# Patient Record
Sex: FEMALE | Race: WHITE | NOT HISPANIC OR LATINO | Employment: OTHER | URBAN - METROPOLITAN AREA
[De-identification: names, ages, dates, MRNs, and addresses within clinical notes are randomized per-mention and may not be internally consistent; named-entity substitution may affect disease eponyms.]

---

## 2017-01-03 ENCOUNTER — OFFICE VISIT (OUTPATIENT)
Dept: INTERNAL MEDICINE | Facility: CLINIC | Age: 72
End: 2017-01-03
Payer: MEDICARE

## 2017-01-03 ENCOUNTER — HOSPITAL ENCOUNTER (OUTPATIENT)
Dept: RADIOLOGY | Facility: HOSPITAL | Age: 72
Discharge: HOME | End: 2017-01-03
Attending: INTERNAL MEDICINE
Payer: MEDICARE

## 2017-01-03 ENCOUNTER — PATIENT MESSAGE (OUTPATIENT)
Dept: INTERNAL MEDICINE | Facility: CLINIC | Age: 72
End: 2017-01-03

## 2017-01-03 VITALS
TEMPERATURE: 99 F | HEIGHT: 64 IN | OXYGEN SATURATION: 94 % | HEART RATE: 77 BPM | WEIGHT: 173.94 LBS | DIASTOLIC BLOOD PRESSURE: 80 MMHG | SYSTOLIC BLOOD PRESSURE: 140 MMHG | BODY MASS INDEX: 29.7 KG/M2

## 2017-01-03 DIAGNOSIS — R05.9 COUGH: ICD-10-CM

## 2017-01-03 DIAGNOSIS — R05.9 COUGH: Primary | ICD-10-CM

## 2017-01-03 PROCEDURE — 99213 OFFICE O/P EST LOW 20 MIN: CPT | Mod: S$PBB,,, | Performed by: INTERNAL MEDICINE

## 2017-01-03 PROCEDURE — 71020 XR CHEST PA AND LATERAL: CPT | Mod: TC

## 2017-01-03 PROCEDURE — 99999 PR PBB SHADOW E&M-EST. PATIENT-LVL III: CPT | Mod: PBBFAC,,, | Performed by: INTERNAL MEDICINE

## 2017-01-03 PROCEDURE — 71020 XR CHEST PA AND LATERAL: CPT | Mod: 26,,, | Performed by: RADIOLOGY

## 2017-01-03 RX ORDER — AZITHROMYCIN 250 MG/1
TABLET, FILM COATED ORAL
Qty: 6 TABLET | Refills: 0 | Status: SHIPPED | OUTPATIENT
Start: 2017-01-03 | End: 2017-02-14 | Stop reason: ALTCHOICE

## 2017-01-03 NOTE — PROGRESS NOTES
Subjective:       Patient ID: Loni Heard is a 71 y.o. female.    Chief Complaint: Nasal Congestion and Cough    HPI the patient is a 71-year-old female is had a cough for 2 months.  She has periodically coughed up green sputum and yellowish sputum.  She has taken a course of doxycycline about 4-5 weeks ago.  This did little to help improve her symptoms long-term.  Her symptoms intensified about a week ago.  And she is now noticing a sore sore throat and low-grade fever.  She is not having any shortness of breath.  A steroid shot was given to her about a week ago which provided some temporary benefit.  Review of Systems   Constitutional: Negative.  Negative for activity change, appetite change and fatigue.   HENT: Positive for congestion, postnasal drip and sore throat.    Respiratory: Positive for cough. Negative for chest tightness and shortness of breath.    Cardiovascular: Negative for chest pain and palpitations.       Objective:      Physical Exam   Constitutional: She appears well-developed and well-nourished. No distress.   HENT:   Right Ear: External ear normal.   Left Ear: External ear normal.   Mouth/Throat: No oropharyngeal exudate.   Reddened posterior pharynx   Neck: No JVD present. No tracheal deviation present. No thyromegaly present.   Cardiovascular: Normal rate, regular rhythm and normal heart sounds.  Exam reveals no gallop and no friction rub.    No murmur heard.  Pulmonary/Chest: Effort normal and breath sounds normal. No respiratory distress. She has no wheezes. She has no rales.   Frequent cough throughout the exam   Lymphadenopathy:     She has no cervical adenopathy.   Skin: She is not diaphoretic.       Assessment:       1. Cough      her cough appears to be multifactorial related to an upper respiratory infection as well as possible ALLERGIES.  I'll get a chest x-ray to be sure no pulmonary cause is present.  Plan:       1.  Z-Yoandy  2.  Flonase 2 sprays each nostril daily given  her occasional exposure to secondhand smoke  3.  Chest x-ray  4.  The patient has appointment with her primary care physician tomorrow

## 2017-01-03 NOTE — MR AVS SNAPSHOT
[facility] - Internal Medicine  [address]  [place] [address]  Phone: [phone]  Fax: [phone]                  Loni Heard   1/3/2017 11:20 AM   Office Visit    Description:  Female : 1945   Provider:  Gavino Rosenberg Jr., MD   Department:  [facility] - Internal Medicine           Reason for Visit     Nasal Congestion     Cough           Diagnoses this Visit        Comments    Cough    -  Primary            To Do List           Future Appointments        Provider Department Dept Phone    1/3/2017 1:45 PM [facility] [id number] LIMIT [facility] [phone]    2017 8:30 AM LAB, APPOINTMENT [facility] IN[facility] [phone]    2017 9:30 AM Mariaelena Mallory MD [facility] Internal Medicine [phone]      Goals (5 Years of Data)     None       These Medications        Disp Refills Start End    azithromycin (ZITHROMAX Z-CARLINE) 250 MG tablet 6 tablet 0 1/3/2017     2 by mouth on day 1, one by mouth daily thereafter    Pharmacy: [facility] [address] [place] [address] Ph #: [phone]         [facility]     [facility] Nurse Care Line -  Assistance  Registered nurses in the [facility] Center provide clinical advisement, health education, appointment booking, and other advisory services.  Call for this free service at [phone].             Medications           Message regarding Medications     Verify the changes and/or additions to your medication regime listed below are the same as discussed with your clinician today.  If any of these changes or additions are incorrect, please notify your healthcare provider.        START taking these NEW medications        Refills    azithromycin (ZITHROMAX Z-CARLINE) 250 MG tablet 0    Si by mouth on day 1, one by mouth daily thereafter    Class: Normal      STOP taking these medications     azelastine-fluticasone  "(DYMISTA) 137-50 mcg/spray Spry 1 spray by Nasal route 2 (two) times daily as needed.    doxycycline (VIBRA-TABS) 100 MG tablet TK 1 T PO BID FOR 10 DAYS           Verify that the below list of medications is an accurate representation of the medications you are currently taking.  If none reported, the list may be blank. If incorrect, please contact your healthcare provider. Carry this list with you in case of emergency.           Current Medications     metformin (GLUCOPHAGE-XR) 500 MG 24 hr tablet Take 1 tablet (500 mg total) by mouth 2 (two) times daily with meals.    PROAIR HFA 90 mcg/actuation inhaler Inhale once every 4 hours prn for 30 days    azithromycin (ZITHROMAX Z-CARLINE) 250 MG tablet 2 by mouth on day 1, one by mouth daily thereafter    benzonatate (TESSALON) 100 MG capsule            Clinical Reference Information           Vital Signs - Last Recorded  Most recent update: 1/3/2017 11:28 AM by Zenaida Goldman MA    BP Pulse Temp Ht Wt SpO2    (!) 140/80 (BP Location: Left arm, Patient Position: Sitting) 77 98.7 °F (37.1 °C) 5' 4" (1.626 m) 78.9 kg (173 lb 15.1 oz) (!) 94%    BMI                29.86 kg/m2          Blood Pressure          Most Recent Value    BP  (!)  140/80      Allergies as of 1/3/2017     Codeine    Penicillins      Immunizations Administered on Date of Encounter - 1/3/2017     None      Orders Placed During Today's Visit     Future Labs/Procedures Expected by Expires    X-Ray Chest PA And Lateral  1/3/2017 4/3/2017      "

## 2017-01-04 ENCOUNTER — OFFICE VISIT (OUTPATIENT)
Dept: INTERNAL MEDICINE | Facility: CLINIC | Age: 72
End: 2017-01-04
Payer: MEDICARE

## 2017-01-04 VITALS
BODY MASS INDEX: 30.26 KG/M2 | DIASTOLIC BLOOD PRESSURE: 74 MMHG | HEIGHT: 64 IN | SYSTOLIC BLOOD PRESSURE: 134 MMHG | HEART RATE: 72 BPM | WEIGHT: 177.25 LBS

## 2017-01-04 DIAGNOSIS — R05.3 CHRONIC COUGH: ICD-10-CM

## 2017-01-04 DIAGNOSIS — E11.9 TYPE 2 DIABETES MELLITUS WITHOUT COMPLICATION, WITHOUT LONG-TERM CURRENT USE OF INSULIN: ICD-10-CM

## 2017-01-04 DIAGNOSIS — J30.2 SEASONAL ALLERGIC RHINITIS, UNSPECIFIED ALLERGIC RHINITIS TRIGGER: Primary | ICD-10-CM

## 2017-01-04 DIAGNOSIS — B99.9 RECURRENT INFECTIONS: ICD-10-CM

## 2017-01-04 PROCEDURE — 99213 OFFICE O/P EST LOW 20 MIN: CPT | Mod: PBBFAC | Performed by: INTERNAL MEDICINE

## 2017-01-04 PROCEDURE — 99214 OFFICE O/P EST MOD 30 MIN: CPT | Mod: S$PBB,,, | Performed by: INTERNAL MEDICINE

## 2017-01-04 PROCEDURE — 99999 PR PBB SHADOW E&M-EST. PATIENT-LVL III: CPT | Mod: PBBFAC,,, | Performed by: INTERNAL MEDICINE

## 2017-01-04 RX ORDER — METFORMIN HYDROCHLORIDE 500 MG/1
500 TABLET ORAL
COMMUNITY
End: 2017-01-04 | Stop reason: ALTCHOICE

## 2017-01-04 RX ORDER — KETOCONAZOLE 20 MG/ML
SHAMPOO, SUSPENSION TOPICAL
COMMUNITY
Start: 2015-05-20 | End: 2017-01-30

## 2017-01-04 NOTE — MR AVS SNAPSHOT
[facility] - Internal Medicine  [address]  [facility] [address]  Phone: [phone]  Fax: [phone]                  Loni Heard   2017 11:30 AM   Office Visit    Description:  Female : 1945   Provider:  Mariaelena Mallory MD   Department:  [facility] - Internal Medicine           Reason for Visit     Follow-up           Diagnoses this Visit        Comments    Seasonal allergic rhinitis, unspecified allergic rhinitis trigger    -  Primary     Recurrent infections         Type 2 diabetes mellitus without complication, without long-term current use of insulin         Chronic cough                To Do List           Future Appointments        Provider Department Dept Phone    2017 8:30 AM LAB, APPOINTMENT [facility] [phone]    2017 9:00 AM Mariaelena Mallory MD [facility] Internal Medicine [phone]      Goals (5 Years of Data)     None       These Medications        Disp Refills Start End    SITagliptan (JANUVIA) 50 MG Tab 30 tablet 4 2017     Take 1 tablet (50 mg total) by mouth once daily. - Oral    Pharmacy: [facility] [id number] [place] [address] Ph #: [phone]         [facility]     [facility] Nurse Care Line -  Assistance  Registered nurses in the [facility] provide clinical advisement, health education, appointment booking, and other advisory services.  Call for this free service at [phone].             Medications           Message regarding Medications     Verify the changes and/or additions to your medication regime listed below are the same as discussed with your clinician today.  If any of these changes or additions are incorrect, please notify your healthcare provider.        START taking these NEW medications        Refills    SITagliptan (JANUVIA) 50 MG Tab 4    Sig: Take 1 tablet (50 mg total) by mouth once daily.    Class:  "Normal    Route: Oral      STOP taking these medications     metformin (GLUCOPHAGE) 500 MG tablet Take 500 mg by mouth.    metformin (GLUCOPHAGE-XR) 500 MG 24 hr tablet Take 1 tablet (500 mg total) by mouth 2 (two) times daily with meals.           Verify that the below list of medications is an accurate representation of the medications you are currently taking.  If none reported, the list may be blank. If incorrect, please contact your healthcare provider. Carry this list with you in case of emergency.           Current Medications     azithromycin (ZITHROMAX Z-CARLINE) 250 MG tablet 2 by mouth on day 1, one by mouth daily thereafter    benzonatate (TESSALON) 100 MG capsule     ketoconazole (NIZORAL) 2 % shampoo Apply to scalp/hair, let sit 5-10 minutes, then rinse 3x/wk    PROAIR HFA 90 mcg/actuation inhaler Inhale once every 4 hours prn for 30 days    SITagliptan (JANUVIA) 50 MG Tab Take 1 tablet (50 mg total) by mouth once daily.           Clinical Reference Information           Vital Signs - Last Recorded  Most recent update: 1/4/2017 12:11 PM by Breana Pickens MA    BP Pulse Ht Wt BMI    134/74 72 5' 4" (1.626 m) 80.4 kg (177 lb 4 oz) 30.42 kg/m2      Blood Pressure          Most Recent Value    BP  134/74      Allergies as of 1/4/2017     Codeine    Penicillins      Immunizations Administered on Date of Encounter - 1/4/2017     None      Orders Placed During Today's Visit      Normal Orders This Visit    Ambulatory consult to Allergy       "

## 2017-01-05 ENCOUNTER — TELEPHONE (OUTPATIENT)
Dept: INTERNAL MEDICINE | Facility: CLINIC | Age: 72
End: 2017-01-05

## 2017-01-05 DIAGNOSIS — E11.9 TYPE 2 DIABETES MELLITUS WITHOUT COMPLICATION, WITHOUT LONG-TERM CURRENT USE OF INSULIN: Primary | ICD-10-CM

## 2017-01-05 RX ORDER — LANCETS
EACH MISCELLANEOUS
Qty: 100 EACH | Refills: 3 | Status: SHIPPED | OUTPATIENT
Start: 2017-01-05 | End: 2019-09-17 | Stop reason: SDUPTHER

## 2017-01-05 RX ORDER — DEXTROSE 4 G
TABLET,CHEWABLE ORAL
Qty: 1 EACH | Refills: 0 | Status: SHIPPED | OUTPATIENT
Start: 2017-01-05

## 2017-01-05 NOTE — TELEPHONE ENCOUNTER
----- Message from Julien Fine MA sent at 1/4/2017  4:35 PM CST -----  Contact: Nay jovan/Gimtmdgsp-[phone]  Type: Rx    Name of medication(s): Meter, Test strips and Lancets    Is this a refill? New rx? New    Who prescribed medication?    [facility], Phone, & [facility] [address] [place] [address]    Comments: Please advise. Thanks!

## 2017-01-05 NOTE — PROGRESS NOTES
"Subjective:       Patient ID: Loni Heard is a 71 y.o. female.    Chief Complaint: Follow-up  71 year old presnet with concerns. She has had problems with cough and recurrant upper respiratory infections. She had prior history of allergies and asthma as a child and not sure what is contributint to her symptoms. She started this fall and saw urgent care doctor was given antibiotic later a steroid shot. She was dr. jacobs yesterday and started z pack with some improvement  She denies current fever or chills. She has inhaler and gets wheezing on occasion. She is concerned about why she is getting recurrant uri and would like to have her allergies evaluated  She also reports no compliance with her metformin. She has diarrhea when she takes it she was taking one but now has not been taking at all and her bs have been up . She would now be aggreable to alternative agents. She has concerns about metals and chemicals she reports but no specific exposures. She has had some grief with the death of her mother  This year but denies significan depression . She has been worn down.    HPI  Review of Systems   Constitutional: Positive for fatigue.   HENT: Positive for sinus pressure.    Respiratory: Positive for cough.    Gastrointestinal: Negative for abdominal pain.   Psychiatric/Behavioral:        Grief from loss of mother        Objective:     Blood pressure 134/74, pulse 72, height 5' 4" (1.626 m), weight 80.4 kg (177 lb 4 oz).    Physical Exam   Constitutional: No distress.   Occasional cough on exam    HENT:   Head: Normocephalic.   Mouth/Throat: Oropharynx is clear and moist.   Nasal congestion    Eyes: No scleral icterus.   Neck: Neck supple.   Cardiovascular: Normal rate, regular rhythm and normal heart sounds.    Pulmonary/Chest: Effort normal and breath sounds normal.   Abdominal: Soft. Bowel sounds are normal.   Neurological: She is alert.   Vitals reviewed.      Assessment:       1. Seasonal allergic " rhinitis, unspecified allergic rhinitis trigger    2. Recurrent infections    3. Type 2 diabetes mellitus without complication, without long-term current use of insulin    4. Chronic cough        Plan:       Loni was seen today for follow-up.    Diagnoses and all orders for this visit:    Seasonal allergic rhinitis, unspecified allergic rhinitis trigger  With hx childhood asthma   Recurrent infections  -     Ambulatory consult to Allergy    Type 2 diabetes mellitus without complication, without long-term current use of insulin  Discussed with pt improved bs control and need for medicaton    Chronic cough  Bronchitis recurrant   She is currently on course of antiboitic  She had chest xray normal reviewed yesterday   -     Ambulatory consult to Allergy    She would like to stop her metformin due to intolrance and try another agent  Will start   -     SITagliptan (JANUVIA) 50 MG Tab; Take 1 tablet (50 mg total) by mouth once daily.    Call with concerns or bs elevations  Keep her current lab appt and review    Follow up 3 month sooner if concern

## 2017-01-09 ENCOUNTER — TELEPHONE (OUTPATIENT)
Dept: INTERNAL MEDICINE | Facility: CLINIC | Age: 72
End: 2017-01-09

## 2017-01-09 NOTE — TELEPHONE ENCOUNTER
Tried to call patient to inform patient that she already has fasting labs scheduled for tomorrow morning. Left message on machine

## 2017-01-09 NOTE — TELEPHONE ENCOUNTER
----- Message from Sakshi Noland sent at 1/9/2017 12:10 PM CST -----  Contact: Self/ [phone]   Type: Orders Request    What orders/ testing are being requested? Blood work     Is there a future appointment scheduled for the patient with PCP? Yes     When? 04/04/2017     Comments: pt is calling to have blood work order. Please call and advise     Thank you

## 2017-01-10 ENCOUNTER — LAB VISIT (OUTPATIENT)
Dept: LAB | Facility: HOSPITAL | Age: 72
End: 2017-01-10
Attending: INTERNAL MEDICINE
Payer: MEDICARE

## 2017-01-10 DIAGNOSIS — E11.9 TYPE 2 DIABETES MELLITUS WITHOUT COMPLICATION, WITHOUT LONG-TERM CURRENT USE OF INSULIN: ICD-10-CM

## 2017-01-10 LAB
ALBUMIN SERPL BCP-MCNC: 3.8 G/DL
ALP SERPL-CCNC: 73 U/L
ALT SERPL W/O P-5'-P-CCNC: 20 U/L
ANION GAP SERPL CALC-SCNC: 6 MMOL/L
AST SERPL-CCNC: 18 U/L
BASOPHILS # BLD AUTO: 0.02 K/UL
BASOPHILS NFR BLD: 0.3 %
BILIRUB DIRECT SERPL-MCNC: 0.1 MG/DL
BILIRUB SERPL-MCNC: 0.4 MG/DL
BUN SERPL-MCNC: 15 MG/DL
CALCIUM SERPL-MCNC: 9.6 MG/DL
CHLORIDE SERPL-SCNC: 104 MMOL/L
CHOLEST/HDLC SERPL: 5.4 {RATIO}
CO2 SERPL-SCNC: 29 MMOL/L
CREAT SERPL-MCNC: 0.9 MG/DL
DIFFERENTIAL METHOD: NORMAL
EOSINOPHIL # BLD AUTO: 0.1 K/UL
EOSINOPHIL NFR BLD: 1.2 %
ERYTHROCYTE [DISTWIDTH] IN BLOOD BY AUTOMATED COUNT: 12.6 %
EST. GFR  (AFRICAN AMERICAN): >60 ML/MIN/1.73 M^2
EST. GFR  (NON AFRICAN AMERICAN): >60 ML/MIN/1.73 M^2
GLUCOSE SERPL-MCNC: 159 MG/DL
HCT VFR BLD AUTO: 41.1 %
HDL/CHOLESTEROL RATIO: 18.5 %
HDLC SERPL-MCNC: 265 MG/DL
HDLC SERPL-MCNC: 49 MG/DL
HGB BLD-MCNC: 13.6 G/DL
LDLC SERPL CALC-MCNC: 181.6 MG/DL
LYMPHOCYTES # BLD AUTO: 3.2 K/UL
LYMPHOCYTES NFR BLD: 41.4 %
MCH RBC QN AUTO: 29.2 PG
MCHC RBC AUTO-ENTMCNC: 33.1 %
MCV RBC AUTO: 88 FL
MONOCYTES # BLD AUTO: 0.5 K/UL
MONOCYTES NFR BLD: 6.8 %
NEUTROPHILS # BLD AUTO: 3.9 K/UL
NEUTROPHILS NFR BLD: 49.9 %
NONHDLC SERPL-MCNC: 216 MG/DL
PLATELET # BLD AUTO: 264 K/UL
PMV BLD AUTO: 10.6 FL
POTASSIUM SERPL-SCNC: 4.7 MMOL/L
PROT SERPL-MCNC: 6.8 G/DL
RBC # BLD AUTO: 4.65 M/UL
SODIUM SERPL-SCNC: 139 MMOL/L
TRIGL SERPL-MCNC: 172 MG/DL
TSH SERPL DL<=0.005 MIU/L-ACNC: 2.29 UIU/ML
WBC # BLD AUTO: 7.8 K/UL

## 2017-01-10 PROCEDURE — 84443 ASSAY THYROID STIM HORMONE: CPT

## 2017-01-10 PROCEDURE — 80048 BASIC METABOLIC PNL TOTAL CA: CPT

## 2017-01-10 PROCEDURE — 36415 COLL VENOUS BLD VENIPUNCTURE: CPT

## 2017-01-10 PROCEDURE — 83036 HEMOGLOBIN GLYCOSYLATED A1C: CPT

## 2017-01-10 PROCEDURE — 80061 LIPID PANEL: CPT

## 2017-01-10 PROCEDURE — 85025 COMPLETE CBC W/AUTO DIFF WBC: CPT

## 2017-01-10 PROCEDURE — 80076 HEPATIC FUNCTION PANEL: CPT

## 2017-01-11 LAB
ESTIMATED AVG GLUCOSE: 169 MG/DL
HBA1C MFR BLD HPLC: 7.5 %

## 2017-01-20 ENCOUNTER — TELEPHONE (OUTPATIENT)
Dept: INTERNAL MEDICINE | Facility: CLINIC | Age: 72
End: 2017-01-20

## 2017-01-27 ENCOUNTER — TELEPHONE (OUTPATIENT)
Dept: INTERNAL MEDICINE | Facility: CLINIC | Age: 72
End: 2017-01-27

## 2017-01-27 DIAGNOSIS — E11.9 TYPE 2 DIABETES MELLITUS WITHOUT COMPLICATION, WITHOUT LONG-TERM CURRENT USE OF INSULIN: Primary | ICD-10-CM

## 2017-01-27 NOTE — TELEPHONE ENCOUNTER
----- Message from Allen Arrington MA sent at 1/25/2017 11:32 AM CST -----  Contact: self - [phone]  Type: Returning a call    Who left a message? Eder     When did the practice call? 1/20/17    Comments: Please call. Thanks!

## 2017-01-30 ENCOUNTER — OFFICE VISIT (OUTPATIENT)
Dept: ALLERGY | Facility: CLINIC | Age: 72
End: 2017-01-30
Payer: MEDICARE

## 2017-01-30 VITALS
HEIGHT: 63 IN | WEIGHT: 179.88 LBS | DIASTOLIC BLOOD PRESSURE: 70 MMHG | HEART RATE: 72 BPM | SYSTOLIC BLOOD PRESSURE: 144 MMHG | BODY MASS INDEX: 31.87 KG/M2

## 2017-01-30 DIAGNOSIS — R05.9 COUGH: Primary | ICD-10-CM

## 2017-01-30 PROCEDURE — 99999 PR PBB SHADOW E&M-EST. PATIENT-LVL III: CPT | Mod: PBBFAC,,, | Performed by: ALLERGY & IMMUNOLOGY

## 2017-01-30 PROCEDURE — 99213 OFFICE O/P EST LOW 20 MIN: CPT | Mod: PBBFAC | Performed by: ALLERGY & IMMUNOLOGY

## 2017-01-30 PROCEDURE — 99204 OFFICE O/P NEW MOD 45 MIN: CPT | Mod: S$PBB,,, | Performed by: ALLERGY & IMMUNOLOGY

## 2017-01-30 RX ORDER — CHOLECALCIFEROL (VITAMIN D3) 50 MCG
TABLET ORAL DAILY
COMMUNITY
End: 2018-12-10 | Stop reason: SDUPTHER

## 2017-01-30 RX ORDER — ASCORBIC ACID 250 MG
TABLET,CHEWABLE ORAL
COMMUNITY
End: 2021-01-19

## 2017-01-30 NOTE — LETTER
January 30, 2017      Mariaelena Mallory MD  [address]  [facility] [address]           [facility]  [address]  [facility] [address]  Phone: [phone]  Fax: [phone]          Patient: Loni Heard   MR Number: [id number]   YOB: 1945   Date of Visit: 1/30/2017       Dear Dr. Mariaelena Mallory:    Thank you for referring Loni Heard to me for evaluation. Attached you will find relevant portions of my assessment and plan of care.    If you have questions, please do not hesitate to call me. I look forward to following Loni Heard along with you.    Sincerely,    YNES Haji III, MD    Enclosure  CC:  No Recipients    If you would like to receive this communication electronically, please contact [email or web address] or [phone] to request more information on [facility] access.    For providers and/or their staff who would like to refer a patient to [facility], please contact us through our one-stop-shop provider referral line, [facility], at [phone].    If you feel you have received this communication in error or would no longer like to receive these types of communications, please e-mail [email or web address]

## 2017-01-30 NOTE — PROGRESS NOTES
"Loni "Mario" Félix is referred by Dr. Mariaelena Mallory for a consult regarding chronic rhinitis and a cough. She is here alone.    She has had recurrent rhinitis and a cough for over 10 years. She saw Dr. Hemphill and Dr. Hager on Haley 3, 2008 and skin testing was done. These were negative.    She also saw Dr. Carvalho for recurrent throat swelling. He did not see any evidence of LPR at that time.    She has continued to have symptoms with sneezing, clear rhinorrhea, postnasal drip, frequent throat clearing, hoarseness, a sensation that something is in the back of the throat, and a cough that may be productive.    She has been treated for recurrent bronchitis with antibiotics. She says that she had this for about 2 months beginning in November. She has had albuterol in the past but does not feel that this.    She does occasionally get acid in her mouth. She occasionally has heartburn.    For ROS, FH, SH please see Allergy and Asthma Questionnaire dated today.    Some relevant pertinent positives:    Review of Systems:  She has diabetes and takes Januvia. She has hyperlipidemia    Family History: Her mother had reflux. Her father had hayfever.    Home environment: She has lived in the same house for the past 16 years. There was no water damage. There is no evidence of mold. There is no carpeting in the bedroom. There is one cat that lives inside the house. She does not have any problems around the cat.    Social History: She is a retired . She used to work for the 's office. She is a nonsmoker.    Physical Examination:  General: Well-developed, well-nourished, no acute distress. Clearing throat throughout interview.  Head: No sinus tenderness.  Eyes: Conjunctivae:  No bulbar or palpebral conjunctival injection.  Ears: EAC's clear.  TM's clear.  No pre-auricular nodes.  Nose: Nasal Mucosa:  Pink.  Septum: No apparent deviation.  Turbinates:  No significant edema.  Polyps/Mass:  None " visible.  Teeth/Gums:  No bleeding noted.  Oropharynx: No exudates.  Neck: Supple without thyromegaly. No cervical lymphadenopathy.    Respiratory/Chest: Effort: Good.  Auscultation:  Clear bilaterally.  Cardiovascular:  No murmur, rubs, or gallop heard.   GI:  Non-tender.  No masses.  No organomegaly.  Extremities:  No swelling.  No cyanosis, clubbing, or edema.  Skin: Good turgor.  No urticaria or angioedema.  Neuro/Psych: Oriented x 3.    Assessment:  1. Chronic rhinitis, consider allergic.  2. Chronic cough, consider allergic.  3. Probable LPR.  4. Mild GERD.  5. Diabetes on Januvia.    Recommendations:  1. Laboratory as ordered.  2. Spirometry.  3. LPR reviewed.  4. ENT evaluation for LPR.  5. Return to clinic in 2 weeks.

## 2017-02-01 DIAGNOSIS — K21.9 LARYNGOPHARYNGEAL REFLUX: Primary | ICD-10-CM

## 2017-02-02 ENCOUNTER — TELEPHONE (OUTPATIENT)
Dept: INTERNAL MEDICINE | Facility: CLINIC | Age: 72
End: 2017-02-02

## 2017-02-02 NOTE — TELEPHONE ENCOUNTER
----- Message from Marie Castrejon sent at 1/31/2017  2:55 PM CST -----  Contact: Donna [facility] [employer] Donna needs to know if the patient takes insulin injections or is she just on oral medications for her diabetes.  Please call Jacque at [phone], can leave a voicemail.    Thanks!

## 2017-02-14 ENCOUNTER — OFFICE VISIT (OUTPATIENT)
Dept: OTOLARYNGOLOGY | Facility: CLINIC | Age: 72
End: 2017-02-14
Payer: MEDICARE

## 2017-02-14 VITALS
BODY MASS INDEX: 30.38 KG/M2 | WEIGHT: 177.94 LBS | DIASTOLIC BLOOD PRESSURE: 79 MMHG | HEIGHT: 64 IN | SYSTOLIC BLOOD PRESSURE: 148 MMHG | HEART RATE: 60 BPM

## 2017-02-14 DIAGNOSIS — R05.3 CHRONIC COUGH: ICD-10-CM

## 2017-02-14 DIAGNOSIS — K21.9 LPRD (LARYNGOPHARYNGEAL REFLUX DISEASE): Primary | ICD-10-CM

## 2017-02-14 DIAGNOSIS — R09.89 CHRONIC THROAT CLEARING: ICD-10-CM

## 2017-02-14 DIAGNOSIS — J38.5 SLEEP RELATED LARYNGOSPASM: ICD-10-CM

## 2017-02-14 PROCEDURE — 99214 OFFICE O/P EST MOD 30 MIN: CPT | Mod: PBBFAC,PO | Performed by: NURSE PRACTITIONER

## 2017-02-14 PROCEDURE — 31575 DIAGNOSTIC LARYNGOSCOPY: CPT | Mod: PBBFAC,PO | Performed by: NURSE PRACTITIONER

## 2017-02-14 PROCEDURE — 99999 PR PBB SHADOW E&M-EST. PATIENT-LVL IV: CPT | Mod: PBBFAC,,, | Performed by: NURSE PRACTITIONER

## 2017-02-14 PROCEDURE — 31575 DIAGNOSTIC LARYNGOSCOPY: CPT | Mod: S$PBB,,, | Performed by: NURSE PRACTITIONER

## 2017-02-14 PROCEDURE — 99203 OFFICE O/P NEW LOW 30 MIN: CPT | Mod: 25,S$PBB,, | Performed by: NURSE PRACTITIONER

## 2017-02-14 RX ORDER — OMEPRAZOLE 40 MG/1
40 CAPSULE, DELAYED RELEASE ORAL
Qty: 30 CAPSULE | Refills: 11 | Status: SHIPPED | OUTPATIENT
Start: 2017-02-14 | End: 2017-12-08

## 2017-02-14 NOTE — PATIENT INSTRUCTIONS
"[facility] l[facility]ologist is Dr. Rafa Cuello at [phone].        How Acid Reflux Affects Your Throat    Do you have to clear your throat or cough often? Are you hoarse? Do you have trouble swallowing? If you have these or other throat symptoms, you may have acid reflux. This occurs when stomach acid flows back up and irritates your throat.  Why you have throat symptoms  There are muscles (esophageal sphincters) at both ends of the tube that carries food to your stomach (the esophagus). These muscles relax to let food pass. Then they tighten to keep stomach acid down. When the lower esophageal sphincter (LES) doesnt tighten enough, acid can flow back (reflux) from your stomach into your esophagus. This may cause heartburn. In some cases the upper esophageal sphincter (UES) also doesnt work well. Then acid can travel higher and enter your throat (pharynx). In many cases, this causes throat symptoms.  Common throat symptoms  · Need to clear your throat often  · Feeling like youre choking  · Long-term (chronic) cough  · Hoarseness  · Trouble swallowing  · Feel like you have a lump in your throat  · Sour or acid taste  · Sore throat that keeps coming back     LARYNGOPHARYNGEAL REFLUX  (SILENT OR ATYPICAL REFLUX)    If you have any of the following symptoms you may have laryngopharyngeal reflux (LPR):  hoarseness, thick or too much mucus, chronic throat pain/irritation, chronic throat clearing, chronic cough, especially cough that wake you up from sleep, chronic "postnasal drip" without the need to blow your nose.     Many people with LPR do not have symptoms of heartburn. Compared to the esophagus, the voice box and the back of the throat are significantly more sensitive to the effects of acid on surrounding tissue. Acid passing quickly through the esophagus does not have a chance to irritate the area for too long.  However acid that pools in the throat or voice box can cause prolonged irritation resulting in the " "symptoms of LPR.    The symptoms of LPR can consist of a dry cough and the sensation of something being stuck in the throat.  Some people will complain of heartburn while others may have intermittent hoarseness or loss of voice.  Another major symptom of LPR is "postnasal drip."  Patients are often told symptoms are due to abnormal nasal drainage or sinus infection; however this is rarely the cause of chronic throat irritation. For post nasal drip to cause the complaints described, signs and symptoms of an active nasal infection should be present.     Treatments for LPR include:  postural changes, weight reduction, diet modification, medication to reduce stomach acid and promote normal motility, and surgery to prevent reflux. Most patients will begin to notice some relief in her symptoms about 2 weeks after starting the medication; however it is generally recommended the medication should be continued for 2 months. If the symptoms completely resolve, the medication can then be tapered.  Some people will remain symptom free while others may have relapses which required treatment again.    Things you can do to prevent reflux include:  Do not smoke.  Smoking will cause reflux.  Avoid tight fitting clothes or belts around the waist.  Avoid eating at least 2 hours prior to bedtime.  In fact avoid eating your largest meal at night.  Weight loss.  For patient's with recent weight gain, shedding a few pounds is all that is required to improve reflux.  Avoid caffeine, cola beverages, citrus beverages, mints, alcoholic beverages, particularly at night, cheese, fried foods, spicy foods, eggs, and chocolate.  Sleep with the head of bed elevated at least 6 inches.    Take Omeprazole / Prilosec 40 mg (PPI) every morning on an empty stomach (30-60 minutes before eating). Then at bedtime take Zantac (H2-blocker) 300 mg.  All are available over-the-counter without a prescription. And see a Gastro doctor (GI) for further evaluation " and continued management.

## 2017-02-14 NOTE — MR AVS SNAPSHOT
[facility]  [address] [facility] [address]  [facility] [address]  Phone: [phone]  Fax: [phone]                  Loni Heard   2017 2:00 PM   Office Visit    Description:  Female : 1945   Provider:  Daya Ordaz NP   Department:  [facility]           Reason for Visit     evaluate for LPRD           Diagnoses this Visit        Comments    LPRD (laryngopharyngeal reflux disease)    -  Primary     Sleep related laryngospasm                To Do List           Future Appointments        Provider Department Dept Phone    2/15/2017 9:00 AM PULMONARY FUNCTION [facility] - Pulmonary Lab [phone]    2/15/2017 10:00 AM MD Perry Viera III [facility] - Allergy/ Immunology [phone]    3/27/2017 8:20 AM LAB, APPOINTMENT [facility] [phone]    2017 9:00 AM Mariaelena Mallory MD [facility] - Internal Medicine [phone]      Goals (5 Years of Data)     None       These Medications        Disp Refills Start End    omeprazole (PRILOSEC) 40 MG capsule 30 capsule 11 2017    Take 1 capsule (40 mg total) by mouth before breakfast. Take on empty stomach 30-60 minutes before meal - Oral    [facility] [address] [place] [address] AT [facility] Ph #: [phone]       ranitidine (ZANTAC) 300 MG tablet 30 tablet 11 2017    Take 1 tablet (300 mg total) by mouth every evening. - Oral    Pharmacy: [facility] [address] [place] [address] [address]  AT [facility] Ph #: [phone]         [facility] On Call     [facility] On Call Nurse Care Line -  Assistance  Registered nurses in the [facility] On Call Center provide clinical advisement, health education, appointment booking, and other advisory services.  Call for this free service at [phone].             Medications           Message regarding Medications     Verify the changes and/or additions to your medication regime  listed below are the same as discussed with your clinician today.  If any of these changes or additions are incorrect, please notify your healthcare provider.        START taking these NEW medications        Refills    omeprazole (PRILOSEC) 40 MG capsule 11    Sig: Take 1 capsule (40 mg total) by mouth before breakfast. Take on empty stomach 30-60 minutes before meal    Class: Normal    Route: Oral    ranitidine (ZANTAC) 300 MG tablet 11    Sig: Take 1 tablet (300 mg total) by mouth every evening.    Class: Normal    Route: Oral      STOP taking these medications     azithromycin (ZITHROMAX Z-CARLINE) 250 MG tablet 2 by mouth on day 1, one by mouth daily thereafter           Verify that the below list of medications is an accurate representation of the medications you are currently taking.  If none reported, the list may be blank. If incorrect, please contact your healthcare provider. Carry this list with you in case of emergency.           Current Medications     ascorbic acid, vitamin C, (VITAMIN C) 250 mg Chew Take by mouth.    blood sugar diagnostic Strp Use for blood sugar testing once daily - product selection permitted    blood-glucose meter Misc Use for blood sugar testing as instructed once daily - product selection permitted    cholecalciferol, vitamin D3, (VITAMIN D3) 2,000 unit Tab Take by mouth once daily.    lancets Misc Use for blood sugar testing once daily - product selection permitted    SITagliptan (JANUVIA) 50 MG Tab Take 1 tablet (50 mg total) by mouth once daily.    VITAMIN A ORAL Take by mouth.    omeprazole (PRILOSEC) 40 MG capsule Take 1 capsule (40 mg total) by mouth before breakfast. Take on empty stomach 30-60 minutes before meal    PROAIR HFA 90 mcg/actuation inhaler Inhale once every 4 hours prn for 30 days    ranitidine (ZANTAC) 300 MG tablet Take 1 tablet (300 mg total) by mouth every evening.           Clinical Reference Information           Your Vitals Were     BP Pulse Height Weight  "BMI    148/79 60 5' 4" (1.626 m) 80.7 kg (177 lb 14.6 oz) 30.54 kg/m2      Blood Pressure          Most Recent Value    BP  (!)  148/79      Allergies as of 2/14/2017     Codeine    Penicillins      Immunizations Administered on Date of Encounter - 2/14/2017     None      Instructions    [facility] l[facility]ologist is Dr. Rafa Cuello at [phone].        How Acid Reflux Affects Your Throat    Do you have to clear your throat or cough often? Are you hoarse? Do you have trouble swallowing? If you have these or other throat symptoms, you may have acid reflux. This occurs when stomach acid flows back up and irritates your throat.  Why you have throat symptoms  There are muscles (esophageal sphincters) at both ends of the tube that carries food to your stomach (the esophagus). These muscles relax to let food pass. Then they tighten to keep stomach acid down. When the lower esophageal sphincter (LES) doesnt tighten enough, acid can flow back (reflux) from your stomach into your esophagus. This may cause heartburn. In some cases the upper esophageal sphincter (UES) also doesnt work well. Then acid can travel higher and enter your throat (pharynx). In many cases, this causes throat symptoms.  Common throat symptoms  · Need to clear your throat often  · Feeling like youre choking  · Long-term (chronic) cough  · Hoarseness  · Trouble swallowing  · Feel like you have a lump in your throat  · Sour or acid taste  · Sore throat that keeps coming back     LARYNGOPHARYNGEAL REFLUX  (SILENT OR ATYPICAL REFLUX)    If you have any of the following symptoms you may have laryngopharyngeal reflux (LPR):  hoarseness, thick or too much mucus, chronic throat pain/irritation, chronic throat clearing, chronic cough, especially cough that wake you up from sleep, chronic "postnasal drip" without the need to blow your nose.     Many people with LPR do not have symptoms of heartburn. Compared to the esophagus, the voice box and the back of " "the throat are significantly more sensitive to the effects of acid on surrounding tissue. Acid passing quickly through the esophagus does not have a chance to irritate the area for too long.  However acid that pools in the throat or voice box can cause prolonged irritation resulting in the symptoms of LPR.    The symptoms of LPR can consist of a dry cough and the sensation of something being stuck in the throat.  Some people will complain of heartburn while others may have intermittent hoarseness or loss of voice.  Another major symptom of LPR is "postnasal drip."  Patients are often told symptoms are due to abnormal nasal drainage or sinus infection; however this is rarely the cause of chronic throat irritation. For post nasal drip to cause the complaints described, signs and symptoms of an active nasal infection should be present.     Treatments for LPR include:  postural changes, weight reduction, diet modification, medication to reduce stomach acid and promote normal motility, and surgery to prevent reflux. Most patients will begin to notice some relief in her symptoms about 2 weeks after starting the medication; however it is generally recommended the medication should be continued for 2 months. If the symptoms completely resolve, the medication can then be tapered.  Some people will remain symptom free while others may have relapses which required treatment again.    Things you can do to prevent reflux include:  Do not smoke.  Smoking will cause reflux.  Avoid tight fitting clothes or belts around the waist.  Avoid eating at least 2 hours prior to bedtime.  In fact avoid eating your largest meal at night.  Weight loss.  For patient's with recent weight gain, shedding a few pounds is all that is required to improve reflux.  Avoid caffeine, cola beverages, citrus beverages, mints, alcoholic beverages, particularly at night, cheese, fried foods, spicy foods, eggs, and chocolate.  Sleep with the head of bed " elevated at least 6 inches.    Take Omeprazole / Prilosec 40 mg (PPI) every morning on an empty stomach (30-60 minutes before eating). Then at bedtime take Zantac (H2-blocker) 300 mg.  All are available over-the-counter without a prescription. And see a Gastro doctor (GI) for further evaluation and continued management.               Language Assistance Services     ATTENTION: Language assistance services are available, free of charge. Please call [phone].      ATENCIÓN: Si habla marie, tiene a govea disposición servicios gratuitos de asistencia lingüística. Llame al [phone].     MADDI Ý: N?u b?n nói Ti?ng Vi?t, có các d?ch v? h? tr? ngôn ng? mi?n phí dành cho b?n. G?i s? [phone].         [facility] complies with applicable Federal civil rights laws and does not discriminate on the basis of race, color, national origin, age, disability, or sex.

## 2017-02-14 NOTE — PROGRESS NOTES
"Subjective:       Patient ID: Loni Heard is a 71 y.o. female.    Chief Complaint: evaluate for LPRD    HPI   Patient was referred by Dr. Haji for evaluation of suspected LPRD. Her allergy testing was negative. She was on a cruise in 2003 or 2004 when she experienced an acute episode of "throat closing" during which she was unable to get air in and out. This was quite frightful to her, so upon her return she saw her PCP who referred her to an ENT who reportedly found nothing wrong. Since then, it has recurred hundreds of times, always at night, waking her up in the middle of the night unable to breathe. If she can get her hands on a glass of water soon enough and take a sip, she can stop the attack. She has had pneumonia a couple of times, and bronchitis several times with episodes lasting up to 2.5 months. Laryngospasm episodes usually occur during times of illness, like a "cold." She states she is at her wits end as no one has been able to remedy this for her.     Review of Systems   Constitutional: Negative.    HENT: Positive for rhinorrhea (sometimes).         Sensation of thick or too much mucus in the back of the throat  Chronic throat clearing  Not hoarse now but unable to sing like before   Eyes: Negative.    Respiratory: Positive for cough.    Cardiovascular: Negative.    Gastrointestinal: Negative.    Musculoskeletal: Negative.    Skin: Negative.    Neurological: Negative.    Hematological: Negative.    Psychiatric/Behavioral: Negative.        Objective:      Physical Exam   Constitutional: She is oriented to person, place, and time. Vital signs are normal. She appears well-developed and well-nourished. She is cooperative. She does not appear ill. No distress.   HENT:   Head: Normocephalic and atraumatic.   Right Ear: Hearing, tympanic membrane, external ear and ear canal normal. Tympanic membrane is not erythematous. No middle ear effusion.   Left Ear: Hearing, tympanic membrane, external ear " and ear canal normal. Tympanic membrane is not erythematous.  No middle ear effusion.   Nose: Nose normal. No mucosal edema or rhinorrhea. Right sinus exhibits no maxillary sinus tenderness and no frontal sinus tenderness. Left sinus exhibits no maxillary sinus tenderness and no frontal sinus tenderness.   Mouth/Throat: Uvula is midline, oropharynx is clear and moist and mucous membranes are normal. Mucous membranes are not pale, not dry and not cyanotic. No oral lesions. No oropharyngeal exudate, posterior oropharyngeal edema or posterior oropharyngeal erythema.   Eyes: Conjunctivae, EOM and lids are normal. Pupils are equal, round, and reactive to light. Right eye exhibits no discharge. Left eye exhibits no discharge. No scleral icterus.   Neck: Trachea normal and normal range of motion. Neck supple. No tracheal deviation present. No thyroid mass and no thyromegaly present.   Cardiovascular: Normal rate.    Pulmonary/Chest: Effort normal. No stridor. No respiratory distress. She has no wheezes.   Musculoskeletal: Normal range of motion.   Lymphadenopathy:        Head (right side): No submental, no submandibular, no tonsillar, no preauricular and no posterior auricular adenopathy present.        Head (left side): No submental, no submandibular, no tonsillar, no preauricular and no posterior auricular adenopathy present.     She has no cervical adenopathy.        Right cervical: No superficial cervical and no posterior cervical adenopathy present.       Left cervical: No superficial cervical and no posterior cervical adenopathy present.   Neurological: She is alert and oriented to person, place, and time. She has normal strength. Coordination and gait normal.   Skin: Skin is warm, dry and intact. No lesion and no rash noted. She is not diaphoretic. No cyanosis. No pallor.   Psychiatric: She has a normal mood and affect. Her speech is normal and behavior is normal. Judgment and thought content normal. Cognition and  memory are normal.   Nursing note and vitals reviewed.      Procedure: Flexible laryngoscopy    In order to fully examine the upper aerodigestive tract, including the larynx, in a patient with a hyperactive gag reflex, and suboptimal visualization with indirect mirror exam,  flexible endoscopy is required.   After explaining the procedure and obtaining verbal consent, a timeout was performed with the patient's participation according to the universal protocol. Both nasal cavities were anesthetized with 4% Xylocaine spray mixed with Geraldo-Synephrine. The flexible laryngoscope  was inserted into the nasal cavity and advanced to visualize the nasal cavity, nasopharynx, the posterior oropharynx, hypopharynx, and the endolarynx with the  findings noted. The scope was removed and the procedure terminated. The patient tolerated this procedure well without apparent complication.     OVERALL FINDINGS  Nasopharynx - the torus is clear. There are no lesions of the posterior wall.   Oropharynx - no lesions of the tongue base. There is no obvious fullness or asymmetry.  Hypopharynx - there are no lesions of the pyriform sinuses or postcricoid region   Larynx - there are no lesions of the supraglottic or glottic larynx.  Vocal fold mobility is normal.     SPECIFIC FINDINGS  Adenoid tissue - normal   Nasopharynx & eustachian tube orifices - normal   Posterior pharyngeal wall - normal   Base of tongue - normal   Epiglottis - normal   Valleculae - normal   Pyriform sinuses - normal   False vocal cords - normal   True vocal cords - normal  Arytenoids - normal   Interarytenoid space - thickened, erythematous   Right Base of Tongue    Left Base of Tongue    Larynx    Larynx (phonation) with globus formation    Assessment:     LPRD manifested as nocturnal laryngospasms, cough, globus sensation and throat clearing      Plan:     Laryngoscope photos given and reviewed in detail with patient. Recommend omeprazole QAM, ranitidine QHS, and  encouraged patient to establish care with GI for continued management. Handouts given on LPRD and GERD, and discussed at length with patient.   If symptoms continue to persist, advised to see Dr. JEANNETTE Cuello at [facility].

## 2017-02-14 NOTE — LETTER
February 14, 2017      YNSE Haji III, MD  [address]  [facility] [address]           [place] - [facility]  [address] [facility] [address] [place] [address]  Phone: [phone]  Fax: [phone]          Patient: Loni Heard   MR Number: [id number]   YOB: 1945   Date of Visit: 2/14/2017       Dear Dr. YNES Haji III:    Thank you for referring Loni Heard to me for evaluation. Attached you will find relevant portions of my assessment and plan of care.    If you have questions, please do not hesitate to call me. I look forward to following Loni Heard along with you.    Sincerely,    Daya Ordaz NP    Enclosure  CC:  No Recipients    If you would like to receive this communication electronically, please contact [email or web address] or [phone] to request more information on [employer] Link access.    For providers and/or their staff who would like to refer a patient to [facility], please contact us through our one-stop-shop provider referral line, [facility], at [phone].    If you feel you have received this communication in error or would no longer like to receive these types of communications, please e-mail [email or web address]

## 2017-02-15 ENCOUNTER — OFFICE VISIT (OUTPATIENT)
Dept: ALLERGY | Facility: CLINIC | Age: 72
End: 2017-02-15
Payer: MEDICARE

## 2017-02-15 ENCOUNTER — HOSPITAL ENCOUNTER (OUTPATIENT)
Dept: PULMONOLOGY | Facility: CLINIC | Age: 72
Discharge: HOME | End: 2017-02-15
Payer: MEDICARE

## 2017-02-15 VITALS
HEIGHT: 64 IN | HEART RATE: 68 BPM | OXYGEN SATURATION: 97 % | SYSTOLIC BLOOD PRESSURE: 130 MMHG | DIASTOLIC BLOOD PRESSURE: 68 MMHG | WEIGHT: 178.63 LBS | TEMPERATURE: 98 F | BODY MASS INDEX: 30.5 KG/M2

## 2017-02-15 DIAGNOSIS — K21.9 LARYNGOPHARYNGEAL REFLUX: Primary | ICD-10-CM

## 2017-02-15 DIAGNOSIS — R05.9 COUGH: ICD-10-CM

## 2017-02-15 DIAGNOSIS — J31.0 CHRONIC RHINITIS: ICD-10-CM

## 2017-02-15 LAB
PRE FEV1 FVC: 75
PRE FEV1: 2.1
PRE FVC: 2.81
PREDICTED FEV1 FVC: 78
PREDICTED FEV1: 2.18
PREDICTED FVC: 2.82

## 2017-02-15 PROCEDURE — 94010 BREATHING CAPACITY TEST: CPT | Mod: 26,S$PBB,, | Performed by: INTERNAL MEDICINE

## 2017-02-15 PROCEDURE — 99999 PR PBB SHADOW E&M-EST. PATIENT-LVL III: CPT | Mod: PBBFAC,,, | Performed by: ALLERGY & IMMUNOLOGY

## 2017-02-15 PROCEDURE — 99213 OFFICE O/P EST LOW 20 MIN: CPT | Mod: PBBFAC | Performed by: ALLERGY & IMMUNOLOGY

## 2017-02-15 PROCEDURE — 99214 OFFICE O/P EST MOD 30 MIN: CPT | Mod: S$PBB,,, | Performed by: ALLERGY & IMMUNOLOGY

## 2017-02-15 NOTE — PROGRESS NOTES
"Loni Lemajamee Heard returns to clinic today for continued evaluation of chronic rhinitis and a cough. She is here alone. She was last seen 2017.    After her last visit, she did see NP Daya Ordaz who saw evidence of LPR. She started her on omeprazole 40 mg in the morning and ranitidine 300 mg at night.    She has not yet seen any improvement. She continues to have mild rhinitis with postnasal drip, frequent throat clearing, hoarseness, a sensation that something is in the back throat, and a nonproductive cough.    She has had skin tests in the past with Dr. jordan that were negative.    She does occasionally get acid in her mouth.    [facility] PEQ ALLERGY QUESTIONNAIRE SHORT 2/15/2017   Are you taking any new medications since your last visit? Yes   Constitution: No symptoms   Head or facial pain: No symptoms   Eyes: No symptoms   Ears: No symptoms   Nose: Post nasal drip   Throat: Frequent clearing   Sinuses: No symptoms   Lungs: No symptoms   Skin: No symptoms   Cardiovascular: No symptoms   Gastrointestinal: No symptoms   Genital/ urinary No symptoms   Musculoskeletal: No symptoms   Neurologic: No symptoms   Endocrine: Diabetes   Hematologic: No symptoms     Physical Examination:  General: Well-developed, well-nourished, no acute distress. Clearing throat throughout interview.  Skin: Good turgor.  No urticaria or angioedema.  Neuro/Psych: Oriented x 3.    Laboratory 2017:  IgE level: 35.  ImmunoCAP: Negative.  IgA: 137.  Ig.  IgM: 105.  Pneumococcal titers: Protective.    Spirometry 2/15/2017: Normal.    Assessment:  1. Chronic rhinitis, doubt allergic.  2. Chronic cough, doubt allergic.  3. LPR.  4. Mild GERD.  5. Diabetes on Januvia.    Recommendations:  1. Start omeprazole.  2. Start ranitidine.  3. Observe symptoms on above.  4. Consider skin testing in the future off antihistamines if needed.  5. Return to clinic in 2-3 months.  "

## 2017-02-15 NOTE — MR AVS SNAPSHOT
[facility] - Allergy/ Immunology  [address]  [facility] [address]  Phone: [phone]  Fax: [phone]                  Loni Heard   2/15/2017 10:00 AM   Office Visit    Description:  Female : 1945   Provider:  YNES Haji III, MD   Department:  [facility] - Allergy/ Immunology           Reason for Visit     Follow-up                To Do List           Future Appointments        Provider Department Dept Phone    3/27/2017 8:20 AM LAB, APPOINTMENT [facility] [phone]    2017 8:20 AM MD Perry Viera III [facility] - Allergy/ Immunology [phone]    2017 9:00 AM Mariaelena Mallory MD [facility] - Internal Medicine [phone]      Goals (5 Years of Data)     None      [facility] On Call     [facility] On [facility] Nurse Care Line -  Assistance  Registered nurses in the [facility] On Call Center provide clinical advisement, health education, appointment booking, and other advisory services.  Call for this free service at [phone].             Medications           Message regarding Medications     Verify the changes and/or additions to your medication regime listed below are the same as discussed with your clinician today.  If any of these changes or additions are incorrect, please notify your healthcare provider.        STOP taking these medications     PROAIR HFA 90 mcg/actuation inhaler Inhale once every 4 hours prn for 30 days           Verify that the below list of medications is an accurate representation of the medications you are currently taking.  If none reported, the list may be blank. If incorrect, please contact your healthcare provider. Carry this list with you in case of emergency.           Current Medications     ascorbic acid, vitamin C, (VITAMIN C) 250 mg Chew Take by mouth.    blood sugar diagnostic Strp Use for blood sugar testing once daily - product selection permitted    blood-glucose meter Misc Use for blood  "sugar testing as instructed once daily - product selection permitted    cholecalciferol, vitamin D3, (VITAMIN D3) 2,000 unit Tab Take by mouth once daily.    lancets Misc Use for blood sugar testing once daily - product selection permitted    ranitidine (ZANTAC) 300 MG tablet Take 1 tablet (300 mg total) by mouth every evening.    SITagliptan (JANUVIA) 50 MG Tab Take 1 tablet (50 mg total) by mouth once daily.    VITAMIN A ORAL Take by mouth.    omeprazole (PRILOSEC) 40 MG capsule Take 1 capsule (40 mg total) by mouth before breakfast. Take on empty stomach 30-60 minutes before meal           Clinical Reference Information           Your Vitals Were     BP Pulse Temp Height Weight SpO2    130/68 (BP Location: Right arm, Patient Position: Sitting) 68 97.7 °F (36.5 °C) 5' 4" (1.626 m) 81 kg (178 lb 9.6 oz) 97%    BMI                30.66 kg/m2          Blood Pressure          Most Recent Value    BP  130/68      Allergies as of 2/15/2017     Codeine    Penicillins      Immunizations Administered on Date of Encounter - 2/15/2017     None      Language Assistance Services     ATTENTION: Language assistance services are available, free of charge. Please call [phone].      ATENCIÓN: Si marsha marie, tiene a govea disposición servicios gratuitos de asistencia lingüística. Llame al [phone].     MADDI Ý: N?u b?n nói Ti?ng Vi?t, có các d?ch v? h? tr? ngôn ng? mi?n phí dành cho b?n. G?i s? [phone].         [facility]/ [facility] complies with applicable Federal civil rights laws and does not discriminate on the basis of race, color, national origin, age, disability, or sex.        "

## 2017-03-21 ENCOUNTER — PATIENT OUTREACH (OUTPATIENT)
Dept: ADMINISTRATIVE | Facility: HOSPITAL | Age: 72
End: 2017-03-21

## 2017-03-23 ENCOUNTER — NURSE TRIAGE (OUTPATIENT)
Dept: ADMINISTRATIVE | Facility: CLINIC | Age: 72
End: 2017-03-23

## 2017-03-23 NOTE — TELEPHONE ENCOUNTER
"    Reason for Disposition   Swelling began after taking a drug    Protocols used: ST FACE SWELLING-A-OH    Pt reports facial swelling, hives, and itching throughout body after taking niacin this morning. Denies swelling of tongue ro throat. Slight swelling to lips but patient reports that  "they are getting better". Per protocol patient advised to go to PCP office now but nothing was available. Pt verbalized that she was going to go to urgent care.   "

## 2017-03-27 ENCOUNTER — TELEPHONE (OUTPATIENT)
Dept: INTERNAL MEDICINE | Facility: CLINIC | Age: 72
End: 2017-03-27

## 2017-03-27 ENCOUNTER — LAB VISIT (OUTPATIENT)
Dept: LAB | Facility: HOSPITAL | Age: 72
End: 2017-03-27
Attending: INTERNAL MEDICINE
Payer: MEDICARE

## 2017-03-27 DIAGNOSIS — E11.9 TYPE 2 DIABETES MELLITUS WITHOUT COMPLICATION, WITHOUT LONG-TERM CURRENT USE OF INSULIN: ICD-10-CM

## 2017-03-27 LAB
ALBUMIN SERPL BCP-MCNC: 3.8 G/DL
ALP SERPL-CCNC: 68 U/L
ALT SERPL W/O P-5'-P-CCNC: 21 U/L
ANION GAP SERPL CALC-SCNC: 6 MMOL/L
AST SERPL-CCNC: 20 U/L
BILIRUB DIRECT SERPL-MCNC: 0.1 MG/DL
BILIRUB SERPL-MCNC: 0.5 MG/DL
BUN SERPL-MCNC: 17 MG/DL
CALCIUM SERPL-MCNC: 9.7 MG/DL
CHLORIDE SERPL-SCNC: 106 MMOL/L
CHOLEST/HDLC SERPL: 5.6 {RATIO}
CO2 SERPL-SCNC: 28 MMOL/L
CREAT SERPL-MCNC: 0.9 MG/DL
EST. GFR  (AFRICAN AMERICAN): >60 ML/MIN/1.73 M^2
EST. GFR  (NON AFRICAN AMERICAN): >60 ML/MIN/1.73 M^2
ESTIMATED AVG GLUCOSE: 151 MG/DL
GLUCOSE SERPL-MCNC: 143 MG/DL
HBA1C MFR BLD HPLC: 6.9 %
HDL/CHOLESTEROL RATIO: 17.9 %
HDLC SERPL-MCNC: 285 MG/DL
HDLC SERPL-MCNC: 51 MG/DL
LDLC SERPL CALC-MCNC: 194.4 MG/DL
NONHDLC SERPL-MCNC: 234 MG/DL
POTASSIUM SERPL-SCNC: 4.4 MMOL/L
PROT SERPL-MCNC: 7 G/DL
SODIUM SERPL-SCNC: 140 MMOL/L
TRIGL SERPL-MCNC: 198 MG/DL

## 2017-03-27 PROCEDURE — 36415 COLL VENOUS BLD VENIPUNCTURE: CPT

## 2017-03-27 PROCEDURE — 80061 LIPID PANEL: CPT

## 2017-03-27 PROCEDURE — 80048 BASIC METABOLIC PNL TOTAL CA: CPT

## 2017-03-27 PROCEDURE — 83036 HEMOGLOBIN GLYCOSYLATED A1C: CPT

## 2017-03-27 PROCEDURE — 80076 HEPATIC FUNCTION PANEL: CPT

## 2017-03-27 RX ORDER — ERGOCALCIFEROL 1.25 MG/1
50000 CAPSULE ORAL
Qty: 12 CAPSULE | Refills: 4 | Status: SHIPPED | OUTPATIENT
Start: 2017-03-27 | End: 2017-08-17

## 2017-04-04 ENCOUNTER — OFFICE VISIT (OUTPATIENT)
Dept: ALLERGY | Facility: CLINIC | Age: 72
End: 2017-04-04
Payer: MEDICARE

## 2017-04-04 ENCOUNTER — OFFICE VISIT (OUTPATIENT)
Dept: INTERNAL MEDICINE | Facility: CLINIC | Age: 72
End: 2017-04-04
Payer: MEDICARE

## 2017-04-04 VITALS
SYSTOLIC BLOOD PRESSURE: 122 MMHG | WEIGHT: 180.13 LBS | HEIGHT: 64 IN | HEART RATE: 66 BPM | BODY MASS INDEX: 30.75 KG/M2 | DIASTOLIC BLOOD PRESSURE: 74 MMHG

## 2017-04-04 VITALS
HEART RATE: 72 BPM | SYSTOLIC BLOOD PRESSURE: 140 MMHG | WEIGHT: 181.44 LBS | BODY MASS INDEX: 30.98 KG/M2 | HEIGHT: 64 IN | DIASTOLIC BLOOD PRESSURE: 70 MMHG

## 2017-04-04 DIAGNOSIS — E11.9 TYPE 2 DIABETES MELLITUS WITHOUT COMPLICATION, WITHOUT LONG-TERM CURRENT USE OF INSULIN: Primary | ICD-10-CM

## 2017-04-04 DIAGNOSIS — J31.0 CHRONIC RHINITIS: ICD-10-CM

## 2017-04-04 DIAGNOSIS — K21.9 LPRD (LARYNGOPHARYNGEAL REFLUX DISEASE): ICD-10-CM

## 2017-04-04 DIAGNOSIS — E78.5 HYPERLIPIDEMIA, UNSPECIFIED HYPERLIPIDEMIA TYPE: ICD-10-CM

## 2017-04-04 DIAGNOSIS — E55.9 VITAMIN D DEFICIENCY: ICD-10-CM

## 2017-04-04 DIAGNOSIS — R05.9 COUGH: Primary | ICD-10-CM

## 2017-04-04 PROCEDURE — 99999 PR PBB SHADOW E&M-EST. PATIENT-LVL III: CPT | Mod: PBBFAC,,, | Performed by: INTERNAL MEDICINE

## 2017-04-04 PROCEDURE — 99213 OFFICE O/P EST LOW 20 MIN: CPT | Mod: PBBFAC | Performed by: ALLERGY & IMMUNOLOGY

## 2017-04-04 PROCEDURE — 99214 OFFICE O/P EST MOD 30 MIN: CPT | Mod: S$PBB,,, | Performed by: ALLERGY & IMMUNOLOGY

## 2017-04-04 PROCEDURE — 99214 OFFICE O/P EST MOD 30 MIN: CPT | Mod: S$PBB,,, | Performed by: INTERNAL MEDICINE

## 2017-04-04 PROCEDURE — 99999 PR PBB SHADOW E&M-EST. PATIENT-LVL III: CPT | Mod: PBBFAC,,, | Performed by: ALLERGY & IMMUNOLOGY

## 2017-04-04 RX ORDER — GLUCOSAM/CHON-MSM1/C/MANG/BOSW 500-416.6
TABLET ORAL
Refills: 3 | COMMUNITY
Start: 2017-01-05 | End: 2019-02-04

## 2017-04-04 RX ORDER — OMEGA-3-ACID ETHYL ESTERS 1 G/1
2 CAPSULE, LIQUID FILLED ORAL 2 TIMES DAILY
Qty: 360 CAPSULE | Refills: 3 | Status: SHIPPED | OUTPATIENT
Start: 2017-04-04 | End: 2017-08-29 | Stop reason: SDUPTHER

## 2017-04-04 NOTE — MR AVS SNAPSHOT
[facility]  [address] [facility] [address]  Phone: [phone]  Fax: [phone]                  Loni Heard   2017 9:00 AM   Office Visit    Description:  Female : 1945   Provider:  Mariaelena Mallory MD   Department:  [facility]           Reason for Visit     Follow-up           Diagnoses this Visit        Comments    Type 2 diabetes mellitus without complication, without long-term current use of insulin    -  Primary     Vitamin D deficiency         LPRD (laryngopharyngeal reflux disease)                To Do List           Future Appointments        Provider Department Dept Phone    2017 11:00 AM YNES Haji III, MD [facility] - Allergy/ Immunology [phone]      Goals (5 Years of Data)     None       These Medications        Disp Refills Start End    omega-3 acid ethyl esters (LOVAZA) 1 gram capsule 360 capsule 3 2017    Take 2 capsules (2 g total) by mouth 2 (two) times daily. - Oral    Pharmacy: [facility] [address] [place] [address] Ph #: [phone]         [facility]     [facility] Care [facility] -  Assistance  Unless otherwise directed by your provider, please contact [facility], our nurse care line that is available for  assistance.     Registered nurses in the [facility] provide: appointment scheduling, clinical advisement, health education, and other advisory services.  Call: [phone] (toll free)               Medications           Message regarding Medications     Verify the changes and/or additions to your medication regime listed below are the same as discussed with your clinician today.  If any of these changes or additions are incorrect, please notify your healthcare provider.        START taking these NEW medications        Refills    omega-3 acid ethyl esters (LOVAZA) 1 gram capsule 3    Sig: Take 2  "capsules (2 g total) by mouth 2 (two) times daily.    Class: Print    Route: Oral           Verify that the below list of medications is an accurate representation of the medications you are currently taking.  If none reported, the list may be blank. If incorrect, please contact your healthcare provider. Carry this list with you in case of emergency.           Current Medications     ascorbic acid, vitamin C, (VITAMIN C) 250 mg Chew Take by mouth.    blood sugar diagnostic Strp Use for blood sugar testing once daily - product selection permitted    blood-glucose meter Misc Use for blood sugar testing as instructed once daily - product selection permitted    cholecalciferol, vitamin D3, (VITAMIN D3) 2,000 unit Tab Take by mouth once daily.    ergocalciferol (ERGOCALCIFEROL) 50,000 unit Cap Take 1 capsule (50,000 Units total) by mouth every 7 days.    lancets Misc Use for blood sugar testing once daily - product selection permitted    omeprazole (PRILOSEC) 40 MG capsule Take 1 capsule (40 mg total) by mouth before breakfast. Take on empty stomach 30-60 minutes before meal    ranitidine (ZANTAC) 300 MG tablet Take 1 tablet (300 mg total) by mouth every evening.    SITagliptan (JANUVIA) 50 MG Tab Take 1 tablet (50 mg total) by mouth once daily.    TRUEPLUS LANCETS 28 gauge Misc USE TO TEST BLOOD SUGAR ONCE D    VITAMIN A ORAL Take by mouth.    omega-3 acid ethyl esters (LOVAZA) 1 gram capsule Take 2 capsules (2 g total) by mouth 2 (two) times daily.           Clinical Reference Information           Your Vitals Were     BP Pulse Height Weight BMI    122/74 66 5' 4" (1.626 m) 81.7 kg (180 lb 1.9 oz) 30.92 kg/m2      Blood Pressure          Most Recent Value    BP  122/74      Allergies as of 4/4/2017     Codeine    Penicillins    Niacin Preparations      Immunizations Administered on Date of Encounter - 4/4/2017     None      Orders Placed During Today's Visit     Future Labs/Procedures Expected by Expires    Basic " metabolic panel  8/2/2017 (Approximate) 10/1/2017    Hemoglobin A1c  8/2/2017 (Approximate) 10/1/2017    Hepatic function panel  8/2/2017 (Approximate) 10/1/2017    Vitamin D  8/2/2017 10/1/2017      Language Assistance Services     ATTENTION: Language assistance services are available, free of charge. Please call [phone].      ATENCIÓN: Si habla español, tiene a govea disposición servicios gratuitos de asistencia lingüística. Llame al [phone].     CHÚ Ý: N?u b?n nói Ti?ng Vi?t, có các d?ch v? h? tr? ngôn ng? mi?n phí dành cho b?n. G?i s? [phone].         [facility] complies with applicable Federal civil rights laws and does not discriminate on the basis of race, color, national origin, age, disability, or sex.

## 2017-04-04 NOTE — PROGRESS NOTES
"Loni Deluca"Félix returns to clinic today for continued evaluation of chronic rhinitis and a cough. She is here alone. She was last seen February 15, 2017.    Since her last visit, she has been taking omeprazole 40 mg in the morning and ranitidine 300 mg at night. She does feel that she is improving.    She is no longer clearing her throat. Her cough has improved but not completely resolved.    Her rhinitis with postnasal drip and hoarseness have improved as well.    She denies any conjunctivitis.    She denies any wheezing or shortness of breath.    She has not had any acid in her mouth.    [facility] ALLERGY QUESTIONNAIRE SHORT 4/4/2017   Are you taking any new medications since your last visit? Yes   Constitution: No symptoms   Head or facial pain: Headaches   Eyes: Eye pain   Ears: No symptoms   Nose: Post nasal drip   Throat: Frequent clearing, Reflux/ heartburn   Sinuses: No symptoms   Lungs: Cough   Skin: No symptoms   Cardiovascular: No symptoms   Gastrointestinal: No symptoms   Genital/ urinary No symptoms   Musculoskeletal: Muscle pain   Neurologic: Headaches   Endocrine: Diabetes   Hematologic: No symptoms     [facility] PE[facility] ALLERGY QUESTIONNAIRE SHORT 4/4/2017   Are you taking any new medications since your last visit? Yes   Constitution: No symptoms   Head or facial pain: Headaches   Eyes: Eye pain   Ears: No symptoms   Nose: Post nasal drip   Throat: Frequent clearing, Reflux/ heartburn   Sinuses: No symptoms   Lungs: Cough   Skin: No symptoms   Cardiovascular: No symptoms   Gastrointestinal: No symptoms   Genital/ urinary No symptoms   Musculoskeletal: Muscle pain   Neurologic: Headaches   Endocrine: Diabetes   Hematologic: No symptoms     Physical Examination:  General: Well-developed, well-nourished, no acute distress.  Head: No sinus tenderness.  Eyes: Conjunctivae:  No bulbar or palpebral conjunctival injection.  Ears: EAC's clear.  TM's clear.  No pre-auricular nodes.  Nose: Nasal Mucosa:  Pink.  " Septum: No apparent deviation.  Turbinates:  No significant edema.  Polyps/Mass:  None visible.  Teeth/Gums:  No bleeding noted.  Oropharynx: No exudates.  Neck: Supple without thyromegaly. No cervical lymphadenopathy.    Respiratory/Chest: Effort: Good.  Auscultation:  Clear bilaterally.  Skin: Good turgor.  No urticaria or angioedema.  Neuro/Psych: Oriented x 3.    Laboratory 2017:  IgE level: 35.  ImmunoCAP: Negative.  IgA: 137.  Ig.  IgM: 105.  Pneumococcal titers: Protective.    Spirometry 2/15/2017: Normal.    Assessment:  1. Chronic rhinitis, doubt allergic.  2. Chronic cough, doubt allergic.  3. LPR.  4. Mild GERD.  5. Diabetes on Januvia.    Recommendations:  1. Continue omeprazole.  2. Continue ranitidine.  3. Observe symptoms on above.  4. Consider skin testing in the future off antihistamines if needed.  5. Return to clinic in 2-3 months.

## 2017-04-04 NOTE — PROGRESS NOTES
"Subjective:       Patient ID: Loni Heard is a 71 y.o. female.    Chief Complaint: Follow-up   this is a 71-year-old who presents today for follow-up .  Patient reports that she has been doing well with the switch to Januvia.  She was not taking her metformin because she did not tolerate it or was having some diarrhea with it since starting the Januvia she is doing better her blood sugars have improved and she continues to watch her diet she would like to continue with the present dose for now.  She has been swimming and exercising.  She was dealing with a lot in the last year with the death of her mother but thinks she is improving .  She was frustrated with a bronchial infection which lasted for a while causing persistant cough. she was having trouble with reflux and hoarseness and went to see both ALLERGY and ENT for formal diagnosis of lpr.  She has been taking Prilosec in the morning and ranitidine at night for her symptoms.  She's had some improvement in her cough and throat clearing but sometimes at night feels she still has symptoms at times.  She is also concerned about her immunoglobulin level being on the low-normal side she has a follow-up with ALLERGY later today.  She has been getting occasional headache she denies constant headache but reports intermittent feels like her eyes bother her she plans to make an appointment with her ophthalmologist she denies any tearing or drainage.    HPI  Review of Systems   Constitutional:        No recent infection    HENT:        Eyes bother her at times  No vision change   Cough /hoarsness improved     Occasional neck pain gets massages    Musculoskeletal:        Neck bothers her at times  Some parasthesia hands   Foot area prior surgery    Neurological:        Occasional headaches    Psychiatric/Behavioral:        Grief from loss of mother getting better over time         Objective:     Blood pressure 122/74, pulse 66, height 5' 4" (1.626 m), weight 81.7 " kg (180 lb 1.9 oz).    Physical Exam   Constitutional: No distress.   HENT:   Head: Normocephalic.   Mouth/Throat: Oropharynx is clear and moist.   No tenderness or pain on exam     Slight conjunctival injection    Eyes: No scleral icterus.   Neck: Neck supple.   Cardiovascular: Normal rate, regular rhythm and normal heart sounds.    Pulmonary/Chest: Effort normal and breath sounds normal. No respiratory distress.   Abdominal: Soft. Bowel sounds are normal. She exhibits no mass. There is no tenderness.   Musculoskeletal: She exhibits no edema.        Right foot: There is no deformity.        Left foot: There is no deformity.   Feet:   Right Foot:   Protective Sensation: 6 sites tested. 6 sites sensed.   Left Foot:   Protective Sensation: 6 sites tested. 6 sites sensed.   Neurological: She is alert. No cranial nerve deficit.   Skin: No erythema.   Vitals reviewed.      Assessment:       1. Type 2 diabetes mellitus without complication, without long-term current use of insulin    2. Vitamin D deficiency    3. LPRD (laryngopharyngeal reflux disease)    4. Hyperlipidemia, unspecified hyperlipidemia type        Plan:       Loni was seen today for follow-up.    Diagnoses and all orders for this visit:    Type 2 diabetes mellitus without complication, without long-term current use of insulin  -     Basic metabolic panel; Future  -     Hemoglobin A1c; Future  -     Hepatic function panel; Future    Vitamin D deficiency  History of she had not been taking her vitamin D very consistently but started taking the weekly vitamin D again will recheck with her next blood draw  -     Vitamin D; Future    LPRD (laryngopharyngeal reflux disease)  She has seen ALLERGY and ENT reports underwent ALLERGY testing and no significant abnormalities were seen but she did see ENT and is being treated for reflux consider GI follow-up if symptoms persist she has tried work on dietary measures and feels she has had improvement with her  omeprazole to some degree less hoarseness and throat clearing     hyperlipidemia, unspecified hyperlipidemia type   she reports recent trial of niacin otc gave her ALLERGIC reaction   she has been reluctant to take statins she can consider   -     omega-3 acid ethyl esters (LOVAZA) 1 gram capsule; Take 2 capsules (2 g total) by mouth 2 (two) times daily.    Eye symptoms headaches at times she will start with opthomology follow up appt and consider further evalution if persists or concerns  Or new neurolgic symptoms occur     Follow-up 4 months

## 2017-05-29 DIAGNOSIS — E11.9 TYPE 2 DIABETES MELLITUS WITHOUT COMPLICATION: ICD-10-CM

## 2017-06-05 RX ORDER — SITAGLIPTIN 50 MG/1
TABLET, FILM COATED ORAL
Qty: 30 TABLET | Refills: 6 | Status: SHIPPED | OUTPATIENT
Start: 2017-06-05 | End: 2017-10-09 | Stop reason: SDUPTHER

## 2017-06-28 DIAGNOSIS — Z12.11 COLON CANCER SCREENING: ICD-10-CM

## 2017-07-31 ENCOUNTER — LAB VISIT (OUTPATIENT)
Dept: LAB | Facility: HOSPITAL | Age: 72
End: 2017-07-31
Attending: INTERNAL MEDICINE
Payer: MEDICARE

## 2017-07-31 DIAGNOSIS — E55.9 VITAMIN D DEFICIENCY: ICD-10-CM

## 2017-07-31 DIAGNOSIS — E11.9 TYPE 2 DIABETES MELLITUS WITHOUT COMPLICATION, WITHOUT LONG-TERM CURRENT USE OF INSULIN: ICD-10-CM

## 2017-07-31 LAB
25(OH)D3+25(OH)D2 SERPL-MCNC: 29 NG/ML
ALBUMIN SERPL BCP-MCNC: 3.7 G/DL
ALP SERPL-CCNC: 66 U/L
ALT SERPL W/O P-5'-P-CCNC: 23 U/L
ANION GAP SERPL CALC-SCNC: 11 MMOL/L
AST SERPL-CCNC: 20 U/L
BILIRUB DIRECT SERPL-MCNC: 0.2 MG/DL
BILIRUB SERPL-MCNC: 0.5 MG/DL
BUN SERPL-MCNC: 17 MG/DL
CALCIUM SERPL-MCNC: 9.4 MG/DL
CHLORIDE SERPL-SCNC: 107 MMOL/L
CO2 SERPL-SCNC: 23 MMOL/L
CREAT SERPL-MCNC: 0.9 MG/DL
EST. GFR  (AFRICAN AMERICAN): >60 ML/MIN/1.73 M^2
EST. GFR  (NON AFRICAN AMERICAN): >60 ML/MIN/1.73 M^2
ESTIMATED AVG GLUCOSE: 151 MG/DL
GLUCOSE SERPL-MCNC: 154 MG/DL
HBA1C MFR BLD HPLC: 6.9 %
POTASSIUM SERPL-SCNC: 4.1 MMOL/L
PROT SERPL-MCNC: 6.6 G/DL
SODIUM SERPL-SCNC: 141 MMOL/L

## 2017-07-31 PROCEDURE — 83036 HEMOGLOBIN GLYCOSYLATED A1C: CPT

## 2017-07-31 PROCEDURE — 80076 HEPATIC FUNCTION PANEL: CPT

## 2017-07-31 PROCEDURE — 36415 COLL VENOUS BLD VENIPUNCTURE: CPT

## 2017-07-31 PROCEDURE — 80048 BASIC METABOLIC PNL TOTAL CA: CPT

## 2017-07-31 PROCEDURE — 82306 VITAMIN D 25 HYDROXY: CPT

## 2017-08-07 ENCOUNTER — OFFICE VISIT (OUTPATIENT)
Dept: INTERNAL MEDICINE | Facility: CLINIC | Age: 72
End: 2017-08-07
Payer: MEDICARE

## 2017-08-07 ENCOUNTER — OFFICE VISIT (OUTPATIENT)
Dept: ALLERGY | Facility: CLINIC | Age: 72
End: 2017-08-07
Payer: MEDICARE

## 2017-08-07 ENCOUNTER — LAB VISIT (OUTPATIENT)
Dept: LAB | Facility: HOSPITAL | Age: 72
End: 2017-08-07
Attending: INTERNAL MEDICINE
Payer: MEDICARE

## 2017-08-07 VITALS
SYSTOLIC BLOOD PRESSURE: 130 MMHG | HEIGHT: 64 IN | HEART RATE: 60 BPM | DIASTOLIC BLOOD PRESSURE: 72 MMHG | WEIGHT: 181.69 LBS | BODY MASS INDEX: 31.02 KG/M2

## 2017-08-07 VITALS
HEART RATE: 70 BPM | SYSTOLIC BLOOD PRESSURE: 122 MMHG | HEIGHT: 63 IN | DIASTOLIC BLOOD PRESSURE: 74 MMHG | BODY MASS INDEX: 31.64 KG/M2 | WEIGHT: 178.56 LBS

## 2017-08-07 DIAGNOSIS — E11.9 TYPE 2 DIABETES MELLITUS WITHOUT COMPLICATION, WITHOUT LONG-TERM CURRENT USE OF INSULIN: ICD-10-CM

## 2017-08-07 DIAGNOSIS — R05.9 COUGH: Primary | ICD-10-CM

## 2017-08-07 DIAGNOSIS — E55.9 VITAMIN D DEFICIENCY: ICD-10-CM

## 2017-08-07 DIAGNOSIS — E11.9 TYPE 2 DIABETES MELLITUS WITHOUT COMPLICATION, WITHOUT LONG-TERM CURRENT USE OF INSULIN: Primary | ICD-10-CM

## 2017-08-07 DIAGNOSIS — Z23 NEED FOR 23-POLYVALENT PNEUMOCOCCAL POLYSACCHARIDE VACCINE: ICD-10-CM

## 2017-08-07 DIAGNOSIS — K21.9 LPRD (LARYNGOPHARYNGEAL REFLUX DISEASE): ICD-10-CM

## 2017-08-07 DIAGNOSIS — J31.0 CHRONIC RHINITIS, UNSPECIFIED TYPE: ICD-10-CM

## 2017-08-07 DIAGNOSIS — E78.5 HYPERLIPIDEMIA, UNSPECIFIED HYPERLIPIDEMIA TYPE: ICD-10-CM

## 2017-08-07 LAB
CREAT UR-MCNC: 117 MG/DL
MICROALBUMIN UR DL<=1MG/L-MCNC: 9 UG/ML
MICROALBUMIN/CREATININE RATIO: 7.7 UG/MG

## 2017-08-07 PROCEDURE — 1159F MED LIST DOCD IN RCRD: CPT | Mod: ,,, | Performed by: INTERNAL MEDICINE

## 2017-08-07 PROCEDURE — 1126F AMNT PAIN NOTED NONE PRSNT: CPT | Mod: ,,, | Performed by: INTERNAL MEDICINE

## 2017-08-07 PROCEDURE — 3008F BODY MASS INDEX DOCD: CPT | Mod: ,,, | Performed by: ALLERGY & IMMUNOLOGY

## 2017-08-07 PROCEDURE — 99999 PR PBB SHADOW E&M-EST. PATIENT-LVL III: CPT | Mod: PBBFAC,,, | Performed by: INTERNAL MEDICINE

## 2017-08-07 PROCEDURE — 99214 OFFICE O/P EST MOD 30 MIN: CPT | Mod: S$PBB,,, | Performed by: INTERNAL MEDICINE

## 2017-08-07 PROCEDURE — 1159F MED LIST DOCD IN RCRD: CPT | Mod: ,,, | Performed by: ALLERGY & IMMUNOLOGY

## 2017-08-07 PROCEDURE — 3044F HG A1C LEVEL LT 7.0%: CPT | Mod: ,,, | Performed by: INTERNAL MEDICINE

## 2017-08-07 PROCEDURE — 82570 ASSAY OF URINE CREATININE: CPT

## 2017-08-07 PROCEDURE — 1126F AMNT PAIN NOTED NONE PRSNT: CPT | Mod: ,,, | Performed by: ALLERGY & IMMUNOLOGY

## 2017-08-07 PROCEDURE — 99214 OFFICE O/P EST MOD 30 MIN: CPT | Mod: S$PBB,,, | Performed by: ALLERGY & IMMUNOLOGY

## 2017-08-07 PROCEDURE — 3044F HG A1C LEVEL LT 7.0%: CPT | Mod: ,,, | Performed by: ALLERGY & IMMUNOLOGY

## 2017-08-07 PROCEDURE — 99999 PR PBB SHADOW E&M-EST. PATIENT-LVL III: CPT | Mod: PBBFAC,,, | Performed by: ALLERGY & IMMUNOLOGY

## 2017-08-07 PROCEDURE — 3008F BODY MASS INDEX DOCD: CPT | Mod: ,,, | Performed by: INTERNAL MEDICINE

## 2017-08-07 RX ORDER — EZETIMIBE 10 MG/1
10 TABLET ORAL DAILY
Qty: 30 TABLET | Refills: 6 | Status: SHIPPED | OUTPATIENT
Start: 2017-08-07 | End: 2017-08-17 | Stop reason: SDUPTHER

## 2017-08-07 NOTE — PROGRESS NOTES
Answers for HPI/ROS submitted by the patient on 8/5/2017   activity change: No  unexpected weight change: No  neck pain: No  hearing loss: No  rhinorrhea: No  trouble swallowing: No  eye discharge: No  visual disturbance: No  chest tightness: No  wheezing: No  chest pain: No  palpitations: No  blood in stool: No  constipation: No  vomiting: No  diarrhea: No  polydipsia: No  polyuria: No  difficulty urinating: No  hematuria: No  menstrual problem: No  dysuria: No  joint swelling: No  arthralgias: No  headaches: No  weakness: No  confusion: No  dysphoric mood: No

## 2017-08-07 NOTE — PROGRESS NOTES
Subjective:       Patient ID: Loni Heard is a 71 y.o. female.    Chief Complaint: Follow-up   this is a 71-year-old who presents today for follow-up.  Patient reports that she has been doing well in general with her diabetes control she has been more consistently compliant with her medications she seems to tolerate Januvia without difficulty.  She had some recent outlying labs through her insurance and reports her cholesterol was high.  Patient has stopped statins due to intolerance in the past and her concerns about the medication.  She would like to try to get on a cholesterol-lowering would be agreeable to considering alternate agent other than stain at this time she had a recent cyst removed from her eyelids so hasn't been swimming just the last week or so but prior to that time she was swimming regulate for exercise which she enjoys.  She has been taking her vitamin D.  She has followed with ALLERGY for her postnasal drip throat clearing and was placed on medications for laryngeal pharyngeal reflux with some improvement over time. She continues to have some throat clearing.       HPI  Review of Systems   Constitutional: Negative for activity change and unexpected weight change.   HENT: Negative for hearing loss, rhinorrhea and trouble swallowing.    Eyes: Negative for discharge and visual disturbance.   Respiratory: Negative for chest tightness and wheezing.    Cardiovascular: Negative for chest pain and palpitations.   Gastrointestinal: Negative for blood in stool, constipation, diarrhea and vomiting.   Endocrine: Negative for polydipsia and polyuria.   Genitourinary: Negative for difficulty urinating, dysuria, hematuria and menstrual problem.   Musculoskeletal: Negative for arthralgias, joint swelling and neck pain.   Neurological: Negative for weakness and headaches.   Psychiatric/Behavioral: Negative for confusion and dysphoric mood.       Objective:     Blood pressure 122/74, pulse 70, height 5'  "3" (1.6 m), weight 81 kg (178 lb 9.2 oz).    Physical Exam   Constitutional: No distress.   HENT:   Head: Normocephalic.   Mouth/Throat: Oropharynx is clear and moist.   Eyes: No scleral icterus.   Neck: Neck supple.   Cardiovascular: Normal rate, regular rhythm and normal heart sounds.  Exam reveals no gallop and no friction rub.    No murmur heard.  Pulmonary/Chest: Effort normal and breath sounds normal. No respiratory distress.   Abdominal: Soft. Bowel sounds are normal. She exhibits no mass. There is no tenderness.   Musculoskeletal: She exhibits no edema.   Neurological: She is alert.   Skin: No erythema.   Psychiatric: She has a normal mood and affect.   Vitals reviewed.      Assessment:       1. Type 2 diabetes mellitus without complication, without long-term current use of insulin    2. Need for 23-polyvalent pneumococcal polysaccharide vaccine    3. Hyperlipidemia, unspecified hyperlipidemia type    4. Vitamin D deficiency        Plan:       Loni was seen today for follow-up.    Diagnoses and all orders for this visit:    Type 2 diabetes mellitus without complication, without long-term current use of insulin  Numbers improved with improved compliance with medication she is tolerating Januvia hemoglobin A1c improved maintain present regimen  -     Microalbumin/creatinine urine ratio; Future  -     Basic metabolic panel; Future  -     CBC auto differential; Future  -     Hemoglobin A1c; Future  -     Hepatic function panel; Future  -     Lipid panel; Future    Need for 23-polyvalent pneumococcal polysaccharide vaccine  -     Pneumococcal Polysaccharide Vaccine (23 Valent) (SQ/IM)  Today     Hyperlipidemia, unspecified hyperlipidemia type  History of she has been intolerant of statins   -  she takes fish oil and would be agreeable to trying Zetia instead prescription provided   Lipid panel; Future    Vitamin D deficiency  conitnue current medication   -     Vitamin D; Future    -     ezetimibe (ZETIA) " 10 mg tablet; Take 1 tablet (10 mg total) by mouth once daily.    She is up to date on her mammogram and eye exam  Discussed fit screening for colon cancer she is agreeable at this time        Follow up 4 months

## 2017-08-07 NOTE — PROGRESS NOTES
"Loni Deluca"Félix returns to clinic today for continued evaluation of chronic rhinitis and a cough. She is here alone. She was last seen 2017.    She has continued to take omeprazole 40 mg in the morning and ranitidine 300 mg at night. She thinks that she is "70%" better. She continues to have some postnasal drip, throat clearing, a sensation that something is in the back of the throat, and a nonproductive cough.    She is not having any indigestion or heartburn.    She denies any conjunctivitis.     She is swimming now. She does get water in her years.    She denies any wheezing or shortness of breath.    [facility] PE[facility] ALLERGY QUESTIONNAIRE SHORT 2017   Are you taking any new medications since your last visit? No   Constitution: No symptoms   Head or facial pain: No symptoms   Eyes: Eye surgery   Ears: No symptoms   Nose: Post nasal drip   Throat: Frequent clearing   Sinuses: No symptoms   Lungs: Cough   Skin: No symptoms   Cardiovascular: No symptoms   Gastrointestinal: Constipation   Genital/ urinary No symptoms   Musculoskeletal: Joint pain   Neurologic: No symptoms   Endocrine: Diabetes   Hematologic: No symptoms     Physical Examination:  General: Well-developed, well-nourished, no acute distress.  Head: No sinus tenderness.  Eyes: Conjunctivae:  No bulbar or palpebral conjunctival injection.  Ears: EAC's clear.  TM's clear.  No pre-auricular nodes.  Nose: Nasal Mucosa:  Pink.  Septum: No apparent deviation.  Turbinates:  No significant edema.  Polyps/Mass:  None visible.  Teeth/Gums:  No bleeding noted.  Oropharynx: No exudates.  Neck: Supple without thyromegaly. No cervical lymphadenopathy.    Respiratory/Chest: Effort: Good.  Auscultation:  Clear bilaterally.  Skin: Good turgor.  No urticaria or angioedema.  Neuro/Psych: Oriented x 3.    Laboratory 2017:  IgE level: 35.  ImmunoCAP: Negative.  IgA: 137.  Ig.  IgM: 105.  Pneumococcal titers: Protective.    Spirometry 2/15/2017: " Normal.    Assessment:  1. Chronic rhinitis, doubt allergic.  2. Chronic cough, doubt allergic.  3. LPR.  4. Mild GERD.  5. Diabetes on Januvia.  6. Hyperlipidemia on Zetia.  7. Vitamin D deficiency on replacement.    Recommendations:  1. Continue omeprazole.  2. Continue ranitidine.  3. Inhalant skin testing off ranitidine for 3 days.  4. Return to clinic in 2 weeks.  5. Consider GI evaluation.

## 2017-08-08 ENCOUNTER — PATIENT MESSAGE (OUTPATIENT)
Dept: INTERNAL MEDICINE | Facility: CLINIC | Age: 72
End: 2017-08-08

## 2017-08-16 ENCOUNTER — LAB VISIT (OUTPATIENT)
Dept: LAB | Facility: HOSPITAL | Age: 72
End: 2017-08-16
Attending: INTERNAL MEDICINE
Payer: MEDICARE

## 2017-08-16 DIAGNOSIS — Z12.11 COLON CANCER SCREENING: ICD-10-CM

## 2017-08-16 LAB — HEMOCCULT STL QL IA: NEGATIVE

## 2017-08-16 PROCEDURE — 82274 ASSAY TEST FOR BLOOD FECAL: CPT

## 2017-08-17 ENCOUNTER — OFFICE VISIT (OUTPATIENT)
Dept: ALLERGY | Facility: CLINIC | Age: 72
End: 2017-08-17
Payer: MEDICARE

## 2017-08-17 VITALS
WEIGHT: 183.44 LBS | SYSTOLIC BLOOD PRESSURE: 132 MMHG | HEART RATE: 79 BPM | OXYGEN SATURATION: 96 % | DIASTOLIC BLOOD PRESSURE: 82 MMHG | BODY MASS INDEX: 31.32 KG/M2 | HEIGHT: 64 IN

## 2017-08-17 DIAGNOSIS — R05.9 COUGH: Primary | ICD-10-CM

## 2017-08-17 DIAGNOSIS — K21.9 LPRD (LARYNGOPHARYNGEAL REFLUX DISEASE): ICD-10-CM

## 2017-08-17 DIAGNOSIS — J31.0 OTHER CHRONIC RHINITIS: ICD-10-CM

## 2017-08-17 PROCEDURE — 3079F DIAST BP 80-89 MM HG: CPT | Mod: ,,, | Performed by: ALLERGY & IMMUNOLOGY

## 2017-08-17 PROCEDURE — 1126F AMNT PAIN NOTED NONE PRSNT: CPT | Mod: ,,, | Performed by: ALLERGY & IMMUNOLOGY

## 2017-08-17 PROCEDURE — 1159F MED LIST DOCD IN RCRD: CPT | Mod: ,,, | Performed by: ALLERGY & IMMUNOLOGY

## 2017-08-17 PROCEDURE — 3075F SYST BP GE 130 - 139MM HG: CPT | Mod: ,,, | Performed by: ALLERGY & IMMUNOLOGY

## 2017-08-17 PROCEDURE — 99213 OFFICE O/P EST LOW 20 MIN: CPT | Mod: 25,S$PBB,, | Performed by: ALLERGY & IMMUNOLOGY

## 2017-08-17 PROCEDURE — 95004 PERQ TESTS W/ALRGNC XTRCS: CPT | Mod: S$PBB,,, | Performed by: ALLERGY & IMMUNOLOGY

## 2017-08-17 PROCEDURE — 3008F BODY MASS INDEX DOCD: CPT | Mod: ,,, | Performed by: ALLERGY & IMMUNOLOGY

## 2017-08-17 PROCEDURE — 99999 PR PBB SHADOW E&M-EST. PATIENT-LVL III: CPT | Mod: PBBFAC,,, | Performed by: ALLERGY & IMMUNOLOGY

## 2017-08-17 PROCEDURE — 95004 PERQ TESTS W/ALRGNC XTRCS: CPT | Mod: PBBFAC | Performed by: ALLERGY & IMMUNOLOGY

## 2017-08-17 PROCEDURE — 99213 OFFICE O/P EST LOW 20 MIN: CPT | Mod: PBBFAC,25 | Performed by: ALLERGY & IMMUNOLOGY

## 2017-08-17 RX ORDER — OMEPRAZOLE 40 MG/1
40 CAPSULE, DELAYED RELEASE ORAL EVERY MORNING
Qty: 60 CAPSULE | Refills: 3 | Status: SHIPPED | OUTPATIENT
Start: 2017-08-17 | End: 2017-10-16 | Stop reason: SDUPTHER

## 2017-08-17 RX ORDER — EZETIMIBE 10 MG/1
10 TABLET ORAL NIGHTLY
COMMUNITY
Start: 2017-08-09 | End: 2018-04-03 | Stop reason: SDUPTHER

## 2017-08-17 NOTE — PROGRESS NOTES
"Loni "Perla Heard returns to clinic today for continued evaluation of chronic rhinitis and a cough. She was  Last seen     Since her last visit, she has been taking omeprazole 1 mg in the morning and ranitidine 300 mg at night.    She is much improved since she began these medications. She does however continue to have postnasal drip, throat clearing, a sensation that something is in the back throat, and a nonproductive cough.    She denies any conjunctivitis.    She denies any wheezing or shortness of breath.    [facility] PE[facility] ALLERGY QUESTIONNAIRE SHORT 2017   Are you taking any new medications since your last visit? Yes   Constitution: No symptoms   Head or facial pain: No symptoms   Eyes: No symptoms   Ears: No symptoms   Nose: Post nasal drip   Throat: Frequent clearing   Sinuses: No symptoms   Lungs: Cough   Skin: No symptoms   Cardiovascular: No symptoms   Gastrointestinal: No symptoms   Genital/ urinary No symptoms   Musculoskeletal: No symptoms   Neurologic: No symptoms   Endocrine: Diabetes   Hematologic: No symptoms     Physical Examination:  General: Well-developed, well-nourished, no acute distress.  Head: No sinus tenderness.  Eyes: Conjunctivae:  No bulbar or palpebral conjunctival injection.  Ears: EAC's clear.  TM's clear.  No pre-auricular nodes.  Nose: Nasal Mucosa:  Pink.  Septum: No apparent deviation.  Turbinates:  No significant edema.  Polyps/Mass:  None visible.  Teeth/Gums:  No bleeding noted.  Oropharynx: No exudates.  Neck: Supple without thyromegaly. No cervical lymphadenopathy.    Respiratory/Chest: Effort: Good.  Auscultation:  Clear bilaterally.  Skin: Good turgor.  No urticaria or angioedema.  Neuro/Psych: Oriented x 3.    Laboratory 2017:  IgE level: 35.  ImmunoCAP: Negative.  IgA: 137.  Ig.  IgM: 105.  Pneumococcal titers: Protective.    Spirometry 2/15/2017: Normal.    Inhalant skin tests prick #60 2017:  3+ histamine control. All tests are " negative.    Assessment:  1. Chronic rhinitis, not allergic.  2. Chronic cough, not allergic.  3. LPR.  4. Mild GERD.  5. Diabetes on Januvia.  6. Hyperlipidemia on Zetia.  7. Vitamin D deficiency on replacement.    Recommendations:  1. Increase omeprazole to 40 mg twice a day.  2. Discontinue ranitidine for now.  3. Follow symptoms on above.  4. Return to clinic in 2-3 months or sooner if needed.  5. Consider GI evaluation.

## 2017-08-23 ENCOUNTER — PATIENT MESSAGE (OUTPATIENT)
Dept: INTERNAL MEDICINE | Facility: CLINIC | Age: 72
End: 2017-08-23

## 2017-08-27 ENCOUNTER — PATIENT MESSAGE (OUTPATIENT)
Dept: INTERNAL MEDICINE | Facility: CLINIC | Age: 72
End: 2017-08-27

## 2017-08-29 ENCOUNTER — OFFICE VISIT (OUTPATIENT)
Dept: INTERNAL MEDICINE | Facility: CLINIC | Age: 72
End: 2017-08-29
Payer: MEDICARE

## 2017-08-29 ENCOUNTER — LAB VISIT (OUTPATIENT)
Dept: LAB | Facility: HOSPITAL | Age: 72
End: 2017-08-29
Attending: INTERNAL MEDICINE
Payer: MEDICARE

## 2017-08-29 VITALS
OXYGEN SATURATION: 98 % | WEIGHT: 179.69 LBS | HEART RATE: 68 BPM | HEIGHT: 63 IN | DIASTOLIC BLOOD PRESSURE: 78 MMHG | SYSTOLIC BLOOD PRESSURE: 124 MMHG | BODY MASS INDEX: 31.84 KG/M2

## 2017-08-29 DIAGNOSIS — R10.9 ABDOMINAL PAIN, UNSPECIFIED LOCATION: ICD-10-CM

## 2017-08-29 DIAGNOSIS — R10.9 LEFT LATERAL ABDOMINAL PAIN: ICD-10-CM

## 2017-08-29 DIAGNOSIS — R10.9 ABDOMINAL PAIN, UNSPECIFIED LOCATION: Primary | ICD-10-CM

## 2017-08-29 LAB
ALBUMIN SERPL BCP-MCNC: 3.8 G/DL
ALP SERPL-CCNC: 70 U/L
ALT SERPL W/O P-5'-P-CCNC: 21 U/L
AMYLASE SERPL-CCNC: 51 U/L
ANION GAP SERPL CALC-SCNC: 8 MMOL/L
AST SERPL-CCNC: 18 U/L
BASOPHILS # BLD AUTO: 0.02 K/UL
BASOPHILS NFR BLD: 0.3 %
BILIRUB DIRECT SERPL-MCNC: 0.2 MG/DL
BILIRUB SERPL-MCNC: 0.5 MG/DL
BUN SERPL-MCNC: 16 MG/DL
CALCIUM SERPL-MCNC: 9.9 MG/DL
CHLORIDE SERPL-SCNC: 105 MMOL/L
CO2 SERPL-SCNC: 28 MMOL/L
CREAT SERPL-MCNC: 0.9 MG/DL
DIFFERENTIAL METHOD: NORMAL
EOSINOPHIL # BLD AUTO: 0.1 K/UL
EOSINOPHIL NFR BLD: 0.9 %
ERYTHROCYTE [DISTWIDTH] IN BLOOD BY AUTOMATED COUNT: 12.7 %
EST. GFR  (AFRICAN AMERICAN): >60 ML/MIN/1.73 M^2
EST. GFR  (NON AFRICAN AMERICAN): >60 ML/MIN/1.73 M^2
GLUCOSE SERPL-MCNC: 148 MG/DL
HCT VFR BLD AUTO: 41.6 %
HGB BLD-MCNC: 14 G/DL
LIPASE SERPL-CCNC: 39 U/L
LYMPHOCYTES # BLD AUTO: 3 K/UL
LYMPHOCYTES NFR BLD: 39.2 %
MCH RBC QN AUTO: 29.4 PG
MCHC RBC AUTO-ENTMCNC: 33.7 G/DL
MCV RBC AUTO: 87 FL
MONOCYTES # BLD AUTO: 0.6 K/UL
MONOCYTES NFR BLD: 7.2 %
NEUTROPHILS # BLD AUTO: 4 K/UL
NEUTROPHILS NFR BLD: 52.3 %
PLATELET # BLD AUTO: 245 K/UL
PMV BLD AUTO: 11.3 FL
POTASSIUM SERPL-SCNC: 5.1 MMOL/L
PROT SERPL-MCNC: 7.3 G/DL
RBC # BLD AUTO: 4.76 M/UL
SODIUM SERPL-SCNC: 141 MMOL/L
WBC # BLD AUTO: 7.61 K/UL

## 2017-08-29 PROCEDURE — 1126F AMNT PAIN NOTED NONE PRSNT: CPT | Mod: ,,, | Performed by: INTERNAL MEDICINE

## 2017-08-29 PROCEDURE — 99214 OFFICE O/P EST MOD 30 MIN: CPT | Mod: S$PBB,,, | Performed by: INTERNAL MEDICINE

## 2017-08-29 PROCEDURE — 3078F DIAST BP <80 MM HG: CPT | Mod: ,,, | Performed by: INTERNAL MEDICINE

## 2017-08-29 PROCEDURE — 82150 ASSAY OF AMYLASE: CPT

## 2017-08-29 PROCEDURE — 36415 COLL VENOUS BLD VENIPUNCTURE: CPT

## 2017-08-29 PROCEDURE — 85025 COMPLETE CBC W/AUTO DIFF WBC: CPT

## 2017-08-29 PROCEDURE — 1159F MED LIST DOCD IN RCRD: CPT | Mod: ,,, | Performed by: INTERNAL MEDICINE

## 2017-08-29 PROCEDURE — 80048 BASIC METABOLIC PNL TOTAL CA: CPT

## 2017-08-29 PROCEDURE — 99999 PR PBB SHADOW E&M-EST. PATIENT-LVL III: CPT | Mod: PBBFAC,,, | Performed by: INTERNAL MEDICINE

## 2017-08-29 PROCEDURE — 80076 HEPATIC FUNCTION PANEL: CPT

## 2017-08-29 PROCEDURE — 99213 OFFICE O/P EST LOW 20 MIN: CPT | Mod: PBBFAC | Performed by: INTERNAL MEDICINE

## 2017-08-29 PROCEDURE — 3074F SYST BP LT 130 MM HG: CPT | Mod: ,,, | Performed by: INTERNAL MEDICINE

## 2017-08-29 PROCEDURE — 83690 ASSAY OF LIPASE: CPT

## 2017-08-29 RX ORDER — OMEGA-3-ACID ETHYL ESTERS 1 G/1
1 CAPSULE, LIQUID FILLED ORAL 2 TIMES DAILY
Qty: 180 CAPSULE | Refills: 1 | Status: SHIPPED | OUTPATIENT
Start: 2017-08-29 | End: 2019-08-13

## 2017-08-29 NOTE — PROGRESS NOTES
"Subjective:       Patient ID: Loni Heard is a 71 y.o. female.    Chief Complaint: Abdominal Pain (lump )   this is a 71-year-old who presents today with area of concern in her left abdomen patient reports over the last week or so she's noticed an area of discomfort on the left side of her abdomen she was wondering if it was related to any of her new medications she denies nausea vomiting or diarrhea associated.  She describes a burning sensation when she touches the area and it feels better when she holds it.  She was concerned about possible hernia does seem to increase in size when she stands.  She has had no injury to the area or bruising that she is aware of.  She has been tolerating her new medications including Januvia and that he without difficulty.  No generalized myalgias or concern she has also been taking high-dose fish oil and would like to reduce that now she is taking her Zetia    HPI  Review of Systems   Constitutional: Negative for fever.   Gastrointestinal: Negative for diarrhea and nausea.        Discomfort abdomen left side lump area  Painful to touch  Worried about hernia        Objective:     Blood pressure 124/78, pulse 68, height 5' 3" (1.6 m), weight 81.5 kg (179 lb 10.8 oz), SpO2 98 %.    Physical Exam   Constitutional: No distress.   HENT:   Head: Normocephalic.   Mouth/Throat: Oropharynx is clear and moist.   Eyes: No scleral icterus.   Neck: Neck supple.   Cardiovascular: Normal rate, regular rhythm and normal heart sounds.  Exam reveals no gallop and no friction rub.    No murmur heard.  Pulmonary/Chest: Effort normal and breath sounds normal. No respiratory distress.   Abdominal: Soft. Bowel sounds are normal. She exhibits no mass. There is no tenderness.   Palpable area discomfort left  Some protrusion with standing     No rebound or guarding   No erythema    Musculoskeletal: She exhibits no edema.   Neurological: She is alert.   Skin: No erythema.   Vitals reviewed.    "   Assessment:       1. Abdominal pain, unspecified location    2. Left lateral abdominal pain    3. Lump        Plan:       Loni was seen today for abdominal pain.    Diagnoses and all orders for this visit:    Abdominal pain, unspecified location  -     Basic metabolic panel; Future  -     CBC auto differential; Future  -     Hepatic function panel; Future  -     Amylase; Future  -     Lipase; Future  -    Left lateral abdominal pain  Lump evlauate for possible hernia /other   -     CT Abdomen Pelvis W Wo Contrast; Future   CT for further evaluation         hyperlipidemia elevated triglycerides she can reduce her fish oil as planned  -     omega-3 acid ethyl esters (LOVAZA) 1 gram capsule; Take 1 capsule (1 g total) by mouth 2 (two) times daily.

## 2017-08-30 ENCOUNTER — HOSPITAL ENCOUNTER (OUTPATIENT)
Dept: RADIOLOGY | Facility: HOSPITAL | Age: 72
Discharge: HOME | End: 2017-08-30
Attending: INTERNAL MEDICINE
Payer: MEDICARE

## 2017-08-30 DIAGNOSIS — R10.9 LEFT LATERAL ABDOMINAL PAIN: ICD-10-CM

## 2017-08-30 PROCEDURE — 25500020 PHARM REV CODE 255: Performed by: INTERNAL MEDICINE

## 2017-08-30 PROCEDURE — 74176 CT ABD & PELVIS W/O CONTRAST: CPT | Mod: TC

## 2017-08-30 PROCEDURE — 74176 CT ABD & PELVIS W/O CONTRAST: CPT | Mod: 26,GC,, | Performed by: RADIOLOGY

## 2017-08-30 RX ADMIN — IOHEXOL 15 ML: 350 INJECTION, SOLUTION INTRAVENOUS at 12:08

## 2017-08-30 RX ADMIN — IOHEXOL 15 ML: 350 INJECTION, SOLUTION INTRAVENOUS at 01:08

## 2017-09-01 ENCOUNTER — TELEPHONE (OUTPATIENT)
Dept: INTERNAL MEDICINE | Facility: CLINIC | Age: 72
End: 2017-09-01

## 2017-09-01 NOTE — TELEPHONE ENCOUNTER
Mammogram outlying dis left   Reviewed dr. Donald osullivan  Dense breeast /calcifications benign let  6 moisés repeat dis

## 2017-10-06 ENCOUNTER — PATIENT MESSAGE (OUTPATIENT)
Dept: INTERNAL MEDICINE | Facility: CLINIC | Age: 72
End: 2017-10-06

## 2017-10-09 DIAGNOSIS — E11.9 TYPE 2 DIABETES MELLITUS WITHOUT COMPLICATION, WITHOUT LONG-TERM CURRENT USE OF INSULIN: Primary | ICD-10-CM

## 2017-10-09 NOTE — TELEPHONE ENCOUNTER
Check and see if [facility]  Can assist her in her januvia need  Prior auth or tier exception  See printed rx if needed and message when william returns

## 2017-10-13 ENCOUNTER — PATIENT MESSAGE (OUTPATIENT)
Dept: INTERNAL MEDICINE | Facility: CLINIC | Age: 72
End: 2017-10-13

## 2017-10-13 ENCOUNTER — TELEPHONE (OUTPATIENT)
Dept: PULMONOLOGY | Facility: CLINIC | Age: 72
End: 2017-10-13

## 2017-10-16 ENCOUNTER — PATIENT MESSAGE (OUTPATIENT)
Dept: ALLERGY | Facility: CLINIC | Age: 72
End: 2017-10-16

## 2017-10-16 DIAGNOSIS — K21.9 LARYNGOPHARYNGEAL REFLUX (LPR): Primary | ICD-10-CM

## 2017-10-17 RX ORDER — OMEPRAZOLE 40 MG/1
40 CAPSULE, DELAYED RELEASE ORAL EVERY MORNING
Qty: 60 CAPSULE | Refills: 3 | Status: SHIPPED | OUTPATIENT
Start: 2017-10-17 | End: 2017-12-08

## 2017-10-17 RX ORDER — PANTOPRAZOLE SODIUM 40 MG/1
40 TABLET, DELAYED RELEASE ORAL DAILY
Qty: 30 TABLET | Refills: 3 | Status: SHIPPED | OUTPATIENT
Start: 2017-10-17 | End: 2018-02-20 | Stop reason: SDUPTHER

## 2017-10-18 NOTE — TELEPHONE ENCOUNTER
I have not seen the form  Unless it was the rx you already sent  There was not information regarding tiers on it  But a rx for januvia which you already faxed back   Please check with pt or see if you can get form as requested  If that was not all needed

## 2017-10-26 ENCOUNTER — PATIENT MESSAGE (OUTPATIENT)
Dept: INTERNAL MEDICINE | Facility: CLINIC | Age: 72
End: 2017-10-26

## 2017-11-30 ENCOUNTER — LAB VISIT (OUTPATIENT)
Dept: LAB | Facility: HOSPITAL | Age: 72
End: 2017-11-30
Attending: INTERNAL MEDICINE
Payer: MEDICARE

## 2017-11-30 DIAGNOSIS — E11.9 TYPE 2 DIABETES MELLITUS WITHOUT COMPLICATION, WITHOUT LONG-TERM CURRENT USE OF INSULIN: ICD-10-CM

## 2017-11-30 DIAGNOSIS — E78.5 HYPERLIPIDEMIA, UNSPECIFIED HYPERLIPIDEMIA TYPE: ICD-10-CM

## 2017-11-30 DIAGNOSIS — E55.9 VITAMIN D DEFICIENCY: ICD-10-CM

## 2017-11-30 LAB
25(OH)D3+25(OH)D2 SERPL-MCNC: 29 NG/ML
ALBUMIN SERPL BCP-MCNC: 3.9 G/DL
ALP SERPL-CCNC: 65 U/L
ALT SERPL W/O P-5'-P-CCNC: 20 U/L
ANION GAP SERPL CALC-SCNC: 10 MMOL/L
AST SERPL-CCNC: 22 U/L
BASOPHILS # BLD AUTO: 0.01 K/UL
BASOPHILS NFR BLD: 0.1 %
BILIRUB DIRECT SERPL-MCNC: 0.2 MG/DL
BILIRUB SERPL-MCNC: 0.6 MG/DL
BUN SERPL-MCNC: 21 MG/DL
CALCIUM SERPL-MCNC: 9.9 MG/DL
CHLORIDE SERPL-SCNC: 106 MMOL/L
CHOLEST SERPL-MCNC: 224 MG/DL
CHOLEST/HDLC SERPL: 4.8 {RATIO}
CO2 SERPL-SCNC: 26 MMOL/L
CREAT SERPL-MCNC: 0.8 MG/DL
DIFFERENTIAL METHOD: NORMAL
EOSINOPHIL # BLD AUTO: 0.1 K/UL
EOSINOPHIL NFR BLD: 1.2 %
ERYTHROCYTE [DISTWIDTH] IN BLOOD BY AUTOMATED COUNT: 12.7 %
EST. GFR  (AFRICAN AMERICAN): >60 ML/MIN/1.73 M^2
EST. GFR  (NON AFRICAN AMERICAN): >60 ML/MIN/1.73 M^2
ESTIMATED AVG GLUCOSE: 128 MG/DL
GLUCOSE SERPL-MCNC: 124 MG/DL
HBA1C MFR BLD HPLC: 6.1 %
HCT VFR BLD AUTO: 40.4 %
HDLC SERPL-MCNC: 47 MG/DL
HDLC SERPL: 21 %
HGB BLD-MCNC: 13.6 G/DL
LDLC SERPL CALC-MCNC: 153.8 MG/DL
LYMPHOCYTES # BLD AUTO: 2.1 K/UL
LYMPHOCYTES NFR BLD: 30.7 %
MCH RBC QN AUTO: 29.8 PG
MCHC RBC AUTO-ENTMCNC: 33.7 G/DL
MCV RBC AUTO: 88 FL
MONOCYTES # BLD AUTO: 0.6 K/UL
MONOCYTES NFR BLD: 8.1 %
NEUTROPHILS # BLD AUTO: 4 K/UL
NEUTROPHILS NFR BLD: 59.6 %
NONHDLC SERPL-MCNC: 177 MG/DL
PLATELET # BLD AUTO: 225 K/UL
PMV BLD AUTO: 10.6 FL
POTASSIUM SERPL-SCNC: 4.6 MMOL/L
PROT SERPL-MCNC: 7.1 G/DL
RBC # BLD AUTO: 4.57 M/UL
SODIUM SERPL-SCNC: 142 MMOL/L
TRIGL SERPL-MCNC: 116 MG/DL
WBC # BLD AUTO: 6.78 K/UL

## 2017-11-30 PROCEDURE — 80061 LIPID PANEL: CPT

## 2017-11-30 PROCEDURE — 36415 COLL VENOUS BLD VENIPUNCTURE: CPT

## 2017-11-30 PROCEDURE — 85025 COMPLETE CBC W/AUTO DIFF WBC: CPT

## 2017-11-30 PROCEDURE — 83036 HEMOGLOBIN GLYCOSYLATED A1C: CPT

## 2017-11-30 PROCEDURE — 82306 VITAMIN D 25 HYDROXY: CPT

## 2017-11-30 PROCEDURE — 80076 HEPATIC FUNCTION PANEL: CPT

## 2017-11-30 PROCEDURE — 80048 BASIC METABOLIC PNL TOTAL CA: CPT

## 2017-12-08 ENCOUNTER — OFFICE VISIT (OUTPATIENT)
Dept: INTERNAL MEDICINE | Facility: CLINIC | Age: 72
End: 2017-12-08
Payer: MEDICARE

## 2017-12-08 ENCOUNTER — IMMUNIZATION (OUTPATIENT)
Dept: INTERNAL MEDICINE | Facility: CLINIC | Age: 72
End: 2017-12-08
Payer: MEDICARE

## 2017-12-08 VITALS
BODY MASS INDEX: 31.21 KG/M2 | HEIGHT: 63 IN | SYSTOLIC BLOOD PRESSURE: 120 MMHG | HEART RATE: 72 BPM | WEIGHT: 176.13 LBS | DIASTOLIC BLOOD PRESSURE: 68 MMHG

## 2017-12-08 DIAGNOSIS — R10.13 EPIGASTRIC PAIN: ICD-10-CM

## 2017-12-08 DIAGNOSIS — E11.9 TYPE 2 DIABETES MELLITUS WITHOUT COMPLICATION, WITHOUT LONG-TERM CURRENT USE OF INSULIN: Primary | ICD-10-CM

## 2017-12-08 DIAGNOSIS — K21.9 LPRD (LARYNGOPHARYNGEAL REFLUX DISEASE): ICD-10-CM

## 2017-12-08 DIAGNOSIS — K21.9 GASTROESOPHAGEAL REFLUX DISEASE, ESOPHAGITIS PRESENCE NOT SPECIFIED: ICD-10-CM

## 2017-12-08 DIAGNOSIS — E55.9 VITAMIN D DEFICIENCY: ICD-10-CM

## 2017-12-08 DIAGNOSIS — E78.5 HYPERLIPIDEMIA, UNSPECIFIED HYPERLIPIDEMIA TYPE: ICD-10-CM

## 2017-12-08 PROCEDURE — 99214 OFFICE O/P EST MOD 30 MIN: CPT | Mod: PBBFAC,25 | Performed by: INTERNAL MEDICINE

## 2017-12-08 PROCEDURE — 99999 PR PBB SHADOW E&M-EST. PATIENT-LVL IV: CPT | Mod: PBBFAC,,, | Performed by: INTERNAL MEDICINE

## 2017-12-08 PROCEDURE — 99214 OFFICE O/P EST MOD 30 MIN: CPT | Mod: S$PBB,,, | Performed by: INTERNAL MEDICINE

## 2017-12-08 PROCEDURE — G0008 ADMIN INFLUENZA VIRUS VAC: HCPCS | Mod: PBBFAC

## 2017-12-08 NOTE — PROGRESS NOTES
"Subjective:       Patient ID: Loni Heard is a 72 y.o. female.    Chief Complaint: Follow-up   HPI   this is a 72-year-old who presents today for follow-up she has been working on her diet eating a bit better more of a paleo  diet less carbs/sugar    that along with increasing her swimming seems to have helped her improve her blood sugars and diet she is also tolerating the Januvia and seems to be tolerating that area without difficulty.  She has worked with [facility] for her L [place] and feels that her current regimen of Protonix alone seems to be doing well she has not had any chronic cough type symptoms.  Lingering nasal congestion on occasion but otherwise doing well.  She still has issues on occasion with some discomfort in her epigastric region she had CT scan showed no definitive abnormalities she concerned about possible ulcer or other gi issue she feels symptoms improved a bit but not resolved  No associated nausea . She is following with her gynecologist has follow up mammogram scheudled this week for recheck       Review of Systems   Constitutional: Negative for fever.        Exercising swimming  more weight coming down    HENT:        Occaisonal nasal congestion    Respiratory: Negative for cough, shortness of breath and wheezing.    Cardiovascular: Negative for chest pain and palpitations.   Gastrointestinal: Positive for abdominal pain. Negative for constipation.        Refulx/lpr symptoms improved with ppi  Taking protonix now   intermittant abdominal/epigastric pain at times      Neurological: Negative for dizziness.       Objective:     Blood pressure 120/68, pulse 72, height 5' 3" (1.6 m), weight 79.9 kg (176 lb 2.4 oz).    Physical Exam   Constitutional: No distress.   HENT:   Head: Normocephalic.   Mouth/Throat: Oropharynx is clear and moist.   Eyes: No scleral icterus.   Neck: Neck supple.   Cardiovascular: Normal rate, regular rhythm and normal heart sounds.  Exam reveals no " gallop and no friction rub.    No murmur heard.  Pulmonary/Chest: Effort normal and breath sounds normal. No respiratory distress.   Abdominal: Soft. Bowel sounds are normal. She exhibits no mass. There is no tenderness.   No rebound   Or obvious hernia   Epigastric    Musculoskeletal: She exhibits no edema.   Neurological: She is alert.   Skin: No erythema.   Psychiatric: She has a normal mood and affect.   Vitals reviewed.      Assessment:       1. Type 2 diabetes mellitus without complication, without long-term current use of insulin    2. Hyperlipidemia, unspecified hyperlipidemia type    3. Gastroesophageal reflux disease, esophagitis presence not specified    4. Epigastric pain    5. Vitamin D deficiency    6. LPRD (laryngopharyngeal reflux disease)        Plan:       Loni was seen today for follow-up.    Diagnoses and all orders for this visit:    Type 2 diabetes mellitus without complication, without long-term current use of insulin most recent hemoglobin A1c improved  Down to 6.1 she is tolerating Januvia    -     Basic metabolic panel; Future  -     Hemoglobin A1c; Future  -     Hepatic function panel; Future  -     Lipid panel; Future    Hyperlipidemia, unspecified hyperlipidemia type  She is now on ezetimibe/zetia she she does not like the side effects of statins previously      Vitamin D deficiency still runs a bit low   Discussed [id number] daily   -     Vitamin D; Future    LPRD (laryngopharyngeal reflux disease)/GERD  Withy epigastric pain discussed   Pt doing well following with allergy and using protonix currently with good results     Flu shot recommended    Continue exercise healthy dietary measures  Continue medications     follw up 4 months with labs

## 2018-01-01 RX ORDER — SITAGLIPTIN 50 MG/1
TABLET, FILM COATED ORAL
Qty: 30 TABLET | Refills: 6 | Status: SHIPPED | OUTPATIENT
Start: 2018-01-01 | End: 2018-06-07 | Stop reason: SDUPTHER

## 2018-01-02 ENCOUNTER — TELEPHONE (OUTPATIENT)
Dept: INTERNAL MEDICINE | Facility: CLINIC | Age: 73
End: 2018-01-02

## 2018-02-20 DIAGNOSIS — K21.9 LARYNGOPHARYNGEAL REFLUX (LPR): ICD-10-CM

## 2018-02-20 RX ORDER — PANTOPRAZOLE SODIUM 40 MG/1
TABLET, DELAYED RELEASE ORAL
Qty: 30 TABLET | Refills: 0 | Status: SHIPPED | OUTPATIENT
Start: 2018-02-20 | End: 2018-04-16 | Stop reason: SDUPTHER

## 2018-04-03 RX ORDER — EZETIMIBE 10 MG/1
TABLET ORAL
Qty: 30 TABLET | Refills: 6 | Status: SHIPPED | OUTPATIENT
Start: 2018-04-03 | End: 2018-12-08 | Stop reason: SDUPTHER

## 2018-04-06 ENCOUNTER — LAB VISIT (OUTPATIENT)
Dept: LAB | Facility: HOSPITAL | Age: 73
End: 2018-04-06
Attending: INTERNAL MEDICINE
Payer: MEDICARE

## 2018-04-06 DIAGNOSIS — E55.9 VITAMIN D DEFICIENCY: ICD-10-CM

## 2018-04-06 DIAGNOSIS — E11.9 TYPE 2 DIABETES MELLITUS WITHOUT COMPLICATION, WITHOUT LONG-TERM CURRENT USE OF INSULIN: ICD-10-CM

## 2018-04-06 LAB
25(OH)D3+25(OH)D2 SERPL-MCNC: 26 NG/ML
ALBUMIN SERPL BCP-MCNC: 4 G/DL
ALP SERPL-CCNC: 71 U/L
ALT SERPL W/O P-5'-P-CCNC: 20 U/L
ANION GAP SERPL CALC-SCNC: 8 MMOL/L
AST SERPL-CCNC: 18 U/L
BILIRUB DIRECT SERPL-MCNC: 0.2 MG/DL
BILIRUB SERPL-MCNC: 0.5 MG/DL
BUN SERPL-MCNC: 17 MG/DL
CALCIUM SERPL-MCNC: 9.8 MG/DL
CHLORIDE SERPL-SCNC: 104 MMOL/L
CHOLEST SERPL-MCNC: 223 MG/DL
CHOLEST/HDLC SERPL: 4.3 {RATIO}
CO2 SERPL-SCNC: 28 MMOL/L
CREAT SERPL-MCNC: 0.9 MG/DL
EST. GFR  (AFRICAN AMERICAN): >60 ML/MIN/1.73 M^2
EST. GFR  (NON AFRICAN AMERICAN): >60 ML/MIN/1.73 M^2
ESTIMATED AVG GLUCOSE: 128 MG/DL
GLUCOSE SERPL-MCNC: 140 MG/DL
HBA1C MFR BLD HPLC: 6.1 %
HDLC SERPL-MCNC: 52 MG/DL
HDLC SERPL: 23.3 %
LDLC SERPL CALC-MCNC: 143.2 MG/DL
NONHDLC SERPL-MCNC: 171 MG/DL
POTASSIUM SERPL-SCNC: 5 MMOL/L
PROT SERPL-MCNC: 6.8 G/DL
SODIUM SERPL-SCNC: 140 MMOL/L
TRIGL SERPL-MCNC: 139 MG/DL

## 2018-04-06 PROCEDURE — 80076 HEPATIC FUNCTION PANEL: CPT

## 2018-04-06 PROCEDURE — 80061 LIPID PANEL: CPT

## 2018-04-06 PROCEDURE — 80048 BASIC METABOLIC PNL TOTAL CA: CPT

## 2018-04-06 PROCEDURE — 82306 VITAMIN D 25 HYDROXY: CPT

## 2018-04-06 PROCEDURE — 83036 HEMOGLOBIN GLYCOSYLATED A1C: CPT

## 2018-04-06 PROCEDURE — 36415 COLL VENOUS BLD VENIPUNCTURE: CPT

## 2018-04-09 ENCOUNTER — OFFICE VISIT (OUTPATIENT)
Dept: INTERNAL MEDICINE | Facility: CLINIC | Age: 73
End: 2018-04-09
Payer: MEDICARE

## 2018-04-09 VITALS
WEIGHT: 173.94 LBS | BODY MASS INDEX: 29.7 KG/M2 | SYSTOLIC BLOOD PRESSURE: 120 MMHG | DIASTOLIC BLOOD PRESSURE: 65 MMHG | HEIGHT: 64 IN | HEART RATE: 68 BPM

## 2018-04-09 DIAGNOSIS — K21.9 LPRD (LARYNGOPHARYNGEAL REFLUX DISEASE): ICD-10-CM

## 2018-04-09 DIAGNOSIS — E78.49 OTHER HYPERLIPIDEMIA: ICD-10-CM

## 2018-04-09 DIAGNOSIS — E55.9 VITAMIN D DEFICIENCY: ICD-10-CM

## 2018-04-09 DIAGNOSIS — E11.9 TYPE 2 DIABETES MELLITUS WITHOUT COMPLICATION, WITHOUT LONG-TERM CURRENT USE OF INSULIN: Primary | ICD-10-CM

## 2018-04-09 PROCEDURE — 99214 OFFICE O/P EST MOD 30 MIN: CPT | Mod: PBBFAC | Performed by: INTERNAL MEDICINE

## 2018-04-09 PROCEDURE — 99214 OFFICE O/P EST MOD 30 MIN: CPT | Mod: S$PBB,,, | Performed by: INTERNAL MEDICINE

## 2018-04-09 PROCEDURE — 99999 PR PBB SHADOW E&M-EST. PATIENT-LVL IV: CPT | Mod: PBBFAC,,, | Performed by: INTERNAL MEDICINE

## 2018-04-09 RX ORDER — ERGOCALCIFEROL 1.25 MG/1
CAPSULE ORAL
Qty: 12 CAPSULE | Refills: 0 | Status: SHIPPED | OUTPATIENT
Start: 2018-04-09 | End: 2018-04-10 | Stop reason: SDUPTHER

## 2018-04-09 RX ORDER — ERGOCALCIFEROL 1.25 MG/1
50000 CAPSULE ORAL WEEKLY
Qty: 12 CAPSULE | Refills: 0 | Status: SHIPPED | OUTPATIENT
Start: 2018-04-09 | End: 2018-04-09 | Stop reason: SDUPTHER

## 2018-04-09 NOTE — PROGRESS NOTES
Answers for HPI/ROS submitted by the patient on 4/4/2018   activity change: No  unexpected weight change: No  neck pain: No  hearing loss: No  rhinorrhea: No  trouble swallowing: No  eye discharge: No  visual disturbance: No  chest tightness: No  wheezing: No  chest pain: No  palpitations: No  blood in stool: No  constipation: No  vomiting: No  diarrhea: No  polydipsia: No  polyuria: No  difficulty urinating: No  hematuria: No  menstrual problem: No  dysuria: No  joint swelling: No  arthralgias: No  headaches: No  weakness: No  confusion: No  dysphoric mood: No

## 2018-04-09 NOTE — PROGRESS NOTES
Subjective:       Patient ID: Loni Heard is a 72 y.o. female.    Chief Complaint: Follow-up   this is a 72-year-old who presents today for follow-up she has been taking Januvia without difficulty and blood sugars overall seem improved.  She continues to stay active swimming a bit less since she's been caring for her roommate who had a recent injury.  She has been doing some activity and plans to get back to swimming a bit more.  She remains on vitamin D has been taking about 1000 units a day but vitamin D remains low.  She seems to tolerate Januvia without difficulty but was concerned about that he and how it may contribute to yeast infection she has a GYN appointment in the knee near future denies discharge but gets occasional irritation.  She is not interested in taking statins due to her concerns and some difficulty with toleration in the past.  She does still occasionally get discomfort on the left side she had a CT scan which she chose to do without contrast no hernias were noted .she feels the discomfort more when she pushes on it or moves in a certain direction tends to come and go but not severe.  She has been taking some additional supplementals including lysine magnesium CoQ10 and vitamin C she was also taking k2     HPI  Review of Systems   Constitutional: Negative for activity change and unexpected weight change.   HENT: Negative for hearing loss, rhinorrhea and trouble swallowing.    Eyes: Negative for discharge and visual disturbance.   Respiratory: Negative for chest tightness and wheezing.    Cardiovascular: Negative for chest pain and palpitations.   Gastrointestinal: Negative for blood in stool, constipation, diarrhea and vomiting.   Endocrine: Negative for polydipsia and polyuria.   Genitourinary: Negative for difficulty urinating, dysuria, hematuria and menstrual problem.   Musculoskeletal: Negative for arthralgias, joint swelling and neck pain.   Neurological: Negative for weakness  "and headaches.   Psychiatric/Behavioral: Negative for confusion and dysphoric mood.       Objective:     Blood pressure 120/65, pulse 68, height 5' 4" (1.626 m), weight 78.9 kg (173 lb 15.1 oz).    Physical Exam   Constitutional: No distress.   HENT:   Head: Normocephalic.   Mouth/Throat: Oropharynx is clear and moist.   Eyes: No scleral icterus.   Neck: Neck supple.   Cardiovascular: Normal rate, regular rhythm and normal heart sounds.  Exam reveals no gallop and no friction rub.    No murmur heard.  Pulmonary/Chest: Effort normal and breath sounds normal. No respiratory distress.   Abdominal: Soft. Bowel sounds are normal. She exhibits no mass. There is no tenderness.   Musculoskeletal: She exhibits no edema.   Neurological: She is alert.   Skin: No erythema.   Psychiatric: She has a normal mood and affect.   Vitals reviewed.      Assessment:       1. Type 2 diabetes mellitus without complication, without long-term current use of insulin    2. Other hyperlipidemia    3. Vitamin D deficiency    4. LPRD (laryngopharyngeal reflux disease)        Plan:       Loni was seen today for follow-up.    Diagnoses and all orders for this visit:    Type 2 diabetes mellitus without complication, without long-term current use of insulin  -     Basic metabolic panel; Future  -     CBC auto differential; Future  -     Hemoglobin A1c; Future  -     Hepatic function panel; Future  -     Lipid panel; Future  -     TSH; Future    Other hyperlipidemia  She is not interested in statins due to her concerns her Zetia seems to have improved her lipids to some degree and we discussed risks benefits she will plan on considering continuing medication at this time     Vitamin D deficiency    LPRD (laryngopharyngeal reflux disease)  She was placed on Protonix and seems to be tolerating without difficulty  Cough improved     Vit d resume ergocalciferol (ERGOCALCIFEROL) 50,000 unit Cap; Take 1 capsule (50,000 Units total) by mouth once a " week. One tablet weekly x 12 weeks  Then resume 2000 units discussed     intermittant side discomfort improved over time  Discussed re-imaging if persist   She will be due for her chest ct follow up in august for surveillance     She continues to follow with her gyn for her paps and mammogram     Follow-up 4 months sooner if concern

## 2018-04-10 RX ORDER — ERGOCALCIFEROL 1.25 MG/1
CAPSULE ORAL
Qty: 12 CAPSULE | Refills: 0 | Status: SHIPPED | OUTPATIENT
Start: 2018-04-10 | End: 2018-08-10

## 2018-04-16 DIAGNOSIS — K21.9 LARYNGOPHARYNGEAL REFLUX (LPR): ICD-10-CM

## 2018-04-17 RX ORDER — PANTOPRAZOLE SODIUM 40 MG/1
TABLET, DELAYED RELEASE ORAL
Qty: 30 TABLET | Refills: 0 | Status: SHIPPED | OUTPATIENT
Start: 2018-04-17 | End: 2018-06-13 | Stop reason: SDUPTHER

## 2018-05-07 ENCOUNTER — PATIENT MESSAGE (OUTPATIENT)
Dept: INTERNAL MEDICINE | Facility: CLINIC | Age: 73
End: 2018-05-07

## 2018-06-13 DIAGNOSIS — K21.9 LARYNGOPHARYNGEAL REFLUX (LPR): ICD-10-CM

## 2018-06-14 ENCOUNTER — PATIENT MESSAGE (OUTPATIENT)
Dept: INTERNAL MEDICINE | Facility: CLINIC | Age: 73
End: 2018-06-14

## 2018-06-14 RX ORDER — PANTOPRAZOLE SODIUM 40 MG/1
TABLET, DELAYED RELEASE ORAL
Qty: 30 TABLET | Refills: 0 | Status: SHIPPED | OUTPATIENT
Start: 2018-06-14 | End: 2018-07-23 | Stop reason: SDUPTHER

## 2018-06-15 NOTE — TELEPHONE ENCOUNTER
Called and spoke with pt   She is following with her outyllying gyn  Dr. Hargrove and consutlation with breast oncology surgeon planned next week

## 2018-06-25 ENCOUNTER — TELEPHONE (OUTPATIENT)
Dept: INTERNAL MEDICINE | Facility: CLINIC | Age: 73
End: 2018-06-25

## 2018-06-25 PROBLEM — C50.912 BREAST CANCER, LEFT BREAST: Status: ACTIVE | Noted: 2018-06-25

## 2018-06-25 NOTE — TELEPHONE ENCOUNTER
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

## 2018-07-23 DIAGNOSIS — K21.9 LARYNGOPHARYNGEAL REFLUX (LPR): ICD-10-CM

## 2018-07-25 RX ORDER — PANTOPRAZOLE SODIUM 40 MG/1
TABLET, DELAYED RELEASE ORAL
Qty: 30 TABLET | Refills: 0 | Status: SHIPPED | OUTPATIENT
Start: 2018-07-25 | End: 2018-07-30 | Stop reason: SDUPTHER

## 2018-07-30 DIAGNOSIS — K21.9 LARYNGOPHARYNGEAL REFLUX (LPR): ICD-10-CM

## 2018-07-30 NOTE — TELEPHONE ENCOUNTER
Received a fax from [facility] regarding Pantoprazole.    Patient is requesting 901 days instead of 30.

## 2018-08-02 RX ORDER — PANTOPRAZOLE SODIUM 40 MG/1
TABLET, DELAYED RELEASE ORAL
Qty: 90 TABLET | Refills: 0 | Status: SHIPPED | OUTPATIENT
Start: 2018-08-02 | End: 2019-02-04

## 2018-08-06 ENCOUNTER — LAB VISIT (OUTPATIENT)
Dept: LAB | Facility: HOSPITAL | Age: 73
End: 2018-08-06
Attending: INTERNAL MEDICINE
Payer: MEDICARE

## 2018-08-06 DIAGNOSIS — E11.9 TYPE 2 DIABETES MELLITUS WITHOUT COMPLICATION, WITHOUT LONG-TERM CURRENT USE OF INSULIN: ICD-10-CM

## 2018-08-06 LAB
ALBUMIN SERPL BCP-MCNC: 4.1 G/DL
ALP SERPL-CCNC: 66 U/L
ALT SERPL W/O P-5'-P-CCNC: 22 U/L
ANION GAP SERPL CALC-SCNC: 7 MMOL/L
AST SERPL-CCNC: 21 U/L
BASOPHILS # BLD AUTO: 0.01 K/UL
BASOPHILS NFR BLD: 0.2 %
BILIRUB DIRECT SERPL-MCNC: 0.2 MG/DL
BILIRUB SERPL-MCNC: 0.5 MG/DL
BUN SERPL-MCNC: 15 MG/DL
CALCIUM SERPL-MCNC: 9.7 MG/DL
CHLORIDE SERPL-SCNC: 106 MMOL/L
CHOLEST SERPL-MCNC: 222 MG/DL
CHOLEST/HDLC SERPL: 4 {RATIO}
CO2 SERPL-SCNC: 27 MMOL/L
CREAT SERPL-MCNC: 0.9 MG/DL
DIFFERENTIAL METHOD: NORMAL
EOSINOPHIL # BLD AUTO: 0.1 K/UL
EOSINOPHIL NFR BLD: 1.2 %
ERYTHROCYTE [DISTWIDTH] IN BLOOD BY AUTOMATED COUNT: 12.3 %
EST. GFR  (AFRICAN AMERICAN): >60 ML/MIN/1.73 M^2
EST. GFR  (NON AFRICAN AMERICAN): >60 ML/MIN/1.73 M^2
ESTIMATED AVG GLUCOSE: 140 MG/DL
GLUCOSE SERPL-MCNC: 135 MG/DL
HBA1C MFR BLD HPLC: 6.5 %
HCT VFR BLD AUTO: 40.5 %
HDLC SERPL-MCNC: 56 MG/DL
HDLC SERPL: 25.2 %
HGB BLD-MCNC: 13.3 G/DL
LDLC SERPL CALC-MCNC: 144.8 MG/DL
LYMPHOCYTES # BLD AUTO: 2.2 K/UL
LYMPHOCYTES NFR BLD: 39.3 %
MCH RBC QN AUTO: 29.1 PG
MCHC RBC AUTO-ENTMCNC: 32.8 G/DL
MCV RBC AUTO: 89 FL
MONOCYTES # BLD AUTO: 0.6 K/UL
MONOCYTES NFR BLD: 9.7 %
NEUTROPHILS # BLD AUTO: 2.8 K/UL
NEUTROPHILS NFR BLD: 49.4 %
NONHDLC SERPL-MCNC: 166 MG/DL
PLATELET # BLD AUTO: 220 K/UL
PMV BLD AUTO: 10.5 FL
POTASSIUM SERPL-SCNC: 4.9 MMOL/L
PROT SERPL-MCNC: 7 G/DL
RBC # BLD AUTO: 4.57 M/UL
SODIUM SERPL-SCNC: 140 MMOL/L
TRIGL SERPL-MCNC: 106 MG/DL
TSH SERPL DL<=0.005 MIU/L-ACNC: 2.6 UIU/ML
WBC # BLD AUTO: 5.67 K/UL

## 2018-08-06 PROCEDURE — 85025 COMPLETE CBC W/AUTO DIFF WBC: CPT

## 2018-08-06 PROCEDURE — 84443 ASSAY THYROID STIM HORMONE: CPT

## 2018-08-06 PROCEDURE — 80076 HEPATIC FUNCTION PANEL: CPT

## 2018-08-06 PROCEDURE — 36415 COLL VENOUS BLD VENIPUNCTURE: CPT

## 2018-08-06 PROCEDURE — 83036 HEMOGLOBIN GLYCOSYLATED A1C: CPT

## 2018-08-06 PROCEDURE — 80048 BASIC METABOLIC PNL TOTAL CA: CPT

## 2018-08-06 PROCEDURE — 80061 LIPID PANEL: CPT

## 2018-08-10 ENCOUNTER — OFFICE VISIT (OUTPATIENT)
Dept: INTERNAL MEDICINE | Facility: CLINIC | Age: 73
End: 2018-08-10
Payer: MEDICARE

## 2018-08-10 VITALS
BODY MASS INDEX: 30.83 KG/M2 | HEART RATE: 67 BPM | DIASTOLIC BLOOD PRESSURE: 68 MMHG | HEIGHT: 64 IN | SYSTOLIC BLOOD PRESSURE: 128 MMHG | WEIGHT: 180.56 LBS

## 2018-08-10 DIAGNOSIS — E55.9 VITAMIN D DEFICIENCY: ICD-10-CM

## 2018-08-10 DIAGNOSIS — E78.49 OTHER HYPERLIPIDEMIA: ICD-10-CM

## 2018-08-10 DIAGNOSIS — Z78.0 POSTMENOPAUSAL: ICD-10-CM

## 2018-08-10 DIAGNOSIS — C50.912 MALIGNANT NEOPLASM OF LEFT FEMALE BREAST, UNSPECIFIED ESTROGEN RECEPTOR STATUS, UNSPECIFIED SITE OF BREAST: ICD-10-CM

## 2018-08-10 DIAGNOSIS — E55.9 MILD VITAMIN D DEFICIENCY: ICD-10-CM

## 2018-08-10 DIAGNOSIS — E11.9 TYPE 2 DIABETES MELLITUS WITHOUT COMPLICATION, WITHOUT LONG-TERM CURRENT USE OF INSULIN: Primary | ICD-10-CM

## 2018-08-10 DIAGNOSIS — R91.8 PULMONARY NODULES: ICD-10-CM

## 2018-08-10 DIAGNOSIS — K21.9 LPRD (LARYNGOPHARYNGEAL REFLUX DISEASE): ICD-10-CM

## 2018-08-10 PROCEDURE — 99213 OFFICE O/P EST LOW 20 MIN: CPT | Mod: PBBFAC | Performed by: INTERNAL MEDICINE

## 2018-08-10 PROCEDURE — 99999 PR PBB SHADOW E&M-EST. PATIENT-LVL III: CPT | Mod: PBBFAC,,, | Performed by: INTERNAL MEDICINE

## 2018-08-10 PROCEDURE — 99214 OFFICE O/P EST MOD 30 MIN: CPT | Mod: S$PBB,,, | Performed by: INTERNAL MEDICINE

## 2018-08-10 NOTE — PROGRESS NOTES
Answers for HPI/ROS submitted by the patient on 8/8/2018   Abdominal pain  Chronicity: chronic  Onset: more than 1 year ago  Onset quality: gradual  Frequency: daily  Episode duration: 8 hours  Pain location: LUQ  Pain - numeric: 4/10  Pain quality: burning  anorexia: No  arthralgias: No  belching: No  constipation: No  diarrhea: No  dysuria: No  fever: No  flatus: No  frequency: No  headaches: No  hematochezia: No  hematuria: No  melena: No  myalgias: No  nausea: No  weight loss: No  vomiting: No  Aggravated by: movement  Relieved by: recumbency  Diagnostic workup: CT scan  Pain severity: moderate  abdominal surgery: No  colon cancer: No  Crohn's disease: No  gallstones: No  GERD: Yes  irritable bowel syndrome: No  kidney stones: No  pancreatitis: No  PUD: No  ulcerative colitis: No  UTI: No

## 2018-08-10 NOTE — PROGRESS NOTES
Subjective:       Patient ID: Loni Heard is a 72 y.o. female.    Chief Complaint: Follow-up   This is a 72-year-old who presents today for follow-up.  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 hidalgo is  She had left breast lumpectomy and nodes removed reports was pleased to find the nodes were negative she has also been referred to see a radiation oncologist and she has an appointment in the near future for evaluation for possible treatment options if needed.  Patient reports she will also be seeing hematologist oncologist after and likely be placed additional medication.  She denies reflux reports that she has cut out her as peppermint which she was taking a lot of and that seemed to help the symptoms she was having trouble with coughing more LP are she went for.  When she tried off the medicine and did well so wants to consider getting off of it completely since she worries about the long-term use.  She also has occasional discomfort in her abdomen a small area which she feels of fatty tissue that has been imaged before without any abnormalities and she denies hernia but sometimes the pain is more acute she denies pain currently   She is continues to take her medicine and her blood sugars have been well controlled she is now finished her vitamin-D and is taking over-the-counter vitamin-D to help maintain her levels  She worries about her bones health as well and she is due for bone density.  During this time frame she has not been exercising like she was previously because she did a lot of swimming and was unable to do so with surgery she hopes to do so in the future.  Feels her blood sugar has been good.     HPI  Review of Systems   Constitutional:  "Negative for fever.   Gastrointestinal: Positive for abdominal pain. Negative for constipation, diarrhea, nausea and vomiting.   Genitourinary: Negative for dysuria, frequency and hematuria.   Musculoskeletal: Negative for arthralgias and myalgias.   Neurological: Negative for headaches.       Objective:     Blood pressure 128/68, pulse 67, height 5' 4" (1.626 m), weight 81.9 kg (180 lb 8.9 oz).    Physical Exam   Constitutional: No distress.   HENT:   Head: Normocephalic.   Mouth/Throat: Oropharynx is clear and moist.   Eyes: No scleral icterus.   Neck: Neck supple.   Cardiovascular: Normal rate, regular rhythm and normal heart sounds.  Exam reveals no gallop and no friction rub.    No murmur heard.  Pulses:       Dorsalis pedis pulses are 1+ on the right side, and 1+ on the left side.        Posterior tibial pulses are 1+ on the right side, and 1+ on the left side.   Pulmonary/Chest: Effort normal and breath sounds normal. No respiratory distress.   Left breast   Surgical scar healing  Breast and left axilla    Abdominal: Soft. Bowel sounds are normal. She exhibits no mass. There is no tenderness.   Musculoskeletal: She exhibits no edema.   Feet:   Right Foot:   Protective Sensation: 7 sites tested. 7 sites sensed.   Skin Integrity: Negative for skin breakdown or callus.   Left Foot:   Protective Sensation: 7 sites tested. 7 sites sensed.   Skin Integrity: Positive for callus. Negative for skin breakdown.   Neurological: She is alert.   Skin: No erythema.   Mobile fatty tissue left   Psychiatric: She has a normal mood and affect.   Vitals reviewed.      Assessment:       1. Type 2 diabetes mellitus without complication, without long-term current use of insulin    2. Postmenopausal    3. Vitamin D deficiency    4. Pulmonary nodules    5. Malignant neoplasm of left female breast, unspecified estrogen receptor status, unspecified site of breast    6. Other hyperlipidemia    7. Mild vitamin D deficiency    8. LPRD " (laryngopharyngeal reflux disease)        Plan:       Loni was seen today for follow-up.    Diagnoses and all orders for this visit:    Type 2 diabetes mellitus without complication, without long-term current use of insulin  Blood sugars have been doing well continue current regimen resume exercise when she is able  -     Microalbumin/creatinine urine ratio; Future  -     Basic metabolic panel; Future  -     Hemoglobin A1c; Future  -     Hepatic function panel; Future  -     Lipid panel; Future  -     Vitamin D; Future    Postmenopausal  Update her bone density at  -     DXA Bone Density Spine And Hip; Future      Pulmonary nodules her convenience  History of seen on prior CT discussed repeat  -     CT Chest Without Contrast; Future    Malignant neoplasm of left female breast, history of she is following with her outlying breast specialist including a breast surgeon radiation oncology appointment coming up for further evaluation then Hematology Oncology appointment as well she will keep her appointments as planned    Other hyperlipidemia   She was not interested in staying on statins has been well with Zetia is will continue    Mild vitamin D deficiency  History of she is taking vitamin D over-the-counter 2000 units  Recheck with next labs  Update bone density discussed     lpr /gerd: she has worked on improved diet cut out who minutes which she was drinking a lot of mint tea and feels her symptoms have improved she reports she did hold her proton pump inhibitor at one  point with no recurrence so she may consider getting off of it to see how she does again     Resume exercise when she is able     Follow-up 4 months sooner concerns

## 2018-08-15 ENCOUNTER — HOSPITAL ENCOUNTER (OUTPATIENT)
Dept: RADIOLOGY | Facility: HOSPITAL | Age: 73
Discharge: HOME | End: 2018-08-15
Attending: INTERNAL MEDICINE
Payer: MEDICARE

## 2018-08-15 ENCOUNTER — HOSPITAL ENCOUNTER (OUTPATIENT)
Dept: RADIOLOGY | Facility: CLINIC | Age: 73
Discharge: HOME | End: 2018-08-15
Attending: INTERNAL MEDICINE
Payer: MEDICARE

## 2018-08-15 DIAGNOSIS — Z78.0 POSTMENOPAUSAL: ICD-10-CM

## 2018-08-15 DIAGNOSIS — R91.8 PULMONARY NODULES: ICD-10-CM

## 2018-08-15 PROBLEM — M81.0 OSTEOPOROSIS: Status: ACTIVE | Noted: 2018-08-15

## 2018-08-15 PROCEDURE — 71250 CT THORAX DX C-: CPT | Mod: 26,,, | Performed by: RADIOLOGY

## 2018-08-15 PROCEDURE — 71250 CT THORAX DX C-: CPT | Mod: TC

## 2018-08-15 PROCEDURE — 77080 DXA BONE DENSITY AXIAL: CPT | Mod: 26,,, | Performed by: INTERNAL MEDICINE

## 2018-08-15 PROCEDURE — 77080 DXA BONE DENSITY AXIAL: CPT | Mod: TC

## 2018-09-02 RX ORDER — SITAGLIPTIN 50 MG/1
TABLET, FILM COATED ORAL
Qty: 30 TABLET | Refills: 6 | Status: SHIPPED | OUTPATIENT
Start: 2018-09-02 | End: 2019-01-16 | Stop reason: SDUPTHER

## 2018-09-20 ENCOUNTER — PATIENT MESSAGE (OUTPATIENT)
Dept: INTERNAL MEDICINE | Facility: CLINIC | Age: 73
End: 2018-09-20

## 2018-09-25 ENCOUNTER — PATIENT MESSAGE (OUTPATIENT)
Dept: INTERNAL MEDICINE | Facility: CLINIC | Age: 73
End: 2018-09-25

## 2018-09-28 ENCOUNTER — OFFICE VISIT (OUTPATIENT)
Dept: INTERNAL MEDICINE | Facility: CLINIC | Age: 73
End: 2018-09-28
Payer: MEDICARE

## 2018-09-28 VITALS
BODY MASS INDEX: 31.02 KG/M2 | DIASTOLIC BLOOD PRESSURE: 68 MMHG | HEART RATE: 74 BPM | TEMPERATURE: 98 F | SYSTOLIC BLOOD PRESSURE: 120 MMHG | WEIGHT: 181.69 LBS | HEIGHT: 64 IN

## 2018-09-28 DIAGNOSIS — R42 VERTIGO: Primary | ICD-10-CM

## 2018-09-28 DIAGNOSIS — C50.912 MALIGNANT NEOPLASM OF LEFT FEMALE BREAST, UNSPECIFIED ESTROGEN RECEPTOR STATUS, UNSPECIFIED SITE OF BREAST: ICD-10-CM

## 2018-09-28 PROCEDURE — 99213 OFFICE O/P EST LOW 20 MIN: CPT | Mod: PBBFAC | Performed by: INTERNAL MEDICINE

## 2018-09-28 PROCEDURE — 99213 OFFICE O/P EST LOW 20 MIN: CPT | Mod: S$PBB,,, | Performed by: INTERNAL MEDICINE

## 2018-09-28 PROCEDURE — 99999 PR PBB SHADOW E&M-EST. PATIENT-LVL III: CPT | Mod: PBBFAC,,, | Performed by: INTERNAL MEDICINE

## 2018-09-28 NOTE — PROGRESS NOTES
"Subjective:       Patient ID: Loni Heard is a 73 y.o. female.    Chief Complaint: Follow-up (bp check)   This is a 73-year-old who presents today for follow-up she wanted to have her blood pressure checked reports that she has been having some stress with her treatments recently has been getting radiation treatments at [facility] with her radiation oncologist she has 8 more treatments and sees her on radiation oncologist regularly.  She also will be following with a hematologist oncologist after treatment for starting oral agents for prevention.  She has had some redness and some skin irritation at the site of her radiation.  She has had no fevers or chills.  His patient reports that she has had a few elevated blood pressure reading when she has gone for treatments and was not sure if it was related arm she was going to need medications she has been monitoring her home blood pressure it has been variable but usually it is pretty good.  She reports that her weight is trending up a little bit because she is not exercising as much as she was although she has been doing some swimming.  She occasionally has fluid in her ears but no pain  She does not feel like she is going to pass out but she does just feel a bit of dizziness when she changes positions suddenly or when she is lying down for her radiation treatments and gets up.  She denies any other associated neurologic symptoms    HPI  Review of Systems   Constitutional: Positive for fatigue. Negative for fever.   Respiratory: Negative for cough and shortness of breath.    Cardiovascular: Negative for chest pain and palpitations.   Genitourinary:        Radiaton changes left breast    Musculoskeletal:        Arthrtis knee    Neurological:        Vertigo        Objective:     Blood pressure 120/68, pulse 74, temperature 98 °F (36.7 °C), height 5' 4" (1.626 m), weight 82.4 kg (181 lb 10.5 oz).    Physical Exam   Constitutional: No distress.   HENT:   Head: " Normocephalic.   Mouth/Throat: Oropharynx is clear and moist.   Eyes: No scleral icterus.   Neck: Neck supple.   Cardiovascular: Normal rate, regular rhythm and normal heart sounds. Exam reveals no gallop and no friction rub.   No murmur heard.  Pulmonary/Chest: Effort normal and breath sounds normal. No respiratory distress.   Healing surgical scar  Localized erythema  Radiation changes    Abdominal: Soft. Bowel sounds are normal. She exhibits no mass. There is no tenderness.   Musculoskeletal: She exhibits no edema.   Neurological: She is alert.   Skin: No erythema.   Psychiatric: She has a normal mood and affect.   Vitals reviewed.      Assessment:       1. Vertigo    2. Malignant neoplasm of left female breast, unspecified estrogen receptor status, unspecified site of breast        Plan:       Loni was seen today for follow-up.    Diagnoses and all orders for this visit:    Vertigo  History of we discussed eustachian dysfunction trial back of Flonase try to keep her ears dry there is no sign of infection but she will call if she develops ear pain or increasing discomfort    Malignant neoplasm of left female breast, unspecified estrogen receptor status, unspecified site of breast  Patient is undergoing treatment with her radiation oncologist and her oncologist at [facility]  She has some localized redness and will follow up with them as planned on Monday for recheck no fevers chils.    History of mildly elevated blood pressure readings with recent treatments may be related to the weight gain situational stress and pain associated with her treatments blood pressure today is acceptable so for now will have her work on exercising when she is able home monitoring and when she returns for her regular checkup she will bring her home blood pressure cuff readings but call with any symptoms of concern     We discussed labs including EKG and blood work she declines at this time but if symptoms persist we will  consider    Vertigo precautions and fall precautions reviewed including situational changes more slowly specially with her radiation treatments as this is when she is having her symptoms mostly discussed medication such as antivert she would like to hold off

## 2018-10-16 ENCOUNTER — PATIENT MESSAGE (OUTPATIENT)
Dept: INTERNAL MEDICINE | Facility: CLINIC | Age: 73
End: 2018-10-16

## 2018-11-07 ENCOUNTER — PATIENT MESSAGE (OUTPATIENT)
Dept: INTERNAL MEDICINE | Facility: CLINIC | Age: 73
End: 2018-11-07

## 2018-11-12 ENCOUNTER — PATIENT MESSAGE (OUTPATIENT)
Dept: INTERNAL MEDICINE | Facility: CLINIC | Age: 73
End: 2018-11-12

## 2018-11-12 DIAGNOSIS — K21.9 LPRD (LARYNGOPHARYNGEAL REFLUX DISEASE): Primary | ICD-10-CM

## 2018-11-12 DIAGNOSIS — R10.9 ABDOMINAL PAIN, UNSPECIFIED ABDOMINAL LOCATION: ICD-10-CM

## 2018-11-14 NOTE — TELEPHONE ENCOUNTER
Called and spoke with pt regarding  Her intermittant abdoinal pain   She reports has referral from [facility]  As well may consider [facility]   appt will have them send reports go from  There discussed her concerns with her  Hem/onc medicatoins . She is asymptomatic today

## 2018-11-27 ENCOUNTER — HOSPITAL ENCOUNTER (OUTPATIENT)
Dept: RADIOLOGY | Facility: HOSPITAL | Age: 73
Discharge: HOME | End: 2018-11-27
Attending: ORTHOPAEDIC SURGERY
Payer: MEDICARE

## 2018-11-27 DIAGNOSIS — M25.561 PAIN IN RIGHT KNEE: ICD-10-CM

## 2018-11-27 PROCEDURE — 73721 MRI JNT OF LWR EXTRE W/O DYE: CPT | Mod: TC,RT

## 2018-11-27 PROCEDURE — 73721 MRI JNT OF LWR EXTRE W/O DYE: CPT | Mod: 26,RT,, | Performed by: RADIOLOGY

## 2018-12-04 ENCOUNTER — LAB VISIT (OUTPATIENT)
Dept: LAB | Facility: HOSPITAL | Age: 73
End: 2018-12-04
Attending: INTERNAL MEDICINE
Payer: MEDICARE

## 2018-12-04 ENCOUNTER — PATIENT MESSAGE (OUTPATIENT)
Dept: INTERNAL MEDICINE | Facility: CLINIC | Age: 73
End: 2018-12-04

## 2018-12-04 DIAGNOSIS — E55.9 VITAMIN D DEFICIENCY: ICD-10-CM

## 2018-12-04 DIAGNOSIS — E11.9 TYPE 2 DIABETES MELLITUS WITHOUT COMPLICATION, WITHOUT LONG-TERM CURRENT USE OF INSULIN: ICD-10-CM

## 2018-12-04 LAB
25(OH)D3+25(OH)D2 SERPL-MCNC: 28 NG/ML
ALBUMIN SERPL BCP-MCNC: 4.1 G/DL
ALP SERPL-CCNC: 64 U/L
ALT SERPL W/O P-5'-P-CCNC: 25 U/L
ANION GAP SERPL CALC-SCNC: 7 MMOL/L
AST SERPL-CCNC: 21 U/L
BILIRUB DIRECT SERPL-MCNC: 0.2 MG/DL
BILIRUB SERPL-MCNC: 0.5 MG/DL
BUN SERPL-MCNC: 19 MG/DL
CALCIUM SERPL-MCNC: 10.1 MG/DL
CHLORIDE SERPL-SCNC: 105 MMOL/L
CHOLEST SERPL-MCNC: 231 MG/DL
CHOLEST/HDLC SERPL: 4.1 {RATIO}
CO2 SERPL-SCNC: 29 MMOL/L
CREAT SERPL-MCNC: 0.9 MG/DL
EST. GFR  (AFRICAN AMERICAN): >60 ML/MIN/1.73 M^2
EST. GFR  (NON AFRICAN AMERICAN): >60 ML/MIN/1.73 M^2
ESTIMATED AVG GLUCOSE: 140 MG/DL
GLUCOSE SERPL-MCNC: 148 MG/DL
HBA1C MFR BLD HPLC: 6.5 %
HDLC SERPL-MCNC: 57 MG/DL
HDLC SERPL: 24.7 %
LDLC SERPL CALC-MCNC: 146 MG/DL
NONHDLC SERPL-MCNC: 174 MG/DL
POTASSIUM SERPL-SCNC: 5.1 MMOL/L
PROT SERPL-MCNC: 7 G/DL
SODIUM SERPL-SCNC: 141 MMOL/L
TRIGL SERPL-MCNC: 140 MG/DL

## 2018-12-04 PROCEDURE — 80076 HEPATIC FUNCTION PANEL: CPT

## 2018-12-04 PROCEDURE — 82306 VITAMIN D 25 HYDROXY: CPT

## 2018-12-04 PROCEDURE — 80061 LIPID PANEL: CPT

## 2018-12-04 PROCEDURE — 36415 COLL VENOUS BLD VENIPUNCTURE: CPT

## 2018-12-04 PROCEDURE — 83036 HEMOGLOBIN GLYCOSYLATED A1C: CPT

## 2018-12-04 PROCEDURE — 80048 BASIC METABOLIC PNL TOTAL CA: CPT

## 2018-12-08 RX ORDER — EZETIMIBE 10 MG/1
TABLET ORAL
Qty: 30 TABLET | Refills: 8 | Status: SHIPPED | OUTPATIENT
Start: 2018-12-08 | End: 2019-10-18 | Stop reason: SDUPTHER

## 2018-12-10 ENCOUNTER — OFFICE VISIT (OUTPATIENT)
Dept: INTERNAL MEDICINE | Facility: CLINIC | Age: 73
End: 2018-12-10
Payer: MEDICARE

## 2018-12-10 ENCOUNTER — OFFICE VISIT (OUTPATIENT)
Dept: ORTHOPEDICS | Facility: CLINIC | Age: 73
End: 2018-12-10
Payer: MEDICARE

## 2018-12-10 ENCOUNTER — DOCUMENTATION ONLY (OUTPATIENT)
Dept: INTERNAL MEDICINE | Facility: CLINIC | Age: 73
End: 2018-12-10

## 2018-12-10 VITALS
HEART RATE: 82 BPM | DIASTOLIC BLOOD PRESSURE: 82 MMHG | SYSTOLIC BLOOD PRESSURE: 126 MMHG | BODY MASS INDEX: 31.2 KG/M2 | WEIGHT: 182.75 LBS | HEIGHT: 64 IN

## 2018-12-10 VITALS
WEIGHT: 180.75 LBS | DIASTOLIC BLOOD PRESSURE: 81 MMHG | BODY MASS INDEX: 30.86 KG/M2 | HEIGHT: 64 IN | SYSTOLIC BLOOD PRESSURE: 167 MMHG

## 2018-12-10 DIAGNOSIS — M79.671 RIGHT FOOT PAIN: Primary | ICD-10-CM

## 2018-12-10 DIAGNOSIS — E55.9 VITAMIN D DEFICIENCY: ICD-10-CM

## 2018-12-10 DIAGNOSIS — E11.9 TYPE 2 DIABETES MELLITUS WITHOUT COMPLICATION, WITHOUT LONG-TERM CURRENT USE OF INSULIN: Primary | ICD-10-CM

## 2018-12-10 DIAGNOSIS — Z85.3 HX: BREAST CANCER: ICD-10-CM

## 2018-12-10 DIAGNOSIS — Z12.11 COLON CANCER SCREENING: ICD-10-CM

## 2018-12-10 PROCEDURE — 99999 PR PBB SHADOW E&M-EST. PATIENT-LVL III: CPT | Mod: PBBFAC,,, | Performed by: INTERNAL MEDICINE

## 2018-12-10 PROCEDURE — 99214 OFFICE O/P EST MOD 30 MIN: CPT | Mod: S$PBB,,, | Performed by: INTERNAL MEDICINE

## 2018-12-10 PROCEDURE — 99214 OFFICE O/P EST MOD 30 MIN: CPT | Mod: PBBFAC,27 | Performed by: ORTHOPAEDIC SURGERY

## 2018-12-10 PROCEDURE — 99999 PR PBB SHADOW E&M-EST. PATIENT-LVL IV: CPT | Mod: PBBFAC,,, | Performed by: ORTHOPAEDIC SURGERY

## 2018-12-10 PROCEDURE — 99213 OFFICE O/P EST LOW 20 MIN: CPT | Mod: PBBFAC | Performed by: INTERNAL MEDICINE

## 2018-12-10 PROCEDURE — 99203 OFFICE O/P NEW LOW 30 MIN: CPT | Mod: S$PBB,,, | Performed by: ORTHOPAEDIC SURGERY

## 2018-12-10 RX ORDER — CHOLECALCIFEROL (VITAMIN D3) 50 MCG
2 TABLET ORAL DAILY
Start: 2018-12-10

## 2018-12-10 NOTE — PROGRESS NOTES
Subjective:       Patient ID: Loni Heard is a 73 y.o. female.    Chief Complaint: Follow-up; Cough; and Chest Congestion   This is a 73-year-old who presents today for follow-up.  Patient reports that she has been following with her specialist at [facility] for her breast cancer she underwent surgery then radiation for her left breast.  She completed her radiation and had a bit of complication with a bit of skin is breakdown but that seemed to have resolved she reports things are improving she has seen her radiation oncologist her surgeon and her oncologist all at [facility] reports they are planning on starting her on some new medication for breast cancer prevention but she has not started yet.  She was having difficulty with her knees so she went to see her outlying orthopedist and reports that they did some imaging had a meniscal tear she had some type of protein platelet injection procedure to see if it will help with her symptoms she had previously reports that did help for some time.  She also has some atypical discomfort is her abdomen on occasion [facility] and they are planning on doing an EGD in the near future.  She has been taking her vitamin-D 2000 units over-the-counter regularly    HPI  Review of Systems   Constitutional: Positive for activity change. Negative for unexpected weight change.   HENT: Negative for hearing loss, rhinorrhea and trouble swallowing.    Eyes: Negative for discharge and visual disturbance.   Respiratory: Negative for chest tightness and wheezing.    Cardiovascular: Negative for chest pain and palpitations.   Gastrointestinal: Negative for blood in stool, constipation, diarrhea and vomiting.   Endocrine: Negative for polydipsia and polyuria.   Genitourinary: Negative for difficulty urinating, dysuria, hematuria and menstrual problem.   Musculoskeletal: Positive for joint swelling. Negative for arthralgias and neck pain.   Neurological: Positive for weakness.  "Negative for headaches.   Psychiatric/Behavioral: Negative for confusion and dysphoric mood.       Objective:     Blood pressure 126/82, pulse 82, height 5' 4" (1.626 m), weight 82.9 kg (182 lb 12.2 oz).    Physical Exam   Constitutional: No distress.   HENT:   Head: Normocephalic.   Mouth/Throat: Oropharynx is clear and moist.   Eyes: No scleral icterus.   Neck: Neck supple.   Cardiovascular: Normal rate, regular rhythm and normal heart sounds. Exam reveals no gallop and no friction rub.   No murmur heard.  Pulmonary/Chest: Effort normal and breath sounds normal. No respiratory distress.   Surgical changes radiation changes  Left breast    Abdominal: Soft. Bowel sounds are normal. She exhibits no mass. There is no tenderness.   Musculoskeletal: She exhibits no edema.   Right knee some discomfort rom    Neurological: She is alert.   Skin: No erythema.   Psychiatric: She has a normal mood and affect.   Vitals reviewed.      Assessment:       1. Type 2 diabetes mellitus without complication, without long-term current use of insulin    2. Colon cancer screening    3. Vitamin D deficiency    4. HX: breast cancer        Plan:       Loni was seen today for follow-up, cough and chest congestion.    Diagnoses and all orders for this visit:    Type 2 diabetes mellitus without complication, without long-term current use of insulin  continue Januvia exercise when she is able to get back to   -     Basic metabolic panel; Future  -     Hemoglobin A1c; Future  -     Hepatic function panel; Future  -     Vitamin D; Future  -     CBC auto differential; Future    Colon cancer screening  -     Fecal Immunochemical Test (iFOBT); Future    Vitamin D deficiency  Discussed with patient will increase her vitamin-D to 4000 she will take daily   -     Vitamin D; Future    HX: breast cancer  She is following with  Her specialist including radiation oncology Hematology Oncology in her breast surgeon at [facility] with occasional " abdominal discomfort   She is following with outlying GI and has an upcoming EGD planned for further evaluation    Uri viral conservative measures     Follow up 4 months

## 2018-12-10 NOTE — PROGRESS NOTES
Answers for HPI/ROS submitted by the patient on 12/4/2018   activity change: Yes  unexpected weight change: No  neck pain: No  hearing loss: No  rhinorrhea: No  trouble swallowing: No  eye discharge: No  visual disturbance: No  chest tightness: No  wheezing: No  chest pain: No  palpitations: No  blood in stool: No  constipation: No  vomiting: No  diarrhea: No  polydipsia: No  polyuria: No  difficulty urinating: No  hematuria: No  menstrual problem: No  dysuria: No  joint swelling: Yes  arthralgias: No  headaches: No  weakness: Yes  confusion: No  dysphoric mood: No

## 2018-12-10 NOTE — PROGRESS NOTES
DATE: 12/10/2018  PATIENT: Loni Heard    CHIEF COMPLAINT: R third toe pain    HPI:  73F with h/o R neurectomy third webbed space 2005 presents for evaluation of R third toe pain.  2 years duration.  Denies injury or other precipitating factor.  Described as burning.  7/10 severity.  Increasing in severity since onset.  Worsened by weightbearing activity.  Improved by rest.  History of similar symptoms relieved by neurectomy.  There are associated paresthesias.  Prior treatment has included NSAIDs.    PAST MEDICAL/SURGICAL HISTORY:  Past Medical History:   Diagnosis Date    Allergy     hx rhinitis     Arthritis     osteoathrtis knee    Asthma     childhood asthma     Cancer     left breast invasive ductal carcinoma     Depression     Diabetes mellitus type II     Hyperlipidemia     Mild vitamin D deficiency     Pulmonary nodule     micronodule 8/2017      Past Surgical History:   Procedure Laterality Date    BREAST SURGERY Left 07/2018    left breast lumpectomy     eyelid cyst removed      foreign body removed upper airway       left ankle fracture and repair/orif       neuroma removed      right foot     TONSILLECTOMY         Family History:   Family History   Problem Relation Age of Onset    Parkinsonism Mother     Dementia Mother     Stroke Father     Diabetes Father     Diabetes Brother        Social History:   Social History     Socioeconomic History    Marital status: Single     Spouse name: Not on file    Number of children: Not on file    Years of education: Not on file    Highest education level: Not on file   Social Needs    Financial resource strain: Not on file    Food insecurity - worry: Not on file    Food insecurity - inability: Not on file    Transportation needs - medical: Not on file    Transportation needs - non-medical: Not on file   Occupational History    Not on file   Tobacco Use    Smoking status: Never Smoker   Substance and Sexual Activity     "Alcohol use: Yes     Comment: rarely    Drug use: No    Sexual activity: Not Currently   Other Topics Concern    Not on file   Social History Narrative    Not on file       Current Medications:   Current Outpatient Medications:     ascorbic acid, vitamin C, (VITAMIN C) 250 mg Chew, Take by mouth., Disp: , Rfl:     blood sugar diagnostic Strp, Use for blood sugar testing once daily - product selection permitted, Disp: 100 each, Rfl: 3    blood-glucose meter Misc, Use for blood sugar testing as instructed once daily - product selection permitted, Disp: 1 each, Rfl: 0    cholecalciferol, vitamin D3, (VITAMIN D3) 2,000 unit Tab, Take by mouth once daily., Disp: , Rfl:     ezetimibe (ZETIA) 10 mg tablet, TAKE 1 TABLET(10 MG) BY MOUTH EVERY DAY, Disp: 30 tablet, Rfl: 8    JANUVIA 50 mg Tab, TAKE 1 TABLET(50 MG) BY MOUTH EVERY DAY, Disp: 30 tablet, Rfl: 6    lancets Misc, Use for blood sugar testing once daily - product selection permitted, Disp: 100 each, Rfl: 3    multivitamin with minerals tablet, Take 1 tablet by mouth once daily., Disp: , Rfl:     pantoprazole (PROTONIX) 40 MG tablet, TAKE 1 TABLET(40 MG) BY MOUTH EVERY DAY, Disp: 90 tablet, Rfl: 0    TRUEPLUS LANCETS 28 gauge Misc, USE TO TEST BLOOD SUGAR ONCE D, Disp: , Rfl: 3    VITAMIN A ORAL, Take 1 tablet by mouth every other day. , Disp: , Rfl:     omega-3 acid ethyl esters (LOVAZA) 1 gram capsule, Take 1 capsule (1 g total) by mouth 2 (two) times daily., Disp: 180 capsule, Rfl: 1    Allergies:   Review of patient's allergies indicates:   Allergen Reactions    Codeine Anaphylaxis     Other reaction(s): Anaphylaxis    Penicillins      Other reaction(s): dizzy     Niacin preparations Other (See Comments)     Flushing/lips swelling        REVIEW OF SYSTEMS:  Constitutional: negative for fevers  Musculoskeletal: positive for paresthesias    PHYSICAL EXAMINATION:    BP (!) 167/81   Ht 5' 4" (1.626 m)   Wt 82 kg (180 lb 12.4 oz)   BMI 31.03 " kg/m²     Vitals:  Vital signs stable.  General: No acute distress.  Cardio: Regular rate.  Chest: No increased work of breathing.     MSK:  RLE:   Well healed longitudinal surgical incision proximal to third web space  No deformity  No ecchymoses  No swelling  TTP mild in third web space  No pain with toe ROM  SILT medial 4th toe; decreased sensation medial and lateral aspects of third toe  Motor intact T/SP/DP  Brisk cap refill  Warm well perfused extremities  DP palpable    IMAGING:     XR none.    ASSESSMENT/PLAN:    Loni was seen today for follow-up.    Diagnoses and all orders for this visit:    Right foot pain  -     X-Ray Foot Complete Right; Future        73F with R third toe pain concerning for recurrence of neuroma.  Discussed options including observation, shoe and activity modification, injection for diagnostic and therapeutic purposes, further imaging, surgery.  She opts for observation at this time.  F/u 8 weeks for reevaluation with new XR R foot complete, standing.  If she is no better or worse, may consider injection vs MRI at that time.    I have personally taken the history and examined this patient and agree with the residents note as stated above.  Previous neuroma resection right foot 3rd intermetatarsal space dorsal incision many years ago with good relief. Began having intermittent neurogenic pain of right 3rd toe a couple of years ago which is not typical of recurrent neuroma but cannot be ruled out. I do not appreciate any obvious structural abnormalities. Discussed diagnostic injection but it may not be helpful since her pain is intermittent and she is not symptomatic at this time.

## 2018-12-13 ENCOUNTER — OFFICE VISIT (OUTPATIENT)
Dept: URGENT CARE | Facility: CLINIC | Age: 73
End: 2018-12-13
Payer: MEDICARE

## 2018-12-13 VITALS
TEMPERATURE: 98 F | DIASTOLIC BLOOD PRESSURE: 86 MMHG | WEIGHT: 180 LBS | BODY MASS INDEX: 30.73 KG/M2 | SYSTOLIC BLOOD PRESSURE: 139 MMHG | HEIGHT: 64 IN | OXYGEN SATURATION: 96 % | RESPIRATION RATE: 15 BRPM | HEART RATE: 84 BPM

## 2018-12-13 DIAGNOSIS — R52 GENERALIZED BODY ACHES: ICD-10-CM

## 2018-12-13 DIAGNOSIS — J06.9 VIRAL URI WITH COUGH: Primary | ICD-10-CM

## 2018-12-13 DIAGNOSIS — J06.9 VIRAL URI WITH COUGH: ICD-10-CM

## 2018-12-13 LAB
CTP QC/QA: YES
FLUAV AG NPH QL: NEGATIVE
FLUBV AG NPH QL: NEGATIVE

## 2018-12-13 PROCEDURE — 87804 INFLUENZA ASSAY W/OPTIC: CPT | Mod: 59,QW,S$GLB, | Performed by: NURSE PRACTITIONER

## 2018-12-13 PROCEDURE — 99214 OFFICE O/P EST MOD 30 MIN: CPT | Mod: S$GLB,,, | Performed by: NURSE PRACTITIONER

## 2018-12-13 RX ORDER — FLUTICASONE PROPIONATE 50 MCG
SPRAY, SUSPENSION (ML) NASAL
Qty: 48 ML | Refills: 0 | Status: SHIPPED | OUTPATIENT
Start: 2018-12-13 | End: 2019-02-04

## 2018-12-13 RX ORDER — FLUTICASONE PROPIONATE 50 MCG
1 SPRAY, SUSPENSION (ML) NASAL 2 TIMES DAILY
Qty: 1 BOTTLE | Refills: 0 | Status: SHIPPED | OUTPATIENT
Start: 2018-12-13 | End: 2018-12-13 | Stop reason: SDUPTHER

## 2018-12-13 RX ORDER — BENZONATATE 100 MG/1
100 CAPSULE ORAL EVERY 6 HOURS PRN
Qty: 30 CAPSULE | Refills: 0 | Status: SHIPPED | OUTPATIENT
Start: 2018-12-13 | End: 2019-02-04

## 2018-12-13 NOTE — PATIENT INSTRUCTIONS
You may continue taking Tylenol (acetaminophen) or ibuprofen for pain and fever.      Viral Upper Respiratory Illness (Adult)   You have a viral upper respiratory illness (URI), which is another term for the common cold. This illness is contagious during the first few days. It is spread through the air by coughing and sneezing. It may also be spread by direct contact (touching the sick person and then touching your own eyes, nose, or mouth). Frequent handwashing will decrease risk of spread. Most viral illnesses go away within 7 to 10 days with rest and simple home remedies. Sometimes the illness may last for several weeks. Antibiotics will not kill a virus, and they are generally not prescribed for this condition.    Home care  · If symptoms are severe, rest at home for the first 2 to 3 days. When you resume activity, don't let yourself get too tired.  · Avoid being exposed to cigarette smoke (yours or others).  · You may use acetaminophen or ibuprofen to control pain and fever, unless another medicine was prescribed. (Note: If you have chronic liver or kidney disease, have ever had a stomach ulcer or gastrointestinal bleeding, or are taking blood-thinning medicines, talk with your healthcare provider before using these medicines.) Aspirin should never be given to anyone under 18 years of age who is ill with a viral infection or fever. It may cause severe liver or brain damage.  · Your appetite may be poor, so a light diet is fine. Avoid dehydration by drinking 6 to 8 glasses of fluids per day (water, soft drinks, juices, tea, or soup). Extra fluids will help loosen secretions in the nose and lungs.  · Over-the-counter cold medicines will not shorten the length of time youre sick, but they may be helpful for the following symptoms: cough, sore throat, and nasal and sinus congestion. (Note: Do not use decongestants if you have high blood pressure.)  Follow-up care  Follow up with your healthcare provider, or as  advised.  When to seek medical advice  Call your healthcare provider right away if any of these occur:  · Cough with lots of colored sputum (mucus)  · Severe headache; face, neck, or ear pain  · Difficulty swallowing due to throat pain  · Fever of 100.4°F (38°C)  Call 911, or get immediate medical care  Call emergency services right away if any of these occur:  · Chest pain, shortness of breath, wheezing, or difficulty breathing  · Coughing up blood  · Inability to swallow due to throat pain  Date Last Reviewed: 9/13/2015  © 1839-0312 [employer]. [address] [place] [address]. All rights reserved. This information is not intended as a substitute for professional medical care. Always follow your healthcare professional's instructions.

## 2018-12-13 NOTE — PROGRESS NOTES
"Subjective:       Patient ID: Loni Heard is a 73 y.o. female.    Vitals:  height is 5' 4" (1.626 m) and weight is 81.6 kg (180 lb). Her temperature is 98.2 °F (36.8 °C). Her blood pressure is 139/86 and her pulse is 84. Her respiration is 15 and oxygen saturation is 96%.     Chief Complaint: URI    Patient [place] local. Patient is in for a deep cough(dry), lots of congestion, had fever, sore throat, body aches, and headaches-for 4 days. Tired otc cold and flu, fluticasone nasal spray, and nyquil-nothing really helped.    No known sick contacts. One bout of loose stool. No vomiting. Chills and body aches, but no measured fever.      URI    This is a new problem. The current episode started in the past 7 days. The problem has been unchanged. Associated symptoms include chest pain, congestion, coughing, headaches, a plugged ear sensation, rhinorrhea, sinus pain, sneezing, a sore throat and wheezing. Pertinent negatives include no abdominal pain, diarrhea, ear pain, nausea, rash or vomiting.       Constitution: Positive for chills and fatigue. Negative for sweating and fever.   HENT: Positive for congestion, postnasal drip, sinus pain, sinus pressure and sore throat. Negative for ear pain and voice change.    Neck: Negative for painful lymph nodes.   Cardiovascular: Positive for chest pain.   Eyes: Negative for eye redness.   Respiratory: Positive for cough, shortness of breath and wheezing. Negative for chest tightness, sputum production, bloody sputum, COPD, stridor and asthma.    Gastrointestinal: Negative for abdominal pain, nausea, vomiting and diarrhea.   Musculoskeletal: Negative for muscle ache.   Skin: Negative for rash.   Allergic/Immunologic: Positive for sneezing. Negative for seasonal allergies and asthma.   Neurological: Positive for headaches.   Hematologic/Lymphatic: Negative for swollen lymph nodes.       Objective:      Physical Exam   Constitutional: She is oriented to person, place, and " time. She appears well-developed and well-nourished. She is cooperative.  Non-toxic appearance. She does not appear ill. No distress.   HENT:   Head: Normocephalic and atraumatic.   Right Ear: Hearing, tympanic membrane, external ear and ear canal normal.   Left Ear: Hearing, tympanic membrane, external ear and ear canal normal.   Nose: Nose normal. No mucosal edema, rhinorrhea or nasal deformity. No epistaxis. Right sinus exhibits no maxillary sinus tenderness and no frontal sinus tenderness. Left sinus exhibits no maxillary sinus tenderness and no frontal sinus tenderness.   Mouth/Throat: Uvula is midline and mucous membranes are normal. No trismus in the jaw. Normal dentition. No uvula swelling. Posterior oropharyngeal erythema present.   Eyes: Conjunctivae and lids are normal. No scleral icterus.   Sclera clear bilat   Neck: Trachea normal, full passive range of motion without pain and phonation normal. Neck supple.   Cardiovascular: Normal rate, regular rhythm, normal heart sounds, intact distal pulses and normal pulses.   Pulmonary/Chest: Effort normal and breath sounds normal. No stridor. No respiratory distress. She has no decreased breath sounds. She has no wheezes.   Abdominal: Soft. Normal appearance and bowel sounds are normal. She exhibits no distension. There is no tenderness.   Musculoskeletal: Normal range of motion. She exhibits no edema or deformity.   Neurological: She is alert and oriented to person, place, and time. She exhibits normal muscle tone. Coordination normal.   Skin: Skin is warm, dry and intact. She is not diaphoretic. No pallor.   Psychiatric: She has a normal mood and affect. Her speech is normal and behavior is normal. Judgment and thought content normal. Cognition and memory are normal.   Nursing note and vitals reviewed.       POCT Influenza A/B   Order: [id number]   Status:  Final result   Visible to patient:  No (Not Released) Next appt:  02/04/2019 at 10:00 AM in Orthopedics  (Min Pond MD) Dx:  Generalized body aches    Ref Range & Units 11:25 2yr ago   Rapid Influenza A Ag Negative Negative     Rapid Influenza B Ag Negative Negative      Acceptable  Yes  Yes    Resulting Agency  [facility]              Assessment:       1. Viral URI with cough    2. Generalized body aches        Plan:         Viral URI with cough  -     benzonatate (TESSALON PERLES) 100 MG capsule; Take 1 capsule (100 mg total) by mouth every 6 (six) hours as needed for Cough.  Dispense: 30 capsule; Refill: 0  -     fluticasone (FLONASE) 50 mcg/actuation nasal spray; 1 spray (50 mcg total) by Each Nare route 2 (two) times daily. for 14 days  Dispense: 1 Bottle; Refill: 0    Generalized body aches  -     POCT Influenza A/B      Patient Instructions     You may continue taking Tylenol (acetaminophen) or ibuprofen for pain and fever.      Viral Upper Respiratory Illness (Adult)   You have a viral upper respiratory illness (URI), which is another term for the common cold. This illness is contagious during the first few days. It is spread through the air by coughing and sneezing. It may also be spread by direct contact (touching the sick person and then touching your own eyes, nose, or mouth). Frequent handwashing will decrease risk of spread. Most viral illnesses go away within 7 to 10 days with rest and simple home remedies. Sometimes the illness may last for several weeks. Antibiotics will not kill a virus, and they are generally not prescribed for this condition.    Home care  · If symptoms are severe, rest at home for the first 2 to 3 days. When you resume activity, don't let yourself get too tired.  · Avoid being exposed to cigarette smoke (yours or others).  · You may use acetaminophen or ibuprofen to control pain and fever, unless another medicine was prescribed. (Note: If you have chronic liver or kidney disease, have ever had a stomach ulcer or gastrointestinal bleeding, or are taking  blood-thinning medicines, talk with your healthcare provider before using these medicines.) Aspirin should never be given to anyone under 18 years of age who is ill with a viral infection or fever. It may cause severe liver or brain damage.  · Your appetite may be poor, so a light diet is fine. Avoid dehydration by drinking 6 to 8 glasses of fluids per day (water, soft drinks, juices, tea, or soup). Extra fluids will help loosen secretions in the nose and lungs.  · Over-the-counter cold medicines will not shorten the length of time youre sick, but they may be helpful for the following symptoms: cough, sore throat, and nasal and sinus congestion. (Note: Do not use decongestants if you have high blood pressure.)  Follow-up care  Follow up with your healthcare provider, or as advised.  When to seek medical advice  Call your healthcare provider right away if any of these occur:  · Cough with lots of colored sputum (mucus)  · Severe headache; face, neck, or ear pain  · Difficulty swallowing due to throat pain  · Fever of 100.4°F (38°C)  Call 911, or get immediate medical care  Call emergency services right away if any of these occur:  · Chest pain, shortness of breath, wheezing, or difficulty breathing  · Coughing up blood  · Inability to swallow due to throat pain  Date Last Reviewed: 9/13/2015  © 0983-1422 [employer]. [address] [place] [address], [place] [address]. All rights reserved. This information is not intended as a substitute for professional medical care. Always follow your healthcare professional's instructions.

## 2018-12-14 ENCOUNTER — OFFICE VISIT (OUTPATIENT)
Dept: URGENT CARE | Facility: CLINIC | Age: 73
End: 2018-12-14
Payer: MEDICARE

## 2018-12-14 VITALS
OXYGEN SATURATION: 97 % | RESPIRATION RATE: 18 BRPM | DIASTOLIC BLOOD PRESSURE: 94 MMHG | BODY MASS INDEX: 30.73 KG/M2 | WEIGHT: 180 LBS | HEIGHT: 64 IN | HEART RATE: 94 BPM | SYSTOLIC BLOOD PRESSURE: 154 MMHG | TEMPERATURE: 99 F

## 2018-12-14 DIAGNOSIS — J40 BRONCHITIS: Primary | ICD-10-CM

## 2018-12-14 DIAGNOSIS — J06.9 VIRAL URI WITH COUGH: ICD-10-CM

## 2018-12-14 DIAGNOSIS — R05.9 COUGH: ICD-10-CM

## 2018-12-14 PROCEDURE — 94640 AIRWAY INHALATION TREATMENT: CPT | Mod: S$GLB,,, | Performed by: EMERGENCY MEDICINE

## 2018-12-14 PROCEDURE — 99214 OFFICE O/P EST MOD 30 MIN: CPT | Mod: S$GLB,,, | Performed by: NURSE PRACTITIONER

## 2018-12-14 RX ORDER — ALBUTEROL SULFATE 0.83 MG/ML
2.5 SOLUTION RESPIRATORY (INHALATION)
Status: COMPLETED | OUTPATIENT
Start: 2018-12-14 | End: 2018-12-14

## 2018-12-14 RX ADMIN — ALBUTEROL SULFATE 2.5 MG: 0.83 SOLUTION RESPIRATORY (INHALATION) at 04:12

## 2018-12-14 NOTE — PROGRESS NOTES
"Subjective:       Patient ID: Loni Heard is a 73 y.o. female.    Vitals:  height is 5' 4" (1.626 m) and weight is 81.6 kg (180 lb). Her temperature is 98.8 °F (37.1 °C). Her blood pressure is 154/94 (abnormal) and her pulse is 94. Her respiration is 18 and oxygen saturation is 97%.     Chief Complaint: URI    Patient was here for the same problem yesterday. Patient still does not feel any better and state that she would like provider to call her hemotologist so that she can have a steroid.     Patient still coughing. Still no fever. Feels fatigued but has taken several doses of Robitussin DM (to good effect).  Tessalon did not help as much per patient.    Further review, patient has Rx for albuterol and salmeterol for esophageal issue?  Stated it may worth continuing and gave albuterol nebs treatment in office.  Much improved after treatment.    Has EGD scheduled in upcoming weeks for above issue.      URI    This is a new problem. The current episode started in the past 7 days. There has been no fever. Associated symptoms include congestion, coughing, headaches and sinus pain. Pertinent negatives include no ear pain, nausea, rash, sore throat, vomiting or wheezing.       Constitution: Positive for fatigue. Negative for chills, sweating and fever.   HENT: Positive for congestion and sinus pain. Negative for ear pain, sinus pressure, sore throat and voice change.    Neck: Negative for painful lymph nodes.   Eyes: Negative for eye redness.   Respiratory: Positive for cough. Negative for chest tightness, sputum production, bloody sputum, COPD, shortness of breath, stridor, wheezing and asthma.    Gastrointestinal: Negative for nausea and vomiting.   Musculoskeletal: Negative for muscle ache.   Skin: Negative for rash.   Allergic/Immunologic: Negative for seasonal allergies and asthma.   Neurological: Positive for headaches.   Hematologic/Lymphatic: Negative for swollen lymph nodes.       Objective:    "   Physical Exam   Constitutional: She is oriented to person, place, and time. She appears well-developed and well-nourished. She is cooperative.  Non-toxic appearance. She does not appear ill. No distress.   HENT:   Head: Normocephalic and atraumatic.   Right Ear: Hearing, tympanic membrane, external ear and ear canal normal.   Left Ear: Hearing, tympanic membrane, external ear and ear canal normal.   Nose: Rhinorrhea present. No mucosal edema or nasal deformity. No epistaxis. Right sinus exhibits no maxillary sinus tenderness and no frontal sinus tenderness. Left sinus exhibits no maxillary sinus tenderness and no frontal sinus tenderness.   Mouth/Throat: Uvula is midline and mucous membranes are normal. No trismus in the jaw. Normal dentition. No uvula swelling. Posterior oropharyngeal erythema present.   Eyes: Conjunctivae and lids are normal. No scleral icterus.   Sclera clear bilat   Neck: Trachea normal, full passive range of motion without pain and phonation normal. Neck supple.   Cardiovascular: Normal rate, regular rhythm, normal heart sounds, intact distal pulses and normal pulses.   Pulmonary/Chest: Effort normal. No stridor. No respiratory distress. She has no decreased breath sounds. She has wheezes in the left lower field.   Sibilant wheeze cleared with coughing   Abdominal: Soft. Normal appearance and bowel sounds are normal. She exhibits no distension. There is no tenderness.   Musculoskeletal: Normal range of motion. She exhibits no edema or deformity.   Neurological: She is alert and oriented to person, place, and time. She exhibits normal muscle tone. Coordination normal.   Skin: Skin is warm, dry and intact. She is not diaphoretic. No pallor.   Psychiatric: She has a normal mood and affect. Her speech is normal and behavior is normal. Judgment and thought content normal. Cognition and memory are normal.   Nursing note and vitals reviewed.      Assessment:       1. Bronchitis    2. Cough    3.  Viral URI with cough        Plan:         Bronchitis    Cough  -     albuterol nebulizer solution 2.5 mg    Viral URI with cough      Patient Instructions       Please use the Ventolin and Salmeterol you were previously prescribed starting today.    If symptoms do not improve or worsen, please follow up here or your primary provider.    What Is Acute Bronchitis?   Acute bronchitis is when the airways in your lungs (bronchial tubes) become red and swollen (inflamed). It is usually caused by a viral infection. But it can also occur because of a bacteria or allergen. Symptoms include a cough that produces yellow or greenish mucus and can last for days or sometimes weeks.  Inside healthy lungs    Air travels in and out of the lungs through the airways. The linings of these airways produce sticky mucus. This mucus traps particles that enter the lungs. Tiny structures called cilia then sweep the particles out of the airways.     Healthy airway: Airways are normally open. Air moves in and out easily.      Healthy cilia: Tiny, hairlike cilia sweep mucus and particles up and out of the airways.   Lungs with bronchitis  Bronchitis often occurs with a cold or the flu virus. The airways become inflamed (red and swollen). There is a deep hacking cough from the extra mucus. Other symptoms may include:  · Wheezing  · Chest discomfort  · Shortness of breath  · Mild fever  A second infection, this time due to bacteria, may then occur. And airways irritated by allergens or smoke are more likely to get infected.        Inflamed airway: Inflammation and extra mucus narrow the airway, causing shortness of breath.      Impaired cilia: Extra mucus impairs cilia, causing congestion and wheezing. Smoking makes the problem worse.   Making a diagnosis  A physical exam, health history, and certain tests help your healthcare provider make the diagnosis.  Health history  Your healthcare provider will ask you about your symptoms.  The exam  Your  provider listens to your chest for signs of congestion. He or she may also check your ears, nose, and throat.  Possible tests  · A sputum test for bacteria. This requires a sample of mucus from your lungs.  · A nasal or throat swab. This tests to see if you have a bacterial infection.  · A chest X-ray. This is done if your healthcare provider thinks you have pneumonia.  · Tests to check for an underlying condition. Other tests may be done to check for things such as allergies, asthma, or COPD (chronic obstructive pulmonary disease). You may need to see a specialist for more lung function testing.  Treating a cough  The main treatment for bronchitis is easing symptoms. Avoiding smoke, allergens, and other things that trigger coughing can often help. If the infection is bacterial, you may be given antibiotics. During the illness, it's important to get plenty of sleep. To ease symptoms:  · Dont smoke. Also avoid secondhand smoke.  · Use a humidifier. Or try breathing in steam from a hot shower. This may help loosen mucus.  · Drink a lot of water and juice. They can soothe the throat and may help thin mucus.  · Sit up or use extra pillows when in bed. This helps to lessen coughing and congestion.  · Ask your provider about using medicine. Ask about using cough medicine, pain and fever medicine, or a decongestant.  Antibiotics  Most cases of bronchitis are caused by cold or flu viruses. They dont need antibiotics to treat them, even if your mucus is thick and green or yellow. Antibiotics dont treat viral illness and antibiotics have not been shown to have any benefit in cases of acute bronchitis. Taking antibiotics when they are not needed increases your risk of getting an infection later that is antibiotic-resistant. Antibiotics can also cause severe cases of diarrhea that require other antibiotics to treat.  It is important that you accept your healthcare provider's opinion to not use antibiotics. Your provider will  prescribe antibiotics if the infection is caused by bacteria. If they are prescribed:  · Take all of the medicine. Take the medicine until it is used up, even if symptoms have improved. If you dont, the bronchitis may come back.  · Take the medicines as directed. For instance, some medicines should be taken with food.  · Ask about side effects. Ask your provider or pharmacist what side effects are common, and what to do about them.  Follow-up care  You should see your provider again in 2 to 3 weeks. By this time, symptoms should have improved. An infection that lasts longer may mean you have a more serious problem.  Prevention  · Avoid tobacco smoke. If you smoke, quit. Stay away from smoky places. Ask friends and family not to smoke around you, or in your home or car.  · Get checked for allergies.  · Ask your provider about getting a yearly flu shot. Also ask about pneumococcal or pneumonia shots.  · Wash your hands often. This helps reduce the chance of picking up viruses that cause colds and flu.  Call your healthcare provider if:  · Symptoms worsen, or you have new symptoms  · Breathing problems worsen or  become severe  · Symptoms dont get better within a week, or within 3 days of taking antibiotics   Date Last Reviewed: 2/1/2017  © 9332-0359 [employer]. [address] [place] [address], [place] [address]. All rights reserved. This information is not intended as a substitute for professional medical care. Always follow your healthcare professional's instructions.

## 2018-12-14 NOTE — PATIENT INSTRUCTIONS
Please use the Ventolin and Salmeterol you were previously prescribed starting today.    If symptoms do not improve or worsen, please follow up here or your primary provider.    What Is Acute Bronchitis?   Acute bronchitis is when the airways in your lungs (bronchial tubes) become red and swollen (inflamed). It is usually caused by a viral infection. But it can also occur because of a bacteria or allergen. Symptoms include a cough that produces yellow or greenish mucus and can last for days or sometimes weeks.  Inside healthy lungs    Air travels in and out of the lungs through the airways. The linings of these airways produce sticky mucus. This mucus traps particles that enter the lungs. Tiny structures called cilia then sweep the particles out of the airways.     Healthy airway: Airways are normally open. Air moves in and out easily.      Healthy cilia: Tiny, hairlike cilia sweep mucus and particles up and out of the airways.   Lungs with bronchitis  Bronchitis often occurs with a cold or the flu virus. The airways become inflamed (red and swollen). There is a deep hacking cough from the extra mucus. Other symptoms may include:  · Wheezing  · Chest discomfort  · Shortness of breath  · Mild fever  A second infection, this time due to bacteria, may then occur. And airways irritated by allergens or smoke are more likely to get infected.        Inflamed airway: Inflammation and extra mucus narrow the airway, causing shortness of breath.      Impaired cilia: Extra mucus impairs cilia, causing congestion and wheezing. Smoking makes the problem worse.   Making a diagnosis  A physical exam, health history, and certain tests help your healthcare provider make the diagnosis.  Health history  Your healthcare provider will ask you about your symptoms.  The exam  Your provider listens to your chest for signs of congestion. He or she may also check your ears, nose, and throat.  Possible tests  · A sputum test for bacteria.  This requires a sample of mucus from your lungs.  · A nasal or throat swab. This tests to see if you have a bacterial infection.  · A chest X-ray. This is done if your healthcare provider thinks you have pneumonia.  · Tests to check for an underlying condition. Other tests may be done to check for things such as allergies, asthma, or COPD (chronic obstructive pulmonary disease). You may need to see a specialist for more lung function testing.  Treating a cough  The main treatment for bronchitis is easing symptoms. Avoiding smoke, allergens, and other things that trigger coughing can often help. If the infection is bacterial, you may be given antibiotics. During the illness, it's important to get plenty of sleep. To ease symptoms:  · Dont smoke. Also avoid secondhand smoke.  · Use a humidifier. Or try breathing in steam from a hot shower. This may help loosen mucus.  · Drink a lot of water and juice. They can soothe the throat and may help thin mucus.  · Sit up or use extra pillows when in bed. This helps to lessen coughing and congestion.  · Ask your provider about using medicine. Ask about using cough medicine, pain and fever medicine, or a decongestant.  Antibiotics  Most cases of bronchitis are caused by cold or flu viruses. They dont need antibiotics to treat them, even if your mucus is thick and green or yellow. Antibiotics dont treat viral illness and antibiotics have not been shown to have any benefit in cases of acute bronchitis. Taking antibiotics when they are not needed increases your risk of getting an infection later that is antibiotic-resistant. Antibiotics can also cause severe cases of diarrhea that require other antibiotics to treat.  It is important that you accept your healthcare provider's opinion to not use antibiotics. Your provider will prescribe antibiotics if the infection is caused by bacteria. If they are prescribed:  · Take all of the medicine. Take the medicine until it is used up,  even if symptoms have improved. If you dont, the bronchitis may come back.  · Take the medicines as directed. For instance, some medicines should be taken with food.  · Ask about side effects. Ask your provider or pharmacist what side effects are common, and what to do about them.  Follow-up care  You should see your provider again in 2 to 3 weeks. By this time, symptoms should have improved. An infection that lasts longer may mean you have a more serious problem.  Prevention  · Avoid tobacco smoke. If you smoke, quit. Stay away from smoky places. Ask friends and family not to smoke around you, or in your home or car.  · Get checked for allergies.  · Ask your provider about getting a yearly flu shot. Also ask about pneumococcal or pneumonia shots.  · Wash your hands often. This helps reduce the chance of picking up viruses that cause colds and flu.  Call your healthcare provider if:  · Symptoms worsen, or you have new symptoms  · Breathing problems worsen or  become severe  · Symptoms dont get better within a week, or within 3 days of taking antibiotics   Date Last Reviewed: 2/1/2017  © 9118-5340 [employer]. [address] [place] [address]. All rights reserved. This information is not intended as a substitute for professional medical care. Always follow your healthcare professional's instructions.

## 2018-12-17 ENCOUNTER — TELEPHONE (OUTPATIENT)
Dept: URGENT CARE | Facility: CLINIC | Age: 73
End: 2018-12-17

## 2018-12-26 ENCOUNTER — OFFICE VISIT (OUTPATIENT)
Dept: URGENT CARE | Facility: CLINIC | Age: 73
End: 2018-12-26
Payer: MEDICARE

## 2018-12-26 VITALS
TEMPERATURE: 98 F | RESPIRATION RATE: 16 BRPM | WEIGHT: 180 LBS | OXYGEN SATURATION: 96 % | HEIGHT: 64 IN | HEART RATE: 78 BPM | DIASTOLIC BLOOD PRESSURE: 91 MMHG | BODY MASS INDEX: 30.73 KG/M2 | SYSTOLIC BLOOD PRESSURE: 147 MMHG

## 2018-12-26 DIAGNOSIS — S61.511A LACERATION OF SKIN OF RIGHT WRIST, INITIAL ENCOUNTER: Primary | ICD-10-CM

## 2018-12-26 PROCEDURE — 99214 OFFICE O/P EST MOD 30 MIN: CPT | Mod: 25,S$GLB,, | Performed by: PHYSICIAN ASSISTANT

## 2018-12-26 PROCEDURE — 12041 INTMD RPR N-HF/GENIT 2.5CM/<: CPT | Mod: S$GLB,,, | Performed by: PHYSICIAN ASSISTANT

## 2018-12-26 RX ORDER — ANASTROZOLE 1 MG/1
TABLET ORAL
Refills: 3 | COMMUNITY
Start: 2018-11-07 | End: 2019-09-20 | Stop reason: ALTCHOICE

## 2018-12-26 NOTE — PATIENT INSTRUCTIONS
- Rest.    - Drink plenty of fluids.    - Tylenol or Ibuprofen as directed as needed for pain.    - Keep skin glue on the laceration until it falls off. Do not pick it off. Do not use ointment or soak the laceration, as this may cause skin glue to dissolve. See skin glue handout  - If you develop any signs of infection, seek medical attention immediately- these may include red streaking up your arm, increased swelling and redness, discharge from wound, fever.   - Follow up with your PCP or specialty clinic as directed in the next 1-2 weeks if not improved or as needed.  You can call [phone] to schedule an appointment with the appropriate provider.    - Go to the ED if your symptoms worsen.    - You must understand that you have received an Urgent Care treatment only and that you may be released before all of your medical problems are known or treated.   - You, the patient, will arrange for follow up care as instructed.   - If your condition worsens or fails to improve we recommend that you receive another evaluation at the ER immediately or contact your PCP to discuss your concerns or return here.         Laceration, Extremity: Skin Glue  A laceration is a cut through the skin. You have a laceration that has been closed with skin glue. This is used on cuts that have smooth edges and are not infected.   You may need a tetanus shot. This is given if you have no record of a shot, and the object that caused the cut may lead to tetanus.  Home Care  · Your healthcare provider may prescribe an antibiotic. This is to help prevent infection. Follow all instructions for taking this medicine. Take the medicine every day until it is gone or you are told to stop. You should not have any left over.  · The healthcare provider may prescribe medicines for pain. Follow instructions for taking them.  · Follow the healthcare providers instructions on how to care for the cut.  · No bandage is needed. Skin glue peels off on its  own within 5 to 10 days. Most skin wounds heal within 10 days.  · Keep the wound clean and dry. You may shower or bathe as usual, but do not use soaps, lotions, or ointments on the wound area. Do not scrub the wound. After bathing, pat the wound dry with a soft towel.  · Do not scratch, rub, or pick at the film. Do not place tape directly over the film.  · Do not apply liquids (such as peroxide), ointments, or creams to the wound while the skin glue is in place.  · Avoid activities that may reinjure your wound.  · Avoid activities that cause heavy sweating. Protect the wound from sunlight.  · Most skin wounds heal without problems. However, an infection sometimes occurs despite proper treatment. Therefore, watch for the signs of infection listed below.  Follow-up care  Follow up as directed with your healthcare provider or our staff.  When to seek medical advice  Call your health care provider right away if any of these occur:  · Wound bleeding not controlled by direct pressure  · Signs of infection, including increasing pain in the wound, increasing wound redness or swelling, or pus or bad odor coming from the wound  · Fever of 100.4°F (38.ºC) or higher or as directed by your healthcare provider  · Wound edges re-open  · Wound changes colors  · Numbness around the wound   · Decreased movement around the injured area  Date Last Reviewed: 6/14/2015  © 1417-8232 [employer]. [address] [place] [address], [place] [address]. All rights reserved. This information is not intended as a substitute for professional medical care. Always follow your healthcare professional's instructions.

## 2018-12-26 NOTE — PROGRESS NOTES
"Subjective:       Patient ID: Loni Heard is a 73 y.o. female.    Vitals:  height is 5' 4" (1.626 m) and weight is 81.6 kg (180 lb). Her temperature is 97.7 °F (36.5 °C). Her blood pressure is 147/91 (abnormal) and her pulse is 78. Her respiration is 16 and oxygen saturation is 96%.     Chief Complaint: Laceration    Pt presents with laceration of the right anterior wrist that happened 45 minutes ago.  The glass fell out of the overhead cabinet and hit the stove top and broke it, the patient then tried to catch the glass because she did not know it was broken already.  The glass cut the anterior side of her right wrist and there is a small laceration of her right wrist with minimal bleeding.  Patient states her last tetanus shot was 2 years ago. The laceration is no longer bleeding.  She has full range of motion and function of all of her fingers and her hand and wrist.  Denies numbness, paresthesia, weakness of her hand.      Laceration    The incident occurred less than 1 hour ago. The laceration is located on the right arm. The laceration is 2 cm in size. The laceration mechanism was a broken glass. The pain is at a severity of 4/10. The pain is mild. The pain has been constant since onset. It is unknown if a foreign body is present. Her tetanus status is UTD.       Constitution: Negative for fatigue.   HENT: Negative for facial swelling and facial trauma.    Neck: Negative for neck stiffness.   Cardiovascular: Negative for chest trauma.   Eyes: Negative for eye trauma, double vision and blurred vision.   Gastrointestinal: Negative for abdominal trauma, abdominal pain and rectal bleeding.   Genitourinary: Negative for hematuria, missed menses, genital trauma and pelvic pain.   Musculoskeletal: Positive for pain and trauma. Negative for joint swelling and abnormal ROM of joint.   Skin: Positive for laceration. Negative for color change, wound, abrasion and erythema.   Neurological: Negative for " dizziness, history of vertigo, light-headedness, coordination disturbances, altered mental status and loss of consciousness.   Hematologic/Lymphatic: Negative for history of bleeding disorder.   Psychiatric/Behavioral: Negative for altered mental status.       Objective:      Physical Exam   Constitutional: She is oriented to person, place, and time. She appears well-developed and well-nourished.   HENT:   Head: Normocephalic and atraumatic. Head is without abrasion, without contusion and without laceration.   Right Ear: External ear normal.   Left Ear: External ear normal.   Nose: Nose normal.   Mouth/Throat: Oropharynx is clear and moist.   Eyes: Conjunctivae, EOM and lids are normal. Pupils are equal, round, and reactive to light.   Neck: Trachea normal, full passive range of motion without pain and phonation normal. Neck supple.   Cardiovascular: Normal rate, regular rhythm and normal heart sounds.   Pulmonary/Chest: Effort normal and breath sounds normal. No stridor. No respiratory distress.   Musculoskeletal: Normal range of motion.        Right wrist: She exhibits laceration. She exhibits normal range of motion, no tenderness, no bony tenderness, no swelling, no effusion, no crepitus and no deformity.        Right hand: Normal. She exhibits normal range of motion, no tenderness, no bony tenderness, normal capillary refill, no deformity, no laceration and no swelling. Normal sensation noted. Normal strength noted.        Hands:  Neurological: She is alert and oriented to person, place, and time.   Skin: Skin is warm, dry and intact. Capillary refill takes less than 2 seconds. No abrasion, no bruising, no burn, no ecchymosis, no laceration, no lesion and no rash noted. No erythema.   Psychiatric: She has a normal mood and affect. Her speech is normal and behavior is normal. Judgment and thought content normal. Cognition and memory are normal.   Nursing note and vitals reviewed.                Assessment:        1. Laceration of skin of right wrist, initial encounter        Plan:         Laceration of skin of right wrist, initial encounter  -     Laceration Repair      Patient Instructions   - Rest.    - Drink plenty of fluids.    - Tylenol or Ibuprofen as directed as needed for pain.    - Keep skin glue on the laceration until it falls off. Do not pick it off. Do not use ointment or soak the laceration, as this may cause skin glue to dissolve. See skin glue handout  - If you develop any signs of infection, seek medical attention immediately- these may include red streaking up your arm, increased swelling and redness, discharge from wound, fever.   - Follow up with your PCP or specialty clinic as directed in the next 1-2 weeks if not improved or as needed.  You can call [phone] to schedule an appointment with the appropriate provider.    - Go to the ED if your symptoms worsen.    - You must understand that you have received an Urgent Care treatment only and that you may be released before all of your medical problems are known or treated.   - You, the patient, will arrange for follow up care as instructed.   - If your condition worsens or fails to improve we recommend that you receive another evaluation at the ER immediately or contact your PCP to discuss your concerns or return here.         Laceration, Extremity: Skin Glue  A laceration is a cut through the skin. You have a laceration that has been closed with skin glue. This is used on cuts that have smooth edges and are not infected.   You may need a tetanus shot. This is given if you have no record of a shot, and the object that caused the cut may lead to tetanus.  Home Care  · Your healthcare provider may prescribe an antibiotic. This is to help prevent infection. Follow all instructions for taking this medicine. Take the medicine every day until it is gone or you are told to stop. You should not have any left over.  · The healthcare provider may prescribe  medicines for pain. Follow instructions for taking them.  · Follow the healthcare providers instructions on how to care for the cut.  · No bandage is needed. Skin glue peels off on its own within 5 to 10 days. Most skin wounds heal within 10 days.  · Keep the wound clean and dry. You may shower or bathe as usual, but do not use soaps, lotions, or ointments on the wound area. Do not scrub the wound. After bathing, pat the wound dry with a soft towel.  · Do not scratch, rub, or pick at the film. Do not place tape directly over the film.  · Do not apply liquids (such as peroxide), ointments, or creams to the wound while the skin glue is in place.  · Avoid activities that may reinjure your wound.  · Avoid activities that cause heavy sweating. Protect the wound from sunlight.  · Most skin wounds heal without problems. However, an infection sometimes occurs despite proper treatment. Therefore, watch for the signs of infection listed below.  Follow-up care  Follow up as directed with your healthcare provider or our staff.  When to seek medical advice  Call your health care provider right away if any of these occur:  · Wound bleeding not controlled by direct pressure  · Signs of infection, including increasing pain in the wound, increasing wound redness or swelling, or pus or bad odor coming from the wound  · Fever of 100.4°F (38.ºC) or higher or as directed by your healthcare provider  · Wound edges re-open  · Wound changes colors  · Numbness around the wound   · Decreased movement around the injured area  Date Last Reviewed: 6/14/2015  © 8533-4108 [employer]. [address] [place] [address], [place] [address]. All rights reserved. This information is not intended as a substitute for professional medical care. Always follow your healthcare professional's instructions.

## 2018-12-26 NOTE — PROCEDURES
Laceration Repair  Date/Time: 12/26/2018 11:50 AM  Performed by: Rosalino Díaz PA-C  Authorized by: Rosalino Díaz PA-C   Consent Done: Yes  Consent: Verbal consent obtained.  Consent given by: patient  Patient understanding: patient states understanding of the procedure being performed  Patient identity confirmed: verbally with patient  Body area: upper extremity  Location details: right wrist  Laceration length: 1.5 cm  Foreign bodies: unknown  Tendon involvement: none  Nerve involvement: none  Vascular damage: no  Anesthesia: local infiltration    Anesthesia:  Local Anesthetic: lidocaine 1% without epinephrine  Anesthetic total: 2 mL  Patient sedated: no  Irrigation solution: saline  Irrigation method: syringe  Amount of cleaning: extensive  Debridement: none  Skin closure: glue  Approximation: close  Approximation difficulty: simple  Comments: Irrigated with 100 cc saline. Cleaned with hydrogen peroxide.

## 2019-02-04 ENCOUNTER — OFFICE VISIT (OUTPATIENT)
Dept: ORTHOPEDICS | Facility: CLINIC | Age: 74
End: 2019-02-04
Payer: MEDICARE

## 2019-02-04 ENCOUNTER — HOSPITAL ENCOUNTER (OUTPATIENT)
Dept: RADIOLOGY | Facility: HOSPITAL | Age: 74
Discharge: HOME | End: 2019-02-04
Attending: ORTHOPAEDIC SURGERY
Payer: MEDICARE

## 2019-02-04 VITALS — HEIGHT: 64 IN | WEIGHT: 179.13 LBS | BODY MASS INDEX: 30.58 KG/M2

## 2019-02-04 DIAGNOSIS — G57.61 MORTON'S NEUROMA OF RIGHT FOOT: ICD-10-CM

## 2019-02-04 DIAGNOSIS — M79.671 RIGHT FOOT PAIN: ICD-10-CM

## 2019-02-04 DIAGNOSIS — M79.671 RIGHT FOOT PAIN: Primary | ICD-10-CM

## 2019-02-04 PROCEDURE — 99999 PR PBB SHADOW E&M-EST. PATIENT-LVL III: CPT | Mod: PBBFAC,,, | Performed by: ORTHOPAEDIC SURGERY

## 2019-02-04 PROCEDURE — 64455 PR INJECT ANES/STEROID PLANTAR COMMON DIGITAL NERVE: ICD-10-PCS | Mod: S$PBB,T7,, | Performed by: ORTHOPAEDIC SURGERY

## 2019-02-04 PROCEDURE — 99213 PR OFFICE/OUTPT VISIT, EST, LEVL III, 20-29 MIN: ICD-10-PCS | Mod: 25,S$PBB,, | Performed by: ORTHOPAEDIC SURGERY

## 2019-02-04 PROCEDURE — 64455 NJX AA&/STRD PLTR COM DG NRV: CPT | Mod: S$PBB,T7,, | Performed by: ORTHOPAEDIC SURGERY

## 2019-02-04 PROCEDURE — 73630 X-RAY EXAM OF FOOT: CPT | Mod: 26,RT,, | Performed by: RADIOLOGY

## 2019-02-04 PROCEDURE — 99213 OFFICE O/P EST LOW 20 MIN: CPT | Mod: PBBFAC,25 | Performed by: ORTHOPAEDIC SURGERY

## 2019-02-04 PROCEDURE — 99213 OFFICE O/P EST LOW 20 MIN: CPT | Mod: 25,S$PBB,, | Performed by: ORTHOPAEDIC SURGERY

## 2019-02-04 PROCEDURE — 73630 X-RAY EXAM OF FOOT: CPT | Mod: TC,RT

## 2019-02-04 PROCEDURE — 99999 PR PBB SHADOW E&M-EST. PATIENT-LVL III: ICD-10-PCS | Mod: PBBFAC,,, | Performed by: ORTHOPAEDIC SURGERY

## 2019-02-04 PROCEDURE — 64455 NJX AA&/STRD PLTR COM DG NRV: CPT | Mod: PBBFAC | Performed by: ORTHOPAEDIC SURGERY

## 2019-02-04 PROCEDURE — 73630 XR FOOT COMPLETE 3 VIEW RIGHT: ICD-10-PCS | Mod: 26,RT,, | Performed by: RADIOLOGY

## 2019-02-04 RX ORDER — HYOSCYAMINE SULFATE 0.12 MG/1
TABLET SUBLINGUAL
Refills: 3 | COMMUNITY
Start: 2019-01-24 | End: 2019-09-17

## 2019-02-04 RX ORDER — FLUORIDE (SODIUM) 1.1 %
PASTE (ML) DENTAL
Refills: 10 | COMMUNITY
Start: 2019-01-04 | End: 2020-09-16

## 2019-02-06 RX ORDER — METHYLPREDNISOLONE ACETATE 80 MG/ML
80 INJECTION, SUSPENSION INTRA-ARTICULAR; INTRALESIONAL; INTRAMUSCULAR; SOFT TISSUE
Status: COMPLETED | OUTPATIENT
Start: 2019-02-06 | End: 2019-02-06

## 2019-02-06 RX ADMIN — METHYLPREDNISOLONE ACETATE 80 MG: 80 INJECTION, SUSPENSION INTRA-ARTICULAR; INTRALESIONAL; INTRAMUSCULAR; SOFT TISSUE at 10:02

## 2019-02-07 ENCOUNTER — OFFICE VISIT (OUTPATIENT)
Dept: ORTHOPEDICS | Facility: CLINIC | Age: 74
End: 2019-02-07
Payer: MEDICARE

## 2019-02-07 VITALS
DIASTOLIC BLOOD PRESSURE: 83 MMHG | BODY MASS INDEX: 30.35 KG/M2 | HEART RATE: 70 BPM | WEIGHT: 176.81 LBS | SYSTOLIC BLOOD PRESSURE: 123 MMHG

## 2019-02-07 DIAGNOSIS — G57.61 MORTON'S NEUROMA OF RIGHT FOOT: Primary | ICD-10-CM

## 2019-02-07 PROCEDURE — 99999 PR PBB SHADOW E&M-EST. PATIENT-LVL III: CPT | Mod: PBBFAC,,, | Performed by: ORTHOPAEDIC SURGERY

## 2019-02-07 PROCEDURE — 64455 NJX AA&/STRD PLTR COM DG NRV: CPT | Mod: PBBFAC | Performed by: ORTHOPAEDIC SURGERY

## 2019-02-07 PROCEDURE — 64455 NJX AA&/STRD PLTR COM DG NRV: CPT | Mod: S$PBB,RT,, | Performed by: ORTHOPAEDIC SURGERY

## 2019-02-07 PROCEDURE — 99213 OFFICE O/P EST LOW 20 MIN: CPT | Mod: 25,S$PBB,, | Performed by: ORTHOPAEDIC SURGERY

## 2019-02-07 PROCEDURE — 99999 PR PBB SHADOW E&M-EST. PATIENT-LVL III: ICD-10-PCS | Mod: PBBFAC,,, | Performed by: ORTHOPAEDIC SURGERY

## 2019-02-07 PROCEDURE — 99213 OFFICE O/P EST LOW 20 MIN: CPT | Mod: PBBFAC,25 | Performed by: ORTHOPAEDIC SURGERY

## 2019-02-07 PROCEDURE — 64455 PR INJECT ANES/STEROID PLANTAR COMMON DIGITAL NERVE: ICD-10-PCS | Mod: S$PBB,RT,, | Performed by: ORTHOPAEDIC SURGERY

## 2019-02-07 PROCEDURE — 99213 PR OFFICE/OUTPT VISIT, EST, LEVL III, 20-29 MIN: ICD-10-PCS | Mod: 25,S$PBB,, | Performed by: ORTHOPAEDIC SURGERY

## 2019-02-07 RX ORDER — METHYLPREDNISOLONE ACETATE 80 MG/ML
80 INJECTION, SUSPENSION INTRA-ARTICULAR; INTRALESIONAL; INTRAMUSCULAR; SOFT TISSUE
Status: COMPLETED | OUTPATIENT
Start: 2019-02-07 | End: 2019-02-07

## 2019-02-07 RX ADMIN — METHYLPREDNISOLONE ACETATE 80 MG: 80 INJECTION, SUSPENSION INTRA-ARTICULAR; INTRALESIONAL; INTRAMUSCULAR; SOFT TISSUE at 07:02

## 2019-02-07 NOTE — PROGRESS NOTES
Loni Heard  Returns today for follow-up.  This is a 73-year-old female who I just saw 3 days ago.  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  I suspected that she may have a recurrent neuroma of the right 3rd space and I gave her an injection in her right 3rd space 3 days ago.  She reports that she did okay with the injection but when she spent a lot of time on her foot 2 days ago she had severe pain that mainly radiated into her 2nd toe suggesting that she might have a neuroma of the 2nd space instead of a recurrent neuroma of the 3rd space. She states she is not having any pain in her 3rd toe as she was prior to Monday's injection  She comes in today for re-evaluation.    Examination:  Right foot does reveal tenderness in the 2nd intermetatarsal space.    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 I recommended doing a another diagnostic injection which she agreed to.  After verbal consent sterile prep I injected the right 2nd intermetatarsal space with 2 cc of lidocaine and 80 mg of Depo-Medrol.    She will follow up with her previously scheduled appointment in about 3 weeks

## 2019-02-07 NOTE — PROGRESS NOTES
Ms. Heard returns today.  This 73-year-old female who I saw almost two months   ago.  She had presented with a chief complaint of right third toe pain with a   history significant for previous Vyas's neurectomy of her right third space   about 15 years ago.  She had presented a couple of months ago with progressive   two-year history of recurring pain in her third toe.  My suspicion initially was   that she may have a recurrent neuroma of the right third space.  We elected to   observe this and she returns today reporting continued symptoms.  Her pain is   mainly in the third toe, but she does have tenderness in the third   intermetatarsal space.  Her symptoms are more neurogenic in nature.  I did order   an x-ray today and there are no obvious abnormalities.    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    RECOMMENDATIONS:  Treatment options were discussed with Ms. Heard.  Her   symptoms are fairly intermittent and currently she is essentially asymptomatic.    We had discussed possibly doing an injection since she is not hurting right now   it may not be helpful from a diagnostic standpoint, but might be helpful from a   therapeutic standpoint.  She agreed to this.  So after verbal consent and   sterile prep, I injected her right foot third intermetatarsal space with 1 mL of   lidocaine and 80 mg of Depo-Medrol.  She is going to monitor her symptoms over   the next eight weeks.  I will have her make a return appointment at that time.      TIFFANY  dd: 02/06/2019 10:43:06 (CST)  td: 02/07/2019 09:20:20 (CST)  Doc ID   #[id number]  Job ID #[id number]    CC:

## 2019-02-27 ENCOUNTER — TELEPHONE (OUTPATIENT)
Dept: INTERNAL MEDICINE | Facility: CLINIC | Age: 74
End: 2019-02-27

## 2019-02-27 NOTE — TELEPHONE ENCOUNTER
Outlying egd/mga dr.brett siddiqui egd erythema   Gastritis /polyps no dysplaisa   negative h.pylori

## 2019-04-01 ENCOUNTER — OFFICE VISIT (OUTPATIENT)
Dept: ORTHOPEDICS | Facility: CLINIC | Age: 74
End: 2019-04-01
Payer: MEDICARE

## 2019-04-01 DIAGNOSIS — G57.61 MORTON'S NEUROMA OF RIGHT FOOT: ICD-10-CM

## 2019-04-01 DIAGNOSIS — M77.41 METATARSALGIA OF RIGHT FOOT: Primary | ICD-10-CM

## 2019-04-01 PROCEDURE — 99212 OFFICE O/P EST SF 10 MIN: CPT | Mod: PBBFAC | Performed by: ORTHOPAEDIC SURGERY

## 2019-04-01 PROCEDURE — 99999 PR PBB SHADOW E&M-EST. PATIENT-LVL II: ICD-10-PCS | Mod: PBBFAC,,, | Performed by: ORTHOPAEDIC SURGERY

## 2019-04-01 PROCEDURE — 99213 OFFICE O/P EST LOW 20 MIN: CPT | Mod: S$PBB,,, | Performed by: ORTHOPAEDIC SURGERY

## 2019-04-01 PROCEDURE — 99213 PR OFFICE/OUTPT VISIT, EST, LEVL III, 20-29 MIN: ICD-10-PCS | Mod: S$PBB,,, | Performed by: ORTHOPAEDIC SURGERY

## 2019-04-01 PROCEDURE — 99999 PR PBB SHADOW E&M-EST. PATIENT-LVL II: CPT | Mod: PBBFAC,,, | Performed by: ORTHOPAEDIC SURGERY

## 2019-04-03 ENCOUNTER — LAB VISIT (OUTPATIENT)
Dept: LAB | Facility: HOSPITAL | Age: 74
End: 2019-04-03
Attending: INTERNAL MEDICINE
Payer: MEDICARE

## 2019-04-03 DIAGNOSIS — E55.9 VITAMIN D DEFICIENCY: ICD-10-CM

## 2019-04-03 DIAGNOSIS — E11.9 TYPE 2 DIABETES MELLITUS WITHOUT COMPLICATION, WITHOUT LONG-TERM CURRENT USE OF INSULIN: ICD-10-CM

## 2019-04-03 LAB
25(OH)D3+25(OH)D2 SERPL-MCNC: 38 NG/ML (ref 30–96)
ALBUMIN SERPL BCP-MCNC: 4.2 G/DL (ref 3.5–5.2)
ALP SERPL-CCNC: 64 U/L (ref 55–135)
ALT SERPL W/O P-5'-P-CCNC: 24 U/L (ref 10–44)
ANION GAP SERPL CALC-SCNC: 5 MMOL/L (ref 8–16)
AST SERPL-CCNC: 22 U/L (ref 10–40)
BASOPHILS # BLD AUTO: 0.02 K/UL (ref 0–0.2)
BASOPHILS NFR BLD: 0.3 % (ref 0–1.9)
BILIRUB DIRECT SERPL-MCNC: 0.2 MG/DL (ref 0.1–0.3)
BILIRUB SERPL-MCNC: 0.5 MG/DL (ref 0.1–1)
BUN SERPL-MCNC: 14 MG/DL (ref 8–23)
CALCIUM SERPL-MCNC: 10 MG/DL (ref 8.7–10.5)
CHLORIDE SERPL-SCNC: 105 MMOL/L (ref 95–110)
CO2 SERPL-SCNC: 31 MMOL/L (ref 23–29)
CREAT SERPL-MCNC: 0.9 MG/DL (ref 0.5–1.4)
DIFFERENTIAL METHOD: NORMAL
EOSINOPHIL # BLD AUTO: 0.1 K/UL (ref 0–0.5)
EOSINOPHIL NFR BLD: 2.3 % (ref 0–8)
ERYTHROCYTE [DISTWIDTH] IN BLOOD BY AUTOMATED COUNT: 13.6 % (ref 11.5–14.5)
EST. GFR  (AFRICAN AMERICAN): >60 ML/MIN/1.73 M^2
EST. GFR  (NON AFRICAN AMERICAN): >60 ML/MIN/1.73 M^2
ESTIMATED AVG GLUCOSE: 143 MG/DL (ref 68–131)
GLUCOSE SERPL-MCNC: 122 MG/DL (ref 70–110)
HBA1C MFR BLD HPLC: 6.6 % (ref 4–5.6)
HCT VFR BLD AUTO: 40.5 % (ref 37–48.5)
HGB BLD-MCNC: 13.1 G/DL (ref 12–16)
LYMPHOCYTES # BLD AUTO: 1.7 K/UL (ref 1–4.8)
LYMPHOCYTES NFR BLD: 27.7 % (ref 18–48)
MCH RBC QN AUTO: 29.4 PG (ref 27–31)
MCHC RBC AUTO-ENTMCNC: 32.3 G/DL (ref 32–36)
MCV RBC AUTO: 91 FL (ref 82–98)
MONOCYTES # BLD AUTO: 0.7 K/UL (ref 0.3–1)
MONOCYTES NFR BLD: 10.9 % (ref 4–15)
NEUTROPHILS # BLD AUTO: 3.6 K/UL (ref 1.8–7.7)
NEUTROPHILS NFR BLD: 58.6 % (ref 38–73)
PLATELET # BLD AUTO: 223 K/UL (ref 150–350)
PMV BLD AUTO: 10.2 FL (ref 9.2–12.9)
POTASSIUM SERPL-SCNC: 5 MMOL/L (ref 3.5–5.1)
PROT SERPL-MCNC: 7.1 G/DL (ref 6–8.4)
RBC # BLD AUTO: 4.45 M/UL (ref 4–5.4)
SODIUM SERPL-SCNC: 141 MMOL/L (ref 136–145)
WBC # BLD AUTO: 6.17 K/UL (ref 3.9–12.7)

## 2019-04-03 PROCEDURE — 36415 COLL VENOUS BLD VENIPUNCTURE: CPT

## 2019-04-03 PROCEDURE — 80076 HEPATIC FUNCTION PANEL: CPT

## 2019-04-03 PROCEDURE — 83036 HEMOGLOBIN GLYCOSYLATED A1C: CPT

## 2019-04-03 PROCEDURE — 82306 VITAMIN D 25 HYDROXY: CPT

## 2019-04-03 PROCEDURE — 80048 BASIC METABOLIC PNL TOTAL CA: CPT

## 2019-04-03 PROCEDURE — 85025 COMPLETE CBC W/AUTO DIFF WBC: CPT

## 2019-04-05 NOTE — PROGRESS NOTES
Loni Heard  Returns today for follow-up.  This is a 73-year-old female with metatarsalgia and suspected Vyas's neuroma.  About 6 weeks ago I performed a injection of her right 3rd intermetatarsal space for possible recurrent neuroma after a previous Vyas's neurectomy.  She did not get any significant relief with that injection and came in a couple of days later with pain radiating to her 2nd toe so I also performed an injection of her 2nd intermetatarsal space and she returns today for follow-up.  She reports that the 2nd injection in the 2nd space did not give her any relief at all.    Examination:  Right foot reveals continued tenderness in the metatarsal head region related to the 2nd and 3rd toes.    Impression:  Metatarsalgia right foot, etiology unknown    Recommendation:  This patient has had previous Vyas's neurectomy of her right 3rd space about 15 years ago and presented to me with recurrent pain.  I have done an injection of the 3rd intermetatarsal space and the 2nd intermetatarsal space in the last 6 weeks and she has not had any relief.  Plain x-rays have not shown any obvious bony abnormality.  Before making any further recommendations I would like to obtain an MRI to rule out any other structural abnormalities.    Follow-up the results of MRI

## 2019-04-10 ENCOUNTER — HOSPITAL ENCOUNTER (OUTPATIENT)
Dept: RADIOLOGY | Facility: HOSPITAL | Age: 74
Discharge: HOME | End: 2019-04-10
Attending: ORTHOPAEDIC SURGERY
Payer: MEDICARE

## 2019-04-10 ENCOUNTER — OFFICE VISIT (OUTPATIENT)
Dept: INTERNAL MEDICINE | Facility: CLINIC | Age: 74
End: 2019-04-10
Payer: MEDICARE

## 2019-04-10 VITALS
BODY MASS INDEX: 30.83 KG/M2 | WEIGHT: 180.56 LBS | SYSTOLIC BLOOD PRESSURE: 124 MMHG | HEART RATE: 67 BPM | HEIGHT: 64 IN | DIASTOLIC BLOOD PRESSURE: 64 MMHG

## 2019-04-10 DIAGNOSIS — E11.9 TYPE 2 DIABETES MELLITUS WITHOUT COMPLICATION, WITHOUT LONG-TERM CURRENT USE OF INSULIN: Primary | ICD-10-CM

## 2019-04-10 DIAGNOSIS — M77.41 METATARSALGIA OF RIGHT FOOT: ICD-10-CM

## 2019-04-10 DIAGNOSIS — E78.49 OTHER HYPERLIPIDEMIA: ICD-10-CM

## 2019-04-10 DIAGNOSIS — R10.9 ABDOMINAL PAIN, UNSPECIFIED ABDOMINAL LOCATION: ICD-10-CM

## 2019-04-10 DIAGNOSIS — G57.61 MORTON'S NEUROMA OF RIGHT FOOT: ICD-10-CM

## 2019-04-10 DIAGNOSIS — E55.9 VITAMIN D DEFICIENCY: ICD-10-CM

## 2019-04-10 DIAGNOSIS — Z85.3 HX: BREAST CANCER: ICD-10-CM

## 2019-04-10 PROCEDURE — 99214 PR OFFICE/OUTPT VISIT, EST, LEVL IV, 30-39 MIN: ICD-10-PCS | Mod: S$PBB,,, | Performed by: INTERNAL MEDICINE

## 2019-04-10 PROCEDURE — 99213 OFFICE O/P EST LOW 20 MIN: CPT | Mod: PBBFAC,25 | Performed by: INTERNAL MEDICINE

## 2019-04-10 PROCEDURE — 73718 MRI LOWER EXTREMITY W/O DYE: CPT | Mod: TC,RT

## 2019-04-10 PROCEDURE — 99999 PR PBB SHADOW E&M-EST. PATIENT-LVL III: CPT | Mod: PBBFAC,,, | Performed by: INTERNAL MEDICINE

## 2019-04-10 PROCEDURE — 73718 MRI FOOT (FOREFOOT) RIGHT WITHOUT CONTRAST: ICD-10-PCS | Mod: 26,RT,, | Performed by: RADIOLOGY

## 2019-04-10 PROCEDURE — 99999 PR PBB SHADOW E&M-EST. PATIENT-LVL III: ICD-10-PCS | Mod: PBBFAC,,, | Performed by: INTERNAL MEDICINE

## 2019-04-10 PROCEDURE — 99214 OFFICE O/P EST MOD 30 MIN: CPT | Mod: S$PBB,,, | Performed by: INTERNAL MEDICINE

## 2019-04-10 PROCEDURE — 73718 MRI LOWER EXTREMITY W/O DYE: CPT | Mod: 26,RT,, | Performed by: RADIOLOGY

## 2019-04-10 NOTE — PROGRESS NOTES
Answers for HPI/ROS submitted by the patient on 4/5/2019   activity change: Yes  unexpected weight change: No  neck pain: No  hearing loss: No  rhinorrhea: No  trouble swallowing: No  eye discharge: No  visual disturbance: No  chest tightness: No  wheezing: No  chest pain: No  palpitations: No  blood in stool: No  constipation: No  vomiting: No  diarrhea: No  polydipsia: No  polyuria: No  difficulty urinating: No  hematuria: No  menstrual problem: No  dysuria: No  joint swelling: No  arthralgias: No  headaches: No  weakness: No  confusion: No  dysphoric mood: No  Subjective:       Patient ID: Loni Heard is a 73 y.o. female.    Chief Complaint: Follow-up   This is a 73 year presents today for follow-up she continues to do well with exercise and has been doing a bit more weight training since last visit she has been doing less swimming overall but still tries to stay active she has been having some discomfort with her foot and has an appointment with dr. Pond.  She had previous neuroma surgery in the past and has recurrant discomfort .  She has completed her breast cancer treatment   Including surgery and radiation and remains on arimidex from her hematologist at [facility]   She reports they discussed medication for her bone strength and consider starting prolia but she has declined at this time.   She aslso went to see [facility] gi dr. siddiqui since she was having disocmfort left side intermittant. She  had egd showed some mild gastritis and reports some additional blood testing Recommended  . She still feels an area of discomfort to palpation at times  She had blood work prior to this visit has been taking 2000 vit d and getting outdoors a bit more.    HPI  Review of Systems   Constitutional: Positive for activity change. Negative for unexpected weight change.   HENT: Negative for hearing loss, rhinorrhea and trouble swallowing.    Eyes: Negative for discharge and visual disturbance.   Respiratory: Negative  "for chest tightness and wheezing.    Cardiovascular: Negative for chest pain and palpitations.   Gastrointestinal: Negative for blood in stool, constipation, diarrhea and vomiting.        Feels discomfort left abdomin at times  Lump possible hernia/fatty lump    Endocrine: Negative for polydipsia and polyuria.   Genitourinary: Negative for difficulty urinating, dysuria, hematuria and menstrual problem.   Musculoskeletal: Negative for arthralgias, joint swelling and neck pain.        Right foot discomfort    Neurological: Negative for weakness and headaches.   Psychiatric/Behavioral: Negative for confusion and dysphoric mood.       Objective:     Blood pressure 124/64, pulse 67, height 5' 4" (1.626 m), weight 81.9 kg (180 lb 8.9 oz).    Physical Exam   Constitutional: No distress.   HENT:   Head: Normocephalic.   Mouth/Throat: Oropharynx is clear and moist.   Eyes: No scleral icterus.   Neck: Neck supple.   Cardiovascular: Normal rate, regular rhythm and normal heart sounds. Exam reveals no gallop and no friction rub.   No murmur heard.  Pulmonary/Chest: Effort normal and breath sounds normal. No respiratory distress.   Left breast surgical changes    Abdominal: Soft. Bowel sounds are normal. She exhibits no mass. There is no tenderness.   Pt has internittant discomfort   Abdominal wall left side      Musculoskeletal: She exhibits no edema.   Decreased arch   Surgical changes right foot    Neurological: She is alert.   Skin: No erythema.   Psychiatric: She has a normal mood and affect.   Vitals reviewed.      Assessment:       1. Type 2 diabetes mellitus without complication, without long-term current use of insulin    2. Abdominal pain, unspecified abdominal location    3. Lump    4. Other hyperlipidemia    5. Vitamin D deficiency    6. HX: breast cancer        Plan:       Loni was seen today for follow-up.    Diagnoses and all orders for this visit:    Type 2 diabetes mellitus without complication, without " long-term current use of insulin  Remains on januiva   -     Basic metabolic panel; Future  -     Hemoglobin A1c; Future  -     Hepatic function panel; Future  -     Lipid panel; Future  -     TSH; Future    Abdominal pain, unspecified abdominal location  Lump   Pt notes left side abdomen   -     US Abdomen Limited_Hernia; Future      Other hyperlipidemia  Continue zetia     Vitamin D deficiency  Improved continue vitamin d     HX: breast cancer  Sp treatment she continues to follow with her hematologist and breast doctors  At [facility] currently on arimidex    Follow up 4 months

## 2019-04-12 ENCOUNTER — OFFICE VISIT (OUTPATIENT)
Dept: ORTHOPEDICS | Facility: CLINIC | Age: 74
End: 2019-04-12
Payer: MEDICARE

## 2019-04-12 DIAGNOSIS — G57.61 MORTON'S NEUROMA OF RIGHT FOOT: Primary | ICD-10-CM

## 2019-04-12 DIAGNOSIS — M77.41 METATARSALGIA OF RIGHT FOOT: ICD-10-CM

## 2019-04-12 PROCEDURE — 99213 OFFICE O/P EST LOW 20 MIN: CPT | Mod: S$PBB,,, | Performed by: ORTHOPAEDIC SURGERY

## 2019-04-12 PROCEDURE — 99999 PR PBB SHADOW E&M-EST. PATIENT-LVL I: ICD-10-PCS | Mod: PBBFAC,,, | Performed by: ORTHOPAEDIC SURGERY

## 2019-04-12 PROCEDURE — 99211 OFF/OP EST MAY X REQ PHY/QHP: CPT | Mod: PBBFAC | Performed by: ORTHOPAEDIC SURGERY

## 2019-04-12 PROCEDURE — 99999 PR PBB SHADOW E&M-EST. PATIENT-LVL I: CPT | Mod: PBBFAC,,, | Performed by: ORTHOPAEDIC SURGERY

## 2019-04-12 PROCEDURE — 99213 PR OFFICE/OUTPT VISIT, EST, LEVL III, 20-29 MIN: ICD-10-PCS | Mod: S$PBB,,, | Performed by: ORTHOPAEDIC SURGERY

## 2019-04-12 NOTE — PROGRESS NOTES
Loni Heard  Returns today for the results of her MRI.  This is a 73-year-old female who presented to me December with burning right 3rd toe pain and metatarsalgia.  She had a past surgical history significant for a Vyas's neurectomy from right 3rd space about 15 years ago and and did well until this recent onset of pain in her toe.  Since I have seen her I have performed to different injections. I  injected the right 3rd intermetatarsal space for suspected recurrent neuroma, and she reported minimal short-term and no long-term relief.  I then injected her right 2nd intermetatarsal space and she reported no relief from that injection. Because she was having continued symptoms that were improving I ordered an MRI.    MRI results:  MRI of the right forefoot reveals some nonspecific edema of the interosseous musculature around the 3rd intermetatarsal space. No other structural abnormalities were noted.    Impression:  Neurogenic pain right 3rd toe, possible recurrent neuroma    Recommendation:  I reassured her that structurally her foot is sound, but I do not have a definitive cause for her pain. I have discussed with therapy possibility of re-explore in the 3rd intermetatarsal space through a plantar incision to see if there is any identifiable nerve branches or recurrent neuroma from previous surgery. She understands that this would not guarantee relief of pain if I could not find a source for her pain.    She is going to think about the surgical option and will call if she decides to have it done

## 2019-04-17 ENCOUNTER — HOSPITAL ENCOUNTER (OUTPATIENT)
Dept: RADIOLOGY | Facility: HOSPITAL | Age: 74
Discharge: HOME | End: 2019-04-17
Attending: INTERNAL MEDICINE
Payer: MEDICARE

## 2019-04-17 ENCOUNTER — TELEPHONE (OUTPATIENT)
Dept: INTERNAL MEDICINE | Facility: CLINIC | Age: 74
End: 2019-04-17

## 2019-04-17 DIAGNOSIS — R10.9 ABDOMINAL PAIN, UNSPECIFIED ABDOMINAL LOCATION: ICD-10-CM

## 2019-04-17 PROCEDURE — 76705 US ABDOMEN LIMITED_HERNIA: ICD-10-PCS | Mod: 26,,, | Performed by: RADIOLOGY

## 2019-04-17 PROCEDURE — 76705 ECHO EXAM OF ABDOMEN: CPT | Mod: 26,,, | Performed by: RADIOLOGY

## 2019-04-17 PROCEDURE — 76705 ECHO EXAM OF ABDOMEN: CPT | Mod: TC

## 2019-04-17 RX ORDER — SITAGLIPTIN 50 MG/1
TABLET, FILM COATED ORAL
Qty: 30 TABLET | Refills: 6 | Status: SHIPPED | OUTPATIENT
Start: 2019-04-17 | End: 2019-10-09 | Stop reason: ALTCHOICE

## 2019-04-17 NOTE — TELEPHONE ENCOUNTER
----- Message from Mariaelena Mallory MD sent at 4/17/2019  2:14 PM CDT -----  Call and notify pt her us showed no abnormalities

## 2019-04-18 NOTE — TELEPHONE ENCOUNTER
Tried calling pt again on work and mobile phone number------[email or web address] to reach pt-----called her sister Erendira to tell pt give the office a call back.

## 2019-04-24 ENCOUNTER — PATIENT MESSAGE (OUTPATIENT)
Dept: INTERNAL MEDICINE | Facility: CLINIC | Age: 74
End: 2019-04-24

## 2019-04-24 DIAGNOSIS — Z78.0 POSTMENOPAUSAL: Primary | ICD-10-CM

## 2019-04-24 DIAGNOSIS — M85.80 OSTEOPENIA, UNSPECIFIED LOCATION: ICD-10-CM

## 2019-05-14 ENCOUNTER — HOSPITAL ENCOUNTER (EMERGENCY)
Facility: OTHER | Age: 74
Discharge: HOME | End: 2019-05-14
Attending: EMERGENCY MEDICINE
Payer: MEDICARE

## 2019-05-14 VITALS
HEIGHT: 64 IN | WEIGHT: 180 LBS | SYSTOLIC BLOOD PRESSURE: 148 MMHG | TEMPERATURE: 99 F | HEART RATE: 78 BPM | OXYGEN SATURATION: 97 % | RESPIRATION RATE: 18 BRPM | DIASTOLIC BLOOD PRESSURE: 88 MMHG | BODY MASS INDEX: 30.73 KG/M2
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
## 2019-05-14 DIAGNOSIS — S82.891A CLOSED FRACTURE OF RIGHT ANKLE, INITIAL ENCOUNTER: Primary | ICD-10-CM

## 2019-05-14 DIAGNOSIS — M25.579 ANKLE PAIN: ICD-10-CM

## 2019-05-14 DIAGNOSIS — S80.212A ABRASION OF LEFT KNEE, INITIAL ENCOUNTER: ICD-10-CM

## 2019-05-14 PROCEDURE — 25000003 PHARM REV CODE 250: Performed by: EMERGENCY MEDICINE

## 2019-05-14 PROCEDURE — 99283 EMERGENCY DEPT VISIT LOW MDM: CPT | Mod: 25

## 2019-05-14 RX ADMIN — NEOMYCIN AND POLYMYXIN B SULFATES AND BACITRACIN ZINC 1 EACH: 400; 3.5; 5 OINTMENT TOPICAL at 02:05

## 2019-05-14 NOTE — ED PROVIDER NOTES
Encounter Date: 5/14/2019    SCRIBE #1 NOTE: Margoth AYERS am scribing for, and in the presence of, Dr. Jean.       History     Chief Complaint   Patient presents with    Ankle Pain     Pt c/o right ankle pain & swelling after rolling it & falling while walking at 9 am. Pt has an abrasion to the left knee, denies head injury or LOC.     Time seen by provider: 1:29 PM    This is a 73 y.o. female with hx of arthritis who presents with complaint of right ankle pain that began after twisting the ankle during a fall this morning. She reports ankle swelling, blue discoloration to ankle, and left knee abrasion. She denies head trauma or LOC. She reports improvement with two Advil and applying ice. She is UTD on tetanus (two or three years ago). She reports PSHx of left ankle surgery. Her orthopedist is Dr. Galicia.    The history is provided by the patient.     Review of patient's allergies indicates:   Allergen Reactions    Codeine Anaphylaxis     Other reaction(s): Anaphylaxis    Penicillins      Other reaction(s): dizzy     Niacin preparations Other (See Comments)     Flushing/lips swelling      Past Medical History:   Diagnosis Date    Allergy     hx rhinitis     Arthritis     osteoathrtis knee    Asthma     childhood asthma     Cancer     left breast invasive ductal carcinoma     Depression     Diabetes mellitus type II     Hyperlipidemia     Mild vitamin D deficiency     Pulmonary nodule     micronodule 8/2017      Past Surgical History:   Procedure Laterality Date    BREAST SURGERY Left 07/2018    left breast lumpectomy     eyelid cyst removed      foreign body removed upper airway       left ankle fracture and repair/orif       neuroma removed      right foot     TONSILLECTOMY      UPPER GASTROINTESTINAL ENDOSCOPY       Family History   Problem Relation Age of Onset    Parkinsonism Mother     Dementia Mother     Stroke Father     Diabetes Father     Diabetes Brother      Social  History     Tobacco Use    Smoking status: Never Smoker    Smokeless tobacco: Never Used   Substance Use Topics    Alcohol use: No     Frequency: Never     Comment: rarely    Drug use: No     Review of Systems   Constitutional: Negative for fever.   HENT: Negative for sore throat.    Respiratory: Negative for shortness of breath.    Cardiovascular: Negative for chest pain.   Gastrointestinal: Negative for nausea.   Genitourinary: Negative for dysuria.   Musculoskeletal: Positive for arthralgias (right ankle) and joint swelling (right ankle). Negative for back pain.   Skin: Positive for color change and wound. Negative for rash.   Neurological: Negative for weakness.   Hematological: Does not bruise/bleed easily.       Physical Exam     Initial Vitals [05/14/19 1317]   BP Pulse Resp Temp SpO2   (!) 148/88 78 18 98.8 °F (37.1 °C) 97 %      MAP       --         Physical Exam    Nursing note and vitals reviewed.  Constitutional: She appears well-developed and well-nourished. She is not diaphoretic. She is Obese . No distress.   HENT:   Head: Normocephalic and atraumatic.   Neck: Normal range of motion.   Pulmonary/Chest: No respiratory distress.   Musculoskeletal:   RLE: Hypertrophic knee changes, able to fully range.  LLE: Edema and tenderness to right lateral malleolus, no tenderness to medial malleolus, range limited secondary to pain and swelling, no tenderness to proximal fibular region.   Neurological: She is alert and oriented to person, place, and time.   Skin: Skin is warm and dry.   Punctate abrasion to left patellar region.   Psychiatric: She has a normal mood and affect.         ED Course   Procedures  Labs Reviewed - No data to display       Imaging Results           X-Ray Ankle Complete Right (Final result)  Result time 05/14/19 14:02:56    Final result by Jovany Cortes II, MD (05/14/19 14:02:56)                 Impression:      Acute nondisplaced fracture of the lateral malleolus with associated  soft tissue swelling.    This report was flagged in Epic as abnormal.      Electronically signed by: Jovany Saavedrameghna  Date:    05/14/2019  Time:    14:02             Narrative:    EXAMINATION:  XR ANKLE COMPLETE 3 VIEW RIGHT    CLINICAL HISTORY:  Pain in unspecified ankle and joints of unspecified foot    TECHNIQUE:  AP, lateral, and oblique images of the right ankle were performed.    COMPARISON:  None available    FINDINGS:  Nondisplaced fracture of the lateral malleolus.  The ankle mortise is symmetric.  Plantar and dorsal calcaneal enthesophytes are present.  Diffuse demineralization.  Market soft tissue swelling about the lateral malleolus.                              X-Rays:   Independently Interpreted Readings:   Other Readings:  Right Ankle: Questionable fracture to distal fibula.    Medical Decision Making:   Initial Assessment:   72 yo F here w R ankle pain after trip and fall. No head trauma, tetanus UTD. Pt has small skin avulsion to L knee w preserved ROM, edema and ttp to R lat mal w NV intact distally. Imaging w fx to lateral mal. Pt made aware of diagnosis, placed in walking boot w home crutches, weight bearing as tolerated and fu Ortho- already established w Dr Galicia.   Independently Interpreted Test(s):   I have ordered and independently interpreted X-rays - see prior notes.  Clinical Tests:   Radiological Study: Reviewed            Scribe Attestation:   Scribe #1: I performed the above scribed service and the documentation accurately describes the services I performed. I attest to the accuracy of the note.    Attending Attestation:           Physician Attestation for Scribe:  Physician Attestation Statement for Scribe #1: I, Dr. Jean, reviewed documentation, as scribed by Margoth Arteaga in my presence, and it is both accurate and complete.                    Clinical Impression:     1. Closed fracture of right ankle, initial encounter    2. Ankle pain    3. Fall, initial encounter    4.  Abrasion of left knee, initial encounter          Disposition:   Disposition: Discharged  Condition: Fair                        Diana Jean MD  05/14/19 1523       Diana Jean MD  05/14/19 1524

## 2019-05-15 ENCOUNTER — PATIENT MESSAGE (OUTPATIENT)
Dept: ORTHOPEDICS | Facility: CLINIC | Age: 74
End: 2019-05-15

## 2019-05-15 ENCOUNTER — PATIENT MESSAGE (OUTPATIENT)
Dept: INTERNAL MEDICINE | Facility: CLINIC | Age: 74
End: 2019-05-15

## 2019-05-16 DIAGNOSIS — E53.8 B12 DEFICIENCY: Primary | ICD-10-CM

## 2019-05-16 PROBLEM — K29.40 ATROPHIC GASTRITIS WITHOUT HEMORRHAGE: Status: ACTIVE | Noted: 2019-05-16

## 2019-05-16 RX ORDER — CYANOCOBALAMIN 1000 UG/ML
1000 INJECTION, SOLUTION INTRAMUSCULAR; SUBCUTANEOUS WEEKLY
Status: DISCONTINUED | OUTPATIENT
Start: 2019-05-16 | End: 2020-01-20

## 2019-05-16 RX ORDER — CYANOCOBALAMIN 1000 UG/ML
INJECTION, SOLUTION INTRAMUSCULAR; SUBCUTANEOUS
Qty: 10 ML | Refills: 6 | Status: SHIPPED | OUTPATIENT
Start: 2019-05-16 | End: 2020-04-20 | Stop reason: SDUPTHER

## 2019-05-16 NOTE — TELEPHONE ENCOUNTER
Spoke with pt  Will proceed with b12 injections as needed   Outlying autoimmne/atrophic gastritis  Gi request treatment injection monthly     Plan weekly b12 injection for 4 weeks 1000 mcg   Then  Monthly thereafter  rx printed to give to pt when she comes in if needed  Not sure her medication coverage  Please call and assist pt in scheudling her appointments for   b12 and instruction

## 2019-05-20 ENCOUNTER — CLINICAL SUPPORT (OUTPATIENT)
Dept: INTERNAL MEDICINE | Facility: CLINIC | Age: 74
End: 2019-05-20
Payer: MEDICARE

## 2019-05-20 VITALS — DIASTOLIC BLOOD PRESSURE: 72 MMHG | SYSTOLIC BLOOD PRESSURE: 126 MMHG

## 2019-05-20 PROCEDURE — 99211 OFF/OP EST MAY X REQ PHY/QHP: CPT | Mod: PBBFAC,25

## 2019-05-20 PROCEDURE — 96372 THER/PROPH/DIAG INJ SC/IM: CPT | Mod: PBBFAC

## 2019-05-20 PROCEDURE — 99999 PR PBB SHADOW E&M-EST. PATIENT-LVL I: ICD-10-PCS | Mod: PBBFAC,,,

## 2019-05-20 PROCEDURE — 99999 PR PBB SHADOW E&M-EST. PATIENT-LVL I: CPT | Mod: PBBFAC,,,

## 2019-05-20 RX ADMIN — CYANOCOBALAMIN 1000 MCG: 1000 INJECTION, SOLUTION INTRAMUSCULAR; SUBCUTANEOUS at 09:05

## 2019-05-20 NOTE — PROGRESS NOTES
Pt supplied B12   NDC 78983-739-97  LOT # [id number]  EXP: 02/21  [employer] [employer]

## 2019-05-22 ENCOUNTER — TELEPHONE (OUTPATIENT)
Dept: INTERNAL MEDICINE | Facility: CLINIC | Age: 74
End: 2019-05-22

## 2019-05-22 NOTE — TELEPHONE ENCOUNTER
Outlying labs reivewed  From pt gi dr. siddiqui  intrisic antibiody negative, b12 298  Folate 15.4, parietal cell ab screen positive 1:640

## 2019-05-26 ENCOUNTER — PATIENT MESSAGE (OUTPATIENT)
Dept: ORTHOPEDICS | Facility: CLINIC | Age: 74
End: 2019-05-26

## 2019-05-27 ENCOUNTER — CLINICAL SUPPORT (OUTPATIENT)
Dept: INTERNAL MEDICINE | Facility: CLINIC | Age: 74
End: 2019-05-27
Payer: MEDICARE

## 2019-05-27 PROCEDURE — 96372 THER/PROPH/DIAG INJ SC/IM: CPT | Mod: PBBFAC

## 2019-05-27 RX ADMIN — CYANOCOBALAMIN 1000 MCG: 1000 INJECTION, SOLUTION INTRAMUSCULAR; SUBCUTANEOUS at 09:05

## 2019-05-27 NOTE — PROGRESS NOTES
2 Pt identifiers used. Pt requested to stay for 15mins.   Pt provided own B12 inj   NDC 79197-832-93  EXP: 02/21  LOT # [id number]

## 2019-05-28 ENCOUNTER — PATIENT MESSAGE (OUTPATIENT)
Dept: ORTHOPEDICS | Facility: CLINIC | Age: 74
End: 2019-05-28

## 2019-06-04 ENCOUNTER — CLINICAL SUPPORT (OUTPATIENT)
Dept: INTERNAL MEDICINE | Facility: CLINIC | Age: 74
End: 2019-06-04
Payer: MEDICARE

## 2019-06-04 PROCEDURE — 96372 THER/PROPH/DIAG INJ SC/IM: CPT | Mod: PBBFAC

## 2019-06-04 RX ADMIN — CYANOCOBALAMIN 1000 MCG: 1000 INJECTION, SOLUTION INTRAMUSCULAR; SUBCUTANEOUS at 09:06

## 2019-06-07 DIAGNOSIS — E11.9 TYPE 2 DIABETES MELLITUS WITHOUT COMPLICATION, UNSPECIFIED WHETHER LONG TERM INSULIN USE: ICD-10-CM

## 2019-06-10 ENCOUNTER — CLINICAL SUPPORT (OUTPATIENT)
Dept: INTERNAL MEDICINE | Facility: CLINIC | Age: 74
End: 2019-06-10
Payer: MEDICARE

## 2019-06-10 PROCEDURE — 96372 THER/PROPH/DIAG INJ SC/IM: CPT | Mod: PBBFAC

## 2019-06-10 RX ADMIN — CYANOCOBALAMIN 1000 MCG: 1000 INJECTION, SOLUTION INTRAMUSCULAR; SUBCUTANEOUS at 09:06

## 2019-06-20 ENCOUNTER — OFFICE VISIT (OUTPATIENT)
Dept: ORTHOPEDICS | Facility: CLINIC | Age: 74
End: 2019-06-20
Payer: MEDICARE

## 2019-06-20 ENCOUNTER — HOSPITAL ENCOUNTER (OUTPATIENT)
Dept: RADIOLOGY | Facility: HOSPITAL | Age: 74
Discharge: HOME | End: 2019-06-20
Attending: ORTHOPAEDIC SURGERY
Payer: MEDICARE

## 2019-06-20 DIAGNOSIS — M25.571 RIGHT ANKLE PAIN, UNSPECIFIED CHRONICITY: ICD-10-CM

## 2019-06-20 DIAGNOSIS — M25.571 RIGHT ANKLE PAIN, UNSPECIFIED CHRONICITY: Primary | ICD-10-CM

## 2019-06-20 DIAGNOSIS — S93.491A SPRAIN OF ANTERIOR TALOFIBULAR LIGAMENT OF RIGHT ANKLE, INITIAL ENCOUNTER: ICD-10-CM

## 2019-06-20 PROCEDURE — 99213 PR OFFICE/OUTPT VISIT, EST, LEVL III, 20-29 MIN: ICD-10-PCS | Mod: S$PBB,,, | Performed by: ORTHOPAEDIC SURGERY

## 2019-06-20 PROCEDURE — 73610 X-RAY EXAM OF ANKLE: CPT | Mod: TC,RT

## 2019-06-20 PROCEDURE — 99212 OFFICE O/P EST SF 10 MIN: CPT | Mod: PBBFAC,25 | Performed by: ORTHOPAEDIC SURGERY

## 2019-06-20 PROCEDURE — 99999 PR PBB SHADOW E&M-EST. PATIENT-LVL II: CPT | Mod: PBBFAC,,, | Performed by: ORTHOPAEDIC SURGERY

## 2019-06-20 PROCEDURE — 99213 OFFICE O/P EST LOW 20 MIN: CPT | Mod: S$PBB,,, | Performed by: ORTHOPAEDIC SURGERY

## 2019-06-20 PROCEDURE — 99999 PR PBB SHADOW E&M-EST. PATIENT-LVL II: ICD-10-PCS | Mod: PBBFAC,,, | Performed by: ORTHOPAEDIC SURGERY

## 2019-06-20 PROCEDURE — 73610 X-RAY EXAM OF ANKLE: CPT | Mod: 26,RT,, | Performed by: RADIOLOGY

## 2019-06-20 PROCEDURE — 73610 XR ANKLE COMPLETE 3 VIEW RIGHT: ICD-10-PCS | Mod: 26,RT,, | Performed by: RADIOLOGY

## 2019-06-21 NOTE — PROGRESS NOTES
Loni Heard  Returns today for follow-up with a new problem.  About 4 weeks ago she sustained an inversion injury of her right ankle and had an x-ray which suggested a small avulsion fragment of the distal fibula.  She was seen elsewhere initially for this and comes in for evaluation. She reports some continued swelling but is able to walk without any difficulty.  I most recently had been seeing her for some metatarsalgia of her right foot and has some discussion with her about re-explore ring the plantar aspect of her foot where she had a previous Vyas's neurectomy.    Examination:  She walks in with a normal gait. On inspection she has some mild swelling of her right ankle compared to the left.  On sitting exam she has some mild tenderness over the distal fibula and the anterolateral ligaments. There is no gross instability of the ankle.  She has good function of her peroneal tendons.  There is no medial-sided tenderness.    X-ray:  I ordered an x-ray of her right ankle today and compared to an x-ray from 4 weeks ago.  There is a very small avulsion fragment off the distal fibula consistent with a bony ankle sprain.    Impression:  Right ankle sprain with small avulsion distal fibula, healing well    Recommendation:  She can continue with her current level of activity.  This should heal with time.    Follow-up as needed

## 2019-07-08 ENCOUNTER — CLINICAL SUPPORT (OUTPATIENT)
Dept: INTERNAL MEDICINE | Facility: CLINIC | Age: 74
End: 2019-07-08
Payer: MEDICARE

## 2019-07-08 PROCEDURE — 96372 THER/PROPH/DIAG INJ SC/IM: CPT | Mod: PBBFAC

## 2019-07-08 RX ADMIN — CYANOCOBALAMIN 1000 MCG: 1000 INJECTION, SOLUTION INTRAMUSCULAR; SUBCUTANEOUS at 09:07

## 2019-07-08 NOTE — PROGRESS NOTES
2 pt identifiers. Pt tolerated well. Pt in no distress. Pt requested to remain 15 minutes.     Cyanocobalamin 1000 mcg/ml, Lot #[id number],Exp:02/2021.

## 2019-08-05 ENCOUNTER — CLINICAL SUPPORT (OUTPATIENT)
Dept: INTERNAL MEDICINE | Facility: CLINIC | Age: 74
End: 2019-08-05
Payer: MEDICARE

## 2019-08-05 ENCOUNTER — HOSPITAL ENCOUNTER (OUTPATIENT)
Dept: RADIOLOGY | Facility: CLINIC | Age: 74
Discharge: HOME | End: 2019-08-05
Attending: INTERNAL MEDICINE
Payer: MEDICARE

## 2019-08-05 DIAGNOSIS — Z78.0 POSTMENOPAUSAL: ICD-10-CM

## 2019-08-05 DIAGNOSIS — M85.80 OSTEOPENIA, UNSPECIFIED LOCATION: ICD-10-CM

## 2019-08-05 PROCEDURE — 77080 DXA BONE DENSITY AXIAL: CPT | Mod: 26,,, | Performed by: INTERNAL MEDICINE

## 2019-08-05 PROCEDURE — 77080 DXA BONE DENSITY AXIAL: CPT | Mod: TC

## 2019-08-05 PROCEDURE — 77080 DEXA BONE DENSITY SPINE HIP: ICD-10-PCS | Mod: 26,,, | Performed by: INTERNAL MEDICINE

## 2019-08-05 PROCEDURE — 96372 THER/PROPH/DIAG INJ SC/IM: CPT | Mod: PBBFAC

## 2019-08-05 RX ADMIN — CYANOCOBALAMIN 1000 MCG: 1000 INJECTION, SOLUTION INTRAMUSCULAR; SUBCUTANEOUS at 09:08

## 2019-08-07 ENCOUNTER — LAB VISIT (OUTPATIENT)
Dept: LAB | Facility: HOSPITAL | Age: 74
End: 2019-08-07
Attending: INTERNAL MEDICINE
Payer: MEDICARE

## 2019-08-07 DIAGNOSIS — E11.9 TYPE 2 DIABETES MELLITUS WITHOUT COMPLICATION, WITHOUT LONG-TERM CURRENT USE OF INSULIN: ICD-10-CM

## 2019-08-07 LAB
ALBUMIN SERPL BCP-MCNC: 4.2 G/DL (ref 3.5–5.2)
ALP SERPL-CCNC: 71 U/L (ref 55–135)
ALT SERPL W/O P-5'-P-CCNC: 19 U/L (ref 10–44)
ANION GAP SERPL CALC-SCNC: 8 MMOL/L (ref 8–16)
AST SERPL-CCNC: 20 U/L (ref 10–40)
BILIRUB DIRECT SERPL-MCNC: 0.2 MG/DL (ref 0.1–0.3)
BILIRUB SERPL-MCNC: 0.4 MG/DL (ref 0.1–1)
BUN SERPL-MCNC: 15 MG/DL (ref 8–23)
CALCIUM SERPL-MCNC: 10 MG/DL (ref 8.7–10.5)
CHLORIDE SERPL-SCNC: 104 MMOL/L (ref 95–110)
CHOLEST SERPL-MCNC: 232 MG/DL (ref 120–199)
CHOLEST/HDLC SERPL: 4.5 {RATIO} (ref 2–5)
CO2 SERPL-SCNC: 29 MMOL/L (ref 23–29)
CREAT SERPL-MCNC: 0.9 MG/DL (ref 0.5–1.4)
EST. GFR  (AFRICAN AMERICAN): >60 ML/MIN/1.73 M^2
EST. GFR  (NON AFRICAN AMERICAN): >60 ML/MIN/1.73 M^2
ESTIMATED AVG GLUCOSE: 143 MG/DL (ref 68–131)
GLUCOSE SERPL-MCNC: 143 MG/DL (ref 70–110)
HBA1C MFR BLD HPLC: 6.6 % (ref 4–5.6)
HDLC SERPL-MCNC: 51 MG/DL (ref 40–75)
HDLC SERPL: 22 % (ref 20–50)
LDLC SERPL CALC-MCNC: 149.8 MG/DL (ref 63–159)
NONHDLC SERPL-MCNC: 181 MG/DL
POTASSIUM SERPL-SCNC: 4.9 MMOL/L (ref 3.5–5.1)
PROT SERPL-MCNC: 7.1 G/DL (ref 6–8.4)
SODIUM SERPL-SCNC: 141 MMOL/L (ref 136–145)
TRIGL SERPL-MCNC: 156 MG/DL (ref 30–150)
TSH SERPL DL<=0.005 MIU/L-ACNC: 2.84 UIU/ML (ref 0.4–4)

## 2019-08-07 PROCEDURE — 80048 BASIC METABOLIC PNL TOTAL CA: CPT

## 2019-08-07 PROCEDURE — 84443 ASSAY THYROID STIM HORMONE: CPT

## 2019-08-07 PROCEDURE — 36415 COLL VENOUS BLD VENIPUNCTURE: CPT

## 2019-08-07 PROCEDURE — 80076 HEPATIC FUNCTION PANEL: CPT

## 2019-08-07 PROCEDURE — 80061 LIPID PANEL: CPT

## 2019-08-07 PROCEDURE — 83036 HEMOGLOBIN GLYCOSYLATED A1C: CPT

## 2019-08-13 ENCOUNTER — OFFICE VISIT (OUTPATIENT)
Dept: INTERNAL MEDICINE | Facility: CLINIC | Age: 74
End: 2019-08-13
Payer: MEDICARE

## 2019-08-13 VITALS
HEIGHT: 64 IN | BODY MASS INDEX: 30.6 KG/M2 | DIASTOLIC BLOOD PRESSURE: 74 MMHG | OXYGEN SATURATION: 96 % | TEMPERATURE: 98 F | WEIGHT: 179.25 LBS | HEART RATE: 77 BPM | SYSTOLIC BLOOD PRESSURE: 126 MMHG

## 2019-08-13 DIAGNOSIS — E11.9 TYPE 2 DIABETES MELLITUS WITHOUT COMPLICATION, WITHOUT LONG-TERM CURRENT USE OF INSULIN: Primary | ICD-10-CM

## 2019-08-13 DIAGNOSIS — E55.9 VITAMIN D DEFICIENCY: ICD-10-CM

## 2019-08-13 DIAGNOSIS — E78.49 OTHER HYPERLIPIDEMIA: ICD-10-CM

## 2019-08-13 DIAGNOSIS — Z85.3 HX: BREAST CANCER: ICD-10-CM

## 2019-08-13 DIAGNOSIS — M85.80 OSTEOPENIA, UNSPECIFIED LOCATION: ICD-10-CM

## 2019-08-13 DIAGNOSIS — E53.8 B12 DEFICIENCY: ICD-10-CM

## 2019-08-13 PROBLEM — M81.0 OSTEOPOROSIS: Status: RESOLVED | Noted: 2018-08-15 | Resolved: 2019-08-13

## 2019-08-13 PROCEDURE — 99214 PR OFFICE/OUTPT VISIT, EST, LEVL IV, 30-39 MIN: ICD-10-PCS | Mod: S$PBB,,, | Performed by: INTERNAL MEDICINE

## 2019-08-13 PROCEDURE — 99213 OFFICE O/P EST LOW 20 MIN: CPT | Mod: PBBFAC | Performed by: INTERNAL MEDICINE

## 2019-08-13 PROCEDURE — 99999 PR PBB SHADOW E&M-EST. PATIENT-LVL III: ICD-10-PCS | Mod: PBBFAC,,, | Performed by: INTERNAL MEDICINE

## 2019-08-13 PROCEDURE — 99214 OFFICE O/P EST MOD 30 MIN: CPT | Mod: S$PBB,,, | Performed by: INTERNAL MEDICINE

## 2019-08-13 PROCEDURE — 99999 PR PBB SHADOW E&M-EST. PATIENT-LVL III: CPT | Mod: PBBFAC,,, | Performed by: INTERNAL MEDICINE

## 2019-08-13 NOTE — PROGRESS NOTES
"Subjective:       Patient ID: Loni Heard is a 73 y.o. female.    Chief Complaint: Follow-up (4 month)   This is a 73-year-old who presents today for follow-up she reports that she has been doing fairly well she has been back to exercising regularly she continues to follow with her outlying hematologist and breast surgeon after her previous surgery.  She went to [facility] doctor was found to have atrophic gastritis has started on B12 injections which she is getting monthly.  She has also seen orthopedist for her foot discomfort and for now using conservative measures such as foot inserts which seem to be helping her.  She has been more active trying to eat healthy and tries to do exercise mostly swimming.  She gets occasional tension headaches has been following with her outlying eye doctor.  She has also eliminated caffeine recently.  She had her bone density and plans to discuss further with her hematologist she reports they had prescribed her bisphosphonate but she is not wishing to take that she wanted a copy of her bone density to bring and plans to discuss further with them her bone disc have been fairly stable since last visit and she is started more exercising with a  as well .     HPI  Review of Systems   Constitutional: Negative for fever.   Respiratory: Negative for cough and shortness of breath.    Gastrointestinal: Negative for abdominal pain.   Neurological: Negative for dizziness.        Occasional headaches       Objective:     /74 (BP Location: Right arm, Patient Position: Sitting, BP Method: Large (Manual))   Pulse 77   Temp 98.2 °F (36.8 °C) (Oral)   Ht 5' 4" (1.626 m)   Wt 81.3 kg (179 lb 3.7 oz)   SpO2 96%   BMI 30.77 kg/m²   Physical Exam   Constitutional: No distress.   HENT:   Head: Normocephalic.   Mouth/Throat: Oropharynx is clear and moist.   Eyes: No scleral icterus.   Neck: Neck supple.   Cardiovascular: Normal rate, regular rhythm and normal heart sounds. " Exam reveals no gallop and no friction rub.   No murmur heard.  Pulmonary/Chest: Effort normal and breath sounds normal. No respiratory distress.   Surgical changes left breast    Abdominal: Soft. Bowel sounds are normal. She exhibits no mass. There is no tenderness.   Musculoskeletal: She exhibits no edema.   Neurological: She is alert.   Skin: No erythema.   Vitals reviewed.      Assessment:       1. Type 2 diabetes mellitus without complication, without long-term current use of insulin    2. B12 deficiency    3. Vitamin D deficiency    4. HX: breast cancer    5. Osteopenia, unspecified location    6. Other hyperlipidemia        Plan:       Loni was seen today for follow-up.    Diagnoses and all orders for this visit:    Type 2 diabetes mellitus without complication, without long-term current use of insulin  History of she is tolerating Januvia hemoglobin A1c fairly stable  -     Basic metabolic panel; Future  -     CBC auto differential; Future  -     Hemoglobin A1c; Future  -     Hepatic function panel; Future  -     Vitamin B12; Future    B12 deficiency  With history of atrophic gastritis she is taking B12 and continues she is following with her outlying gastroenterologist  -     Basic metabolic panel; Future  -     CBC auto differential; Future  -     Hemoglobin A1c; Future  -     Hepatic function panel; Future  -     Vitamin B12; Future    Vitamin D deficiency  She remains on vitamin-D  -     Vitamin D; Future    HX: breast cancer  She continues to follow with her outlying hematologist oncologist and breast surgeon    Osteopenia,   Recent bone density discussed she plans to brain a copy to her hematologist as she is currently on Arimidex and has discussed treatment options    Hyperlipidemia she remains on Zetia and has declined statins    For intermittent headaches discussed hydration  Will track symptoms      call with concerns    Follow-up 4 months with labs

## 2019-09-16 ENCOUNTER — OFFICE VISIT (OUTPATIENT)
Dept: URGENT CARE | Facility: CLINIC | Age: 74
End: 2019-09-16
Payer: MEDICARE

## 2019-09-16 VITALS
RESPIRATION RATE: 18 BRPM | SYSTOLIC BLOOD PRESSURE: 153 MMHG | HEART RATE: 74 BPM | WEIGHT: 179 LBS | OXYGEN SATURATION: 97 % | TEMPERATURE: 99 F | HEIGHT: 64 IN | BODY MASS INDEX: 30.56 KG/M2 | DIASTOLIC BLOOD PRESSURE: 83 MMHG

## 2019-09-16 DIAGNOSIS — E78.49 OTHER HYPERLIPIDEMIA: ICD-10-CM

## 2019-09-16 DIAGNOSIS — I10 HYPERTENSION, UNSPECIFIED TYPE: Primary | ICD-10-CM

## 2019-09-16 DIAGNOSIS — E11.9 TYPE 2 DIABETES MELLITUS WITHOUT COMPLICATION, WITHOUT LONG-TERM CURRENT USE OF INSULIN: ICD-10-CM

## 2019-09-16 DIAGNOSIS — R53.81 MALAISE: ICD-10-CM

## 2019-09-16 DIAGNOSIS — K29.40 ATROPHIC GASTRITIS WITHOUT HEMORRHAGE: ICD-10-CM

## 2019-09-16 PROCEDURE — 93010 ELECTROCARDIOGRAM REPORT: CPT | Mod: S$GLB,,, | Performed by: INTERNAL MEDICINE

## 2019-09-16 PROCEDURE — 93005 EKG 12-LEAD: ICD-10-PCS | Mod: S$GLB,,, | Performed by: SURGERY

## 2019-09-16 PROCEDURE — 93005 ELECTROCARDIOGRAM TRACING: CPT | Mod: S$GLB,,, | Performed by: SURGERY

## 2019-09-16 PROCEDURE — 99214 OFFICE O/P EST MOD 30 MIN: CPT | Mod: S$GLB,,, | Performed by: SURGERY

## 2019-09-16 PROCEDURE — 93010 EKG 12-LEAD: ICD-10-PCS | Mod: S$GLB,,, | Performed by: INTERNAL MEDICINE

## 2019-09-16 PROCEDURE — 99214 PR OFFICE/OUTPT VISIT, EST, LEVL IV, 30-39 MIN: ICD-10-PCS | Mod: S$GLB,,, | Performed by: SURGERY

## 2019-09-16 NOTE — PROGRESS NOTES
"Subjective:       Patient ID: Loni Heard is a 73 y.o. female.    Vitals:  height is 5' 4" (1.626 m) and weight is 81.2 kg (179 lb). Her temperature is 98.5 °F (36.9 °C). Her blood pressure is 153/83 (abnormal) and her pulse is 74. Her respiration is 18 and oxygen saturation is 97%.     Chief Complaint: Hypertension    Patient complains of:   Dizziness  Slightly blurred vision  Patient has been having symptoms for about one week    b12 once a month.    PT PRIMARILY DESCRIBES FEELING MALAISE, "OUT OF IT" AND "NOT MYSELF" STARTED LAST WEEK AND CHECKED BP AND NOTE SYSTOLIC UP -180 WHEN FEELING BAD THOUGH DID NOT CHECK WHEN FEELING WELL. NO PALPITATIONS/LOSS OF BALANCE/VERTIGO/OR DIZZINESS UPON STANDING. NO CHEST PAIN OR HEADACHE. NO SOB OR ANGUIANO OR ORHTOPNEA    Hypertension   This is a new problem. The current episode started in the past 7 days. The problem has been gradually worsening since onset. Associated symptoms include blurred vision. Pertinent negatives include no chest pain, headaches or shortness of breath.       Constitution: Negative for chills, fatigue and fever.   HENT: Negative for congestion and sore throat.    Neck: Negative for painful lymph nodes.   Cardiovascular: Negative for chest pain and leg swelling.   Eyes: Positive for blurred vision. Negative for double vision.   Respiratory: Negative for cough and shortness of breath.    Gastrointestinal: Negative for nausea, vomiting and diarrhea.   Genitourinary: Negative for dysuria, frequency, urgency and history of kidney stones.   Musculoskeletal: Negative for joint pain, joint swelling, muscle cramps and muscle ache.   Skin: Negative for color change, pale, rash and bruising.   Allergic/Immunologic: Negative for seasonal allergies.   Neurological: Positive for dizziness. Negative for history of vertigo, light-headedness, passing out and headaches.   Hematologic/Lymphatic: Negative for swollen lymph nodes.   Psychiatric/Behavioral: " Negative for nervous/anxious, sleep disturbance and depression. The patient is not nervous/anxious.        Objective:      Physical Exam   Constitutional: She is oriented to person, place, and time. She appears well-developed and well-nourished. She is cooperative.  Non-toxic appearance. She does not appear ill. No distress.   HENT:   Head: Normocephalic and atraumatic.   Right Ear: Hearing, tympanic membrane, external ear and ear canal normal.   Left Ear: Hearing, tympanic membrane, external ear and ear canal normal.   Nose: Nose normal. No mucosal edema, rhinorrhea or nasal deformity. No epistaxis. Right sinus exhibits no maxillary sinus tenderness and no frontal sinus tenderness. Left sinus exhibits no maxillary sinus tenderness and no frontal sinus tenderness.   Mouth/Throat: Uvula is midline, oropharynx is clear and moist and mucous membranes are normal. No trismus in the jaw. Normal dentition. No uvula swelling. No posterior oropharyngeal erythema.   Eyes: Conjunctivae and lids are normal. Right eye exhibits no discharge. Left eye exhibits no discharge. No scleral icterus.   Sclera clear bilat   Neck: Trachea normal, normal range of motion, full passive range of motion without pain and phonation normal. Neck supple.   Cardiovascular: Normal rate, regular rhythm, normal heart sounds, intact distal pulses and normal pulses.   Pulmonary/Chest: Effort normal and breath sounds normal. No respiratory distress.   Abdominal: Soft. Normal appearance and bowel sounds are normal. She exhibits no distension, no pulsatile midline mass and no mass. There is no tenderness.   Musculoskeletal: Normal range of motion. She exhibits no edema or deformity.   Neurological: She is alert and oriented to person, place, and time. She exhibits normal muscle tone. Coordination normal.   Skin: Skin is warm, dry and intact. She is not diaphoretic. No pallor.   Psychiatric: She has a normal mood and affect. Her speech is normal and behavior  is normal. Judgment and thought content normal. Cognition and memory are normal.   Nursing note and vitals reviewed.      Assessment:       1. Hypertension, unspecified type    2. Malaise    3. Type 2 diabetes mellitus without complication, without long-term current use of insulin    4. Other hyperlipidemia    5. Atrophic gastritis without hemorrhage        Plan:         Hypertension, unspecified type  -     EKG 12-lead    Malaise    Type 2 diabetes mellitus without complication, without long-term current use of insulin    Other hyperlipidemia    Atrophic gastritis without hemorrhage    pt had one prior episode last month and had labs which were WNL except lipids.     EKG: normal EKG, normal sinus rhythm, there are no previous tracings available for comparison.    Reviewed medications and supplements.  Patient reports having started on Levsin to 3 months ago and taking it daily.  She also takes multiple vitamin supplements which we reviewed.  I recommended it would be safe to try being off the Levsin for few days and double check with her primary care and gastroenterologist.  Also recommended checking her sugar when she has episodes as described.  She had not been checking sugar and her glucose has been well controlled.    Patient Instructions     High Blood Pressure, To Be Confirmed, No Treatment  Your blood pressure today was higher than normal. Sometimes anxiety or pain can cause a temporary rise in blood pressure. It later returns to normal. Blood pressure that is high only one time doesnt mean that you have high blood pressure (hypertension). High blood pressure is a chronic illness. But you should have your blood pressure measured again within the next few days to find out if its still high.    A blood pressure reading is made up of two numbers: a higher number over a lower number. The top number is the systolic pressure. The bottom number is the diastolic pressure. A normal blood pressure is a systolic  pressure of less than 120 over a diastolic pressure of less than 80. You will see your blood pressure readings written together. For example, a person with a systolic pressure of 118 and a diastolic pressure of 78 will have 118/78 written in the medical record.     High blood pressure is when either the top number is 140 or higher, or the bottom number is 90 or higher. This must be the result when taking your blood pressure a number of times.   The blood pressures between normal and high are called prehypertension. This is systolic pressure of 120 to 140 or diastolic pressure of 80 to 89. Prehypertension means you are at risk of getting high blood pressure. It's a warning sign. The information gives you a chance to  make lifestyle changes such as weight loss, exercise, and quitting smoking, that can keep your blood pressure from going higher. You should have your blood pressure checked regularly to be sure it isnt rising.  Home care  To track your blood pressure, your provider may ask you to come into the office at different times and on different days. If your healthcare provider asks you to check your readings at home, ask him or her what times of the day to test and for how many days. Before you leave the office, ask your provider to show you how to take your blood pressure and be sure to ask questions if you don't understand something.  Consider buying an automatic blood pressure monitor. Ask your provider for a recommendation. You can buy blood pressure monitors at most pharmacies.  The American Heart Association recommends the following guidelines for home blood pressure monitoring:  · Don't smoke or drink coffee for 30 minutes before taking your blood pressure.  · Go to the bathroom before the test.  · Relax for 5 minutes before taking the measurement.  · Sit with your back supported (don't sit on a couch or soft chair); keep your feet on the floor uncrossed. Place your arm on a solid flat surface (like a  table) with the upper part of the arm at heart level. Place the middle of the cuff directly above the eye of the elbow. Check the monitor's instruction manual for an illustration.  · Take multiple readings. When you measure, take 2 to 3 readings one minute apart and record all of the results.  · Take your blood pressure at the same time every day, or as your healthcare provider recommends.  · Record the date, time, and blood pressure reading.  · Take the record with you to your next medical appointment. If your blood pressure monitor has a built-in memory, simply take the monitor with you to your next appointment.  · Call your provider if you have several high readings. Don't be frightened by a single high blood pressure reading, but if you get several high readings, check in with your healthcare provider.  · Note: When blood pressure reaches a systolic (top number) of 180 or higher OR diastolic (bottom number) of 110 or higher, seek emergency medical treatment.  Follow-up care  Keep all of your follow up appointments. If your blood pressure is high (more than 120 over 80) on 2 out of 3 days, you will need to follow up with your healthcare provider for more evaluation and treatment.  Dont put this off! High blood pressure can be treated. High blood pressure thats not treated raises your risk for heart attack and stroke.  When to seek medical advice  Call your healthcare provider right away if any of these occur:  · Blood pressure reaches a systolic (top number) of 180 or higher, OR diastolic (bottom number) of 110 or higher  · Chest pain or shortness of breath  · Severe headache  · Throbbing or rushing sound in the ears  · Nosebleed  · Sudden severe pain in your belly (abdomen)  · Extreme drowsiness, confusion, or fainting  · Dizziness or dizziness with spinning sensation (vertigo)  · Weakness of an arm or leg or one side of the face  · You have problems speaking or seeing   Date Last Reviewed: 12/1/2016  ©  0198-4002 [employer]. [address] [place] [address], [place] [address]. All rights reserved. This information is not intended as a substitute for professional medical care. Always follow your healthcare professional's instructions.

## 2019-09-16 NOTE — PATIENT INSTRUCTIONS
High Blood Pressure, To Be Confirmed, No Treatment  Your blood pressure today was higher than normal. Sometimes anxiety or pain can cause a temporary rise in blood pressure. It later returns to normal. Blood pressure that is high only one time doesnt mean that you have high blood pressure (hypertension). High blood pressure is a chronic illness. But you should have your blood pressure measured again within the next few days to find out if its still high.    A blood pressure reading is made up of two numbers: a higher number over a lower number. The top number is the systolic pressure. The bottom number is the diastolic pressure. A normal blood pressure is a systolic pressure of less than 120 over a diastolic pressure of less than 80. You will see your blood pressure readings written together. For example, a person with a systolic pressure of 118 and a diastolic pressure of 78 will have 118/78 written in the medical record.     High blood pressure is when either the top number is 140 or higher, or the bottom number is 90 or higher. This must be the result when taking your blood pressure a number of times.   The blood pressures between normal and high are called prehypertension. This is systolic pressure of 120 to 140 or diastolic pressure of 80 to 89. Prehypertension means you are at risk of getting high blood pressure. It's a warning sign. The information gives you a chance to  make lifestyle changes such as weight loss, exercise, and quitting smoking, that can keep your blood pressure from going higher. You should have your blood pressure checked regularly to be sure it isnt rising.  Home care  To track your blood pressure, your provider may ask you to come into the office at different times and on different days. If your healthcare provider asks you to check your readings at home, ask him or her what times of the day to test and for how many days. Before you leave the office, ask your provider to show you how  to take your blood pressure and be sure to ask questions if you don't understand something.  Consider buying an automatic blood pressure monitor. Ask your provider for a recommendation. You can buy blood pressure monitors at most pharmacies.  The American Heart Association recommends the following guidelines for home blood pressure monitoring:  · Don't smoke or drink coffee for 30 minutes before taking your blood pressure.  · Go to the bathroom before the test.  · Relax for 5 minutes before taking the measurement.  · Sit with your back supported (don't sit on a couch or soft chair); keep your feet on the floor uncrossed. Place your arm on a solid flat surface (like a table) with the upper part of the arm at heart level. Place the middle of the cuff directly above the eye of the elbow. Check the monitor's instruction manual for an illustration.  · Take multiple readings. When you measure, take 2 to 3 readings one minute apart and record all of the results.  · Take your blood pressure at the same time every day, or as your healthcare provider recommends.  · Record the date, time, and blood pressure reading.  · Take the record with you to your next medical appointment. If your blood pressure monitor has a built-in memory, simply take the monitor with you to your next appointment.  · Call your provider if you have several high readings. Don't be frightened by a single high blood pressure reading, but if you get several high readings, check in with your healthcare provider.  · Note: When blood pressure reaches a systolic (top number) of 180 or higher OR diastolic (bottom number) of 110 or higher, seek emergency medical treatment.  Follow-up care  Keep all of your follow up appointments. If your blood pressure is high (more than 120 over 80) on 2 out of 3 days, you will need to follow up with your healthcare provider for more evaluation and treatment.  Dont put this off! High blood pressure can be treated. High blood  pressure thats not treated raises your risk for heart attack and stroke.  When to seek medical advice  Call your healthcare provider right away if any of these occur:  · Blood pressure reaches a systolic (top number) of 180 or higher, OR diastolic (bottom number) of 110 or higher  · Chest pain or shortness of breath  · Severe headache  · Throbbing or rushing sound in the ears  · Nosebleed  · Sudden severe pain in your belly (abdomen)  · Extreme drowsiness, confusion, or fainting  · Dizziness or dizziness with spinning sensation (vertigo)  · Weakness of an arm or leg or one side of the face  · You have problems speaking or seeing   Date Last Reviewed: 12/1/2016  © 3067-6146 [employer]. [address] [place] [address], [place] [address]. All rights reserved. This information is not intended as a substitute for professional medical care. Always follow your healthcare professional's instructions.

## 2019-09-17 ENCOUNTER — PATIENT MESSAGE (OUTPATIENT)
Dept: INTERNAL MEDICINE | Facility: CLINIC | Age: 74
End: 2019-09-17

## 2019-09-17 DIAGNOSIS — E11.9 TYPE 2 DIABETES MELLITUS WITHOUT COMPLICATION, WITHOUT LONG-TERM CURRENT USE OF INSULIN: ICD-10-CM

## 2019-09-17 DIAGNOSIS — R30.0 DYSURIA: Primary | ICD-10-CM

## 2019-09-17 RX ORDER — LANCETS
EACH MISCELLANEOUS
Qty: 100 EACH | Refills: 3 | Status: SHIPPED | OUTPATIENT
Start: 2019-09-17 | End: 2020-04-20 | Stop reason: SDUPTHER

## 2019-09-17 NOTE — TELEPHONE ENCOUNTER
Spoke with pt recent urgent care not reveiwed   She stopped  hycosamine   Continue home bp monitor and bring to appt  No urine symptoms at this time  Urine in to drop off if concerns  She will call if bp remains up in the next few days to start treatment discussed options revieed

## 2019-09-17 NOTE — TELEPHONE ENCOUNTER
Assist in nurse visit /b12 shot as requessted  Due in September  And monthly thereafter   Urine in for her to do at her next visit as reqeusted

## 2019-09-18 ENCOUNTER — TELEPHONE (OUTPATIENT)
Dept: INTERNAL MEDICINE | Facility: CLINIC | Age: 74
End: 2019-09-18

## 2019-09-18 NOTE — TELEPHONE ENCOUNTER
----- Message from Richi Reyes sent at 9/18/2019  4:05 PM CDT -----  Contact: Self [phone]  Patient is calling to follow up on an nurse visit for B12 injection,m please advise.

## 2019-09-19 ENCOUNTER — TELEPHONE (OUTPATIENT)
Dept: INTERNAL MEDICINE | Facility: CLINIC | Age: 74
End: 2019-09-19

## 2019-09-20 ENCOUNTER — TELEPHONE (OUTPATIENT)
Dept: INTERNAL MEDICINE | Facility: CLINIC | Age: 74
End: 2019-09-20

## 2019-09-20 ENCOUNTER — CLINICAL SUPPORT (OUTPATIENT)
Dept: INTERNAL MEDICINE | Facility: CLINIC | Age: 74
End: 2019-09-20
Payer: MEDICARE

## 2019-09-20 VITALS — SYSTOLIC BLOOD PRESSURE: 144 MMHG | DIASTOLIC BLOOD PRESSURE: 78 MMHG | HEART RATE: 76 BPM

## 2019-09-20 PROCEDURE — 99999 PR PBB SHADOW E&M-EST. PATIENT-LVL I: CPT | Mod: PBBFAC,,,

## 2019-09-20 PROCEDURE — 99211 OFF/OP EST MAY X REQ PHY/QHP: CPT | Mod: PBBFAC

## 2019-09-20 PROCEDURE — 99999 PR PBB SHADOW E&M-EST. PATIENT-LVL I: ICD-10-PCS | Mod: PBBFAC,,,

## 2019-09-20 PROCEDURE — 96372 THER/PROPH/DIAG INJ SC/IM: CPT | Mod: PBBFAC

## 2019-09-20 RX ADMIN — CYANOCOBALAMIN 1000 MCG: 1000 INJECTION, SOLUTION INTRAMUSCULAR; SUBCUTANEOUS at 02:09

## 2019-09-20 NOTE — TELEPHONE ENCOUNTER
Pt came in for BP check. BP today was 144/78 pulse 76  Pt provided a few BP readings from home  9/18/19 /87  9/18/19 /100  9/19/19 /79  9/19/19 /90  9/20/19 /82  Pt says Dr. Montes at [facility] stopped her anastrozole.  PCP also spoke with pt

## 2019-09-20 NOTE — PROGRESS NOTES
Patient two identifiers used. Pt tolerated B12 injection well, asked to wait 15 minutes post injection.    Pt also came in for BP check. BP today was 144/78 pulse 76  Pt provided a few BP readings from home  9/18/19 /87  9/18/19 /100  9/19/19 /79  9/19/19 /90  9/20/19 /82  Pt says Dr. Montes at [facility] stopped her anastrozole.  PCP also spoke with pt

## 2019-09-20 NOTE — TELEPHONE ENCOUNTER
Spoke with pt some bp flucutaions recently no hx elevated bp   bp improving she was taken off arimediex for now by her  Oncology and holding her levsin as well  She will clarify need for levsin with her gi doctor discussed   bp today continues to improve today 144/82 and down   If no normalization by next week will start medication if needed   Will call continue to monitor her readings

## 2019-10-08 ENCOUNTER — PATIENT MESSAGE (OUTPATIENT)
Dept: INTERNAL MEDICINE | Facility: CLINIC | Age: 74
End: 2019-10-08

## 2019-10-09 ENCOUNTER — OFFICE VISIT (OUTPATIENT)
Dept: INTERNAL MEDICINE | Facility: CLINIC | Age: 74
End: 2019-10-09
Payer: MEDICARE

## 2019-10-09 ENCOUNTER — LAB VISIT (OUTPATIENT)
Dept: LAB | Facility: HOSPITAL | Age: 74
End: 2019-10-09
Attending: INTERNAL MEDICINE
Payer: MEDICARE

## 2019-10-09 VITALS
HEIGHT: 64 IN | BODY MASS INDEX: 30.78 KG/M2 | DIASTOLIC BLOOD PRESSURE: 78 MMHG | WEIGHT: 180.31 LBS | SYSTOLIC BLOOD PRESSURE: 139 MMHG

## 2019-10-09 DIAGNOSIS — E11.9 TYPE 2 DIABETES MELLITUS WITHOUT COMPLICATION, WITHOUT LONG-TERM CURRENT USE OF INSULIN: ICD-10-CM

## 2019-10-09 DIAGNOSIS — I10 ESSENTIAL HYPERTENSION: ICD-10-CM

## 2019-10-09 DIAGNOSIS — E11.9 TYPE 2 DIABETES MELLITUS WITHOUT COMPLICATION, WITHOUT LONG-TERM CURRENT USE OF INSULIN: Primary | ICD-10-CM

## 2019-10-09 DIAGNOSIS — E53.8 B12 DEFICIENCY: ICD-10-CM

## 2019-10-09 DIAGNOSIS — Z85.3 HX: BREAST CANCER: ICD-10-CM

## 2019-10-09 DIAGNOSIS — E78.49 OTHER HYPERLIPIDEMIA: ICD-10-CM

## 2019-10-09 LAB
ALBUMIN SERPL BCP-MCNC: 4.5 G/DL (ref 3.5–5.2)
ALP SERPL-CCNC: 70 U/L (ref 55–135)
ALT SERPL W/O P-5'-P-CCNC: 26 U/L (ref 10–44)
ANION GAP SERPL CALC-SCNC: 9 MMOL/L (ref 8–16)
AST SERPL-CCNC: 21 U/L (ref 10–40)
BASOPHILS # BLD AUTO: 0.01 K/UL (ref 0–0.2)
BASOPHILS NFR BLD: 0.2 % (ref 0–1.9)
BILIRUB DIRECT SERPL-MCNC: 0.2 MG/DL (ref 0.1–0.3)
BILIRUB SERPL-MCNC: 0.7 MG/DL (ref 0.1–1)
BUN SERPL-MCNC: 16 MG/DL (ref 8–23)
CALCIUM SERPL-MCNC: 9.5 MG/DL (ref 8.7–10.5)
CHLORIDE SERPL-SCNC: 101 MMOL/L (ref 95–110)
CO2 SERPL-SCNC: 29 MMOL/L (ref 23–29)
CREAT SERPL-MCNC: 0.9 MG/DL (ref 0.5–1.4)
DIFFERENTIAL METHOD: NORMAL
EOSINOPHIL # BLD AUTO: 0 K/UL (ref 0–0.5)
EOSINOPHIL NFR BLD: 0.6 % (ref 0–8)
ERYTHROCYTE [DISTWIDTH] IN BLOOD BY AUTOMATED COUNT: 12.8 % (ref 11.5–14.5)
EST. GFR  (AFRICAN AMERICAN): >60 ML/MIN/1.73 M^2
EST. GFR  (NON AFRICAN AMERICAN): >60 ML/MIN/1.73 M^2
ESTIMATED AVG GLUCOSE: 148 MG/DL (ref 68–131)
GLUCOSE SERPL-MCNC: 123 MG/DL (ref 70–110)
HBA1C MFR BLD HPLC: 6.8 % (ref 4–5.6)
HCT VFR BLD AUTO: 41.7 % (ref 37–48.5)
HGB BLD-MCNC: 13.8 G/DL (ref 12–16)
LYMPHOCYTES # BLD AUTO: 2 K/UL (ref 1–4.8)
LYMPHOCYTES NFR BLD: 31.3 % (ref 18–48)
MCH RBC QN AUTO: 29.1 PG (ref 27–31)
MCHC RBC AUTO-ENTMCNC: 33.1 G/DL (ref 32–36)
MCV RBC AUTO: 88 FL (ref 82–98)
MONOCYTES # BLD AUTO: 0.5 K/UL (ref 0.3–1)
MONOCYTES NFR BLD: 8.2 % (ref 4–15)
NEUTROPHILS # BLD AUTO: 3.8 K/UL (ref 1.8–7.7)
NEUTROPHILS NFR BLD: 59.7 % (ref 38–73)
PLATELET # BLD AUTO: 213 K/UL (ref 150–350)
PMV BLD AUTO: 10.1 FL (ref 9.2–12.9)
POTASSIUM SERPL-SCNC: 4.1 MMOL/L (ref 3.5–5.1)
PROT SERPL-MCNC: 7.5 G/DL (ref 6–8.4)
RBC # BLD AUTO: 4.75 M/UL (ref 4–5.4)
SODIUM SERPL-SCNC: 139 MMOL/L (ref 136–145)
WBC # BLD AUTO: 6.33 K/UL (ref 3.9–12.7)

## 2019-10-09 PROCEDURE — 99999 PR PBB SHADOW E&M-EST. PATIENT-LVL III: ICD-10-PCS | Mod: PBBFAC,,, | Performed by: INTERNAL MEDICINE

## 2019-10-09 PROCEDURE — 80048 BASIC METABOLIC PNL TOTAL CA: CPT

## 2019-10-09 PROCEDURE — 85025 COMPLETE CBC W/AUTO DIFF WBC: CPT

## 2019-10-09 PROCEDURE — 83036 HEMOGLOBIN GLYCOSYLATED A1C: CPT

## 2019-10-09 PROCEDURE — 99214 OFFICE O/P EST MOD 30 MIN: CPT | Mod: S$PBB,,, | Performed by: INTERNAL MEDICINE

## 2019-10-09 PROCEDURE — 99214 PR OFFICE/OUTPT VISIT, EST, LEVL IV, 30-39 MIN: ICD-10-PCS | Mod: S$PBB,,, | Performed by: INTERNAL MEDICINE

## 2019-10-09 PROCEDURE — 99999 PR PBB SHADOW E&M-EST. PATIENT-LVL III: CPT | Mod: PBBFAC,,, | Performed by: INTERNAL MEDICINE

## 2019-10-09 PROCEDURE — 36415 COLL VENOUS BLD VENIPUNCTURE: CPT

## 2019-10-09 PROCEDURE — 99213 OFFICE O/P EST LOW 20 MIN: CPT | Mod: PBBFAC | Performed by: INTERNAL MEDICINE

## 2019-10-09 PROCEDURE — 80076 HEPATIC FUNCTION PANEL: CPT

## 2019-10-09 RX ORDER — AMLODIPINE BESYLATE 5 MG/1
5 TABLET ORAL DAILY
Qty: 30 TABLET | Refills: 6 | Status: SHIPPED | OUTPATIENT
Start: 2019-10-09 | End: 2020-04-21 | Stop reason: SDUPTHER

## 2019-10-09 NOTE — PROGRESS NOTES
"Subjective:       Patient ID: Loni Heard is a 74 y.o. female.    Chief Complaint: Follow-up   This is a 74-year-old who presents today for follow-up she has had some fluctuations in her blood pressure and blood sugar recently.  She has been monitoring her home readings in the went up in they and came back down again but seemed to be creeping back up . She did get of her anastrazole and hycosamine as well   She did call her hem/onc and stoped her medication at that time thinking and may be causing the elevation and she did get some improvement initially.  She denies any increased stress or new symptoms of concern.  She has been finding that blood sugars are running higher as well is concerned that her A1c up and would like to consider increasing medication.  Her home numbers have been up but did not bring her meter .  She still is exercising and trying to watch her diet has been feeling well and early symptom pressures being up.s he denies urinary symptoms or fevers.  She does occasionally get some sores on her scalp but nothing active her draining.  She does continue to get her B12 injections  .  HPI  Review of Systems   Constitutional: Positive for fatigue. Negative for fever.   Respiratory: Negative for shortness of breath.    Gastrointestinal: Negative for abdominal pain and constipation.   Neurological: Positive for headaches. Negative for dizziness.       Objective:     Blood pressure 139/78, height 5' 4" (1.626 m), weight 81.8 kg (180 lb 5.4 oz).    Physical Exam   Constitutional: No distress.   HENT:   Head: Normocephalic.   Mouth/Throat: Oropharynx is clear and moist.   Eyes: No scleral icterus.   Neck: Neck supple.   Cardiovascular: Normal rate, regular rhythm and normal heart sounds. Exam reveals no gallop and no friction rub.   No murmur heard.  Pulmonary/Chest: Effort normal and breath sounds normal. No respiratory distress.   Abdominal: Soft. Bowel sounds are normal. She exhibits no mass. " There is no tenderness.   Musculoskeletal: She exhibits no edema.   Neurological: She is alert.   Skin: No erythema.   Vitals reviewed.      Assessment:       1. Type 2 diabetes mellitus without complication, without long-term current use of insulin    2. Other hyperlipidemia    3. Essential hypertension    4. HX: breast cancer    5. B12 deficiency        Plan:       Loni was seen today for follow-up.    Diagnoses and all orders for this visit:    Type 2 diabetes mellitus without complication, without long-term current use of insulin  She continues to exercise watch her diet but her blood sugars are trending upward at home she would like to adjust her medication upward as well.  -     Basic metabolic panel; Future  -     CBC auto differential; Future  -     Hepatic function panel; Future  -     Hemoglobin A1c; Future  -     Lipid panel; Future    Other hyperlipidemia  She has declined statins remains on Zetia    Essential hypertension  Blood pressure has been trending up patient has discussed with her hematologist and was recently taken off her anastrazole  She had some improvement with her blood pressures but they now continues to trend back up we discussed medication options she is agreeable    HX: breast cancer  She continues to follow with her outlying hematologist oncologist    B12 deficiency  She continues B12 injections    Other orders  -     SITagliptin (JANUVIA) 100 MG Tab; Take 1 tablet (100 mg total) by mouth once daily.  -     amLODIPine (NORVASC) 5 MG tablet; Take 1 tablet (5 mg total) by mouth once daily.    She will turn for regular recheck as planned continue home blood pressure monitoring  Discussed digital htn/diabetes programs she will consider

## 2019-10-10 ENCOUNTER — TELEPHONE (OUTPATIENT)
Dept: INTERNAL MEDICINE | Facility: CLINIC | Age: 74
End: 2019-10-10

## 2019-10-10 NOTE — TELEPHONE ENCOUNTER
----- Message from Lu Rivera sent at 10/10/2019  2:19 PM CDT -----  Contact: Kerrie stubbs [facility] [phone]  [facility] [address]  They want to know if patient is taking Insulin or not?

## 2019-10-18 ENCOUNTER — CLINICAL SUPPORT (OUTPATIENT)
Dept: INTERNAL MEDICINE | Facility: CLINIC | Age: 74
End: 2019-10-18
Payer: MEDICARE

## 2019-10-18 PROCEDURE — 96372 THER/PROPH/DIAG INJ SC/IM: CPT | Mod: PBBFAC

## 2019-10-18 RX ORDER — EZETIMIBE 10 MG/1
TABLET ORAL
Qty: 30 TABLET | Refills: 9 | Status: SHIPPED | OUTPATIENT
Start: 2019-10-18 | End: 2020-01-18 | Stop reason: SDUPTHER

## 2019-10-18 RX ADMIN — CYANOCOBALAMIN 1000 MCG: 1000 INJECTION, SOLUTION INTRAMUSCULAR; SUBCUTANEOUS at 09:10

## 2019-10-18 NOTE — PROGRESS NOTES
Pt came in for B12 injection. She brought her own vials. Gave injection in Left arm. Pt tolerated injection

## 2019-10-22 ENCOUNTER — OFFICE VISIT (OUTPATIENT)
Dept: URGENT CARE | Facility: CLINIC | Age: 74
End: 2019-10-22
Payer: MEDICARE

## 2019-10-22 VITALS
TEMPERATURE: 97 F | DIASTOLIC BLOOD PRESSURE: 73 MMHG | HEART RATE: 75 BPM | OXYGEN SATURATION: 97 % | SYSTOLIC BLOOD PRESSURE: 151 MMHG | WEIGHT: 180 LBS | RESPIRATION RATE: 18 BRPM | BODY MASS INDEX: 30.9 KG/M2

## 2019-10-22 DIAGNOSIS — S01.01XA SCALP LACERATION, INITIAL ENCOUNTER: Primary | ICD-10-CM

## 2019-10-22 PROCEDURE — 12001 RPR S/N/AX/GEN/TRNK 2.5CM/<: CPT | Mod: S$GLB,,, | Performed by: SURGERY

## 2019-10-22 PROCEDURE — 99214 PR OFFICE/OUTPT VISIT, EST, LEVL IV, 30-39 MIN: ICD-10-PCS | Mod: 25,S$GLB,, | Performed by: SURGERY

## 2019-10-22 PROCEDURE — 12001 LACERATION REPAIR: ICD-10-PCS | Mod: S$GLB,,, | Performed by: SURGERY

## 2019-10-22 PROCEDURE — 99214 OFFICE O/P EST MOD 30 MIN: CPT | Mod: 25,S$GLB,, | Performed by: SURGERY

## 2019-10-22 NOTE — PROGRESS NOTES
Subjective:       Patient ID: Loni Heard is a 74 y.o. female.    Vitals:  weight is 81.6 kg (180 lb). Her temperature is 97.2 °F (36.2 °C). Her blood pressure is 151/73 (abnormal) and her pulse is 75. Her respiration is 18 and oxygen saturation is 97%.     Chief Complaint: Head Injury    Head laceration due to a pot falling on her head    Head Injury    The incident occurred 1 to 3 hours ago. The pain is at a severity of 0/10. The patient is experiencing no pain. Pertinent negatives include no blurred vision or weakness. Treatments tried: alcohol. The treatment provided no relief.       Constitution: Negative for fatigue.   HENT: Negative for facial swelling and facial trauma.    Neck: Negative for neck stiffness.   Cardiovascular: Negative for chest trauma.   Eyes: Negative for eye trauma, double vision and blurred vision.   Gastrointestinal: Negative for abdominal trauma, abdominal pain and rectal bleeding.   Genitourinary: Negative for hematuria, missed menses, genital trauma and pelvic pain.   Musculoskeletal: Negative for pain, trauma, joint swelling and abnormal ROM of joint.   Skin: Positive for laceration. Negative for color change, wound, abrasion, erythema and bruising.   Neurological: Negative for dizziness, history of vertigo, light-headedness, coordination disturbances, altered mental status and loss of consciousness.   Hematologic/Lymphatic: Negative for history of bleeding disorder.   Psychiatric/Behavioral: Negative for altered mental status.       Objective:      Physical Exam   Constitutional: She is oriented to person, place, and time. She appears well-developed and well-nourished.   HENT:   Head: Normocephalic. Head is with laceration. Head is without abrasion and without contusion.       Right Ear: External ear normal.   Left Ear: External ear normal.   Nose: Nose normal.   Mouth/Throat: Oropharynx is clear and moist and mucous membranes are normal.   Eyes: Pupils are equal, round,  and reactive to light. Conjunctivae, EOM and lids are normal.   Neck: Trachea normal, full passive range of motion without pain and phonation normal. Neck supple.   Cardiovascular: Normal rate, regular rhythm and normal heart sounds.   Pulmonary/Chest: Effort normal and breath sounds normal. No stridor. No respiratory distress.   Musculoskeletal: Normal range of motion.   Neurological: She is alert and oriented to person, place, and time.   Skin: Skin is warm, dry, intact and no rash. Capillary refill takes less than 2 seconds. abrasion, burn, bruising, erythema and ecchymosis  Psychiatric: She has a normal mood and affect. Her speech is normal and behavior is normal. Judgment and thought content normal. Cognition and memory are normal.   Nursing note and vitals reviewed.        Assessment:       1. Scalp laceration, initial encounter        Plan:         Scalp laceration, initial encounter  -     Laceration Repair      Patient Instructions     Laceration: Skin Adhesive  A laceration is a cut through the skin. You have a laceration that your healthcare provider has closed with skin adhesive, a type of skin glue.  Home care  You may take acetaminophen or ibuprofen for pain, unless your provider prescribed another pain medicine.  If you have chronic liver or kidney disease or ever had a stomach ulcer or gastrointestinal bleeding, talk with your healthcare provider before using these medicines.  General care  · Keep the wound clean and dry. You may shower or bathe as usual, but do not use soaps, lotions, or ointments on the wound area. Do not scrub the wound. After bathing, pat the wound dry with a soft towel.  · Do not scratch, rub, or pick at the adhesive film. Do not place tape directly over the film.  · Do not apply liquids (such as peroxide), ointments, or creams to the wound while the film is in place.  · Most skin wounds heal without problems. However, an infection sometimes occurs despite proper treatment.  Therefore, watch for the signs of infection listed below.  Follow-up care  Follow up with your healthcare provider, or as advised. The adhesive film will fall off in 5 to 7 days.  When to seek medical advice  Call your healthcare provider right away if any of these occur:  · Signs of infection:  ¨ Fever of 100.4ºF (38ºC) or higher, or as advised by your healthcare provider  ¨ Increasing pain in the wound  ¨ Increasing redness or swelling  ¨ Pus coming from the wound  · Wound bleeds more than a small amount or bleeding doesnt stop  · Glue comes off earlier than expected and the wound edges come apart  · You feel numbness or weakness in the wound area that doesnt go away  Date Last Reviewed: 6/1/2016  © 8834-9867 [employer]. [address] [place] [address], [place] [address]. All rights reserved. This information is not intended as a substitute for professional medical care. Always follow your healthcare professional's instructions.

## 2019-10-22 NOTE — PATIENT INSTRUCTIONS
Laceration: Skin Adhesive  A laceration is a cut through the skin. You have a laceration that your healthcare provider has closed with skin adhesive, a type of skin glue.  Home care  You may take acetaminophen or ibuprofen for pain, unless your provider prescribed another pain medicine.  If you have chronic liver or kidney disease or ever had a stomach ulcer or gastrointestinal bleeding, talk with your healthcare provider before using these medicines.  General care  · Keep the wound clean and dry. You may shower or bathe as usual, but do not use soaps, lotions, or ointments on the wound area. Do not scrub the wound. After bathing, pat the wound dry with a soft towel.  · Do not scratch, rub, or pick at the adhesive film. Do not place tape directly over the film.  · Do not apply liquids (such as peroxide), ointments, or creams to the wound while the film is in place.  · Most skin wounds heal without problems. However, an infection sometimes occurs despite proper treatment. Therefore, watch for the signs of infection listed below.  Follow-up care  Follow up with your healthcare provider, or as advised. The adhesive film will fall off in 5 to 7 days.  When to seek medical advice  Call your healthcare provider right away if any of these occur:  · Signs of infection:  ¨ Fever of 100.4ºF (38ºC) or higher, or as advised by your healthcare provider  ¨ Increasing pain in the wound  ¨ Increasing redness or swelling  ¨ Pus coming from the wound  · Wound bleeds more than a small amount or bleeding doesnt stop  · Glue comes off earlier than expected and the wound edges come apart  · You feel numbness or weakness in the wound area that doesnt go away  Date Last Reviewed: 6/1/2016  © 7245-8243 [employer]. [address] [place] [address], [place] [address]. All rights reserved. This information is not intended as a substitute for professional medical care. Always follow your healthcare professional's instructions.

## 2019-10-22 NOTE — PROCEDURES
Laceration Repair  Date/Time: 10/22/2019 3:30 PM  Performed by: Cassandra Dolan MD  Authorized by: Cassandra Dolan MD   Consent Done: Not Needed  Body area: head/neck  Location details: scalp  Laceration length: 2 cm  Foreign bodies: no foreign bodies  Tendon involvement: none  Nerve involvement: none  Vascular damage: no    Anesthesia:  Local anesthesia used: no  Patient sedated: no  Irrigation solution: saline  Irrigation method: syringe  Amount of cleaning: standard  Debridement: none  Degree of undermining: none  Skin closure: glue  Dressing: open (no dressing)  Comments: Good approximation with the use of Dermabond.  No bleeding after placing Dermabond.  Patient tolerated well instructions given.

## 2019-10-25 ENCOUNTER — TELEPHONE (OUTPATIENT)
Dept: URGENT CARE | Facility: CLINIC | Age: 74
End: 2019-10-25

## 2019-11-18 ENCOUNTER — CLINICAL SUPPORT (OUTPATIENT)
Dept: INTERNAL MEDICINE | Facility: CLINIC | Age: 74
End: 2019-11-18
Payer: MEDICARE

## 2019-11-18 PROCEDURE — 96372 THER/PROPH/DIAG INJ SC/IM: CPT | Mod: PBBFAC

## 2019-11-18 PROCEDURE — 90662 IIV NO PRSV INCREASED AG IM: CPT | Mod: PBBFAC

## 2019-11-18 RX ADMIN — CYANOCOBALAMIN 1000 MCG: 1000 INJECTION, SOLUTION INTRAMUSCULAR; SUBCUTANEOUS at 09:11

## 2019-12-06 ENCOUNTER — LAB VISIT (OUTPATIENT)
Dept: LAB | Facility: HOSPITAL | Age: 74
End: 2019-12-06
Attending: INTERNAL MEDICINE
Payer: MEDICARE

## 2019-12-06 DIAGNOSIS — E11.9 TYPE 2 DIABETES MELLITUS WITHOUT COMPLICATION, WITHOUT LONG-TERM CURRENT USE OF INSULIN: ICD-10-CM

## 2019-12-06 DIAGNOSIS — E53.8 B12 DEFICIENCY: ICD-10-CM

## 2019-12-06 DIAGNOSIS — E55.9 VITAMIN D DEFICIENCY: ICD-10-CM

## 2019-12-06 LAB
25(OH)D3+25(OH)D2 SERPL-MCNC: 46 NG/ML (ref 30–96)
ALBUMIN SERPL BCP-MCNC: 4.2 G/DL (ref 3.5–5.2)
ALP SERPL-CCNC: 72 U/L (ref 55–135)
ALT SERPL W/O P-5'-P-CCNC: 27 U/L (ref 10–44)
ANION GAP SERPL CALC-SCNC: 9 MMOL/L (ref 8–16)
AST SERPL-CCNC: 22 U/L (ref 10–40)
BASOPHILS # BLD AUTO: 0.01 K/UL (ref 0–0.2)
BASOPHILS NFR BLD: 0.2 % (ref 0–1.9)
BILIRUB DIRECT SERPL-MCNC: 0.2 MG/DL (ref 0.1–0.3)
BILIRUB SERPL-MCNC: 0.5 MG/DL (ref 0.1–1)
BUN SERPL-MCNC: 16 MG/DL (ref 8–23)
CALCIUM SERPL-MCNC: 9.9 MG/DL (ref 8.7–10.5)
CHLORIDE SERPL-SCNC: 102 MMOL/L (ref 95–110)
CHOLEST SERPL-MCNC: 232 MG/DL (ref 120–199)
CHOLEST/HDLC SERPL: 3.7 {RATIO} (ref 2–5)
CO2 SERPL-SCNC: 29 MMOL/L (ref 23–29)
CREAT SERPL-MCNC: 1.1 MG/DL (ref 0.5–1.4)
DIFFERENTIAL METHOD: NORMAL
EOSINOPHIL # BLD AUTO: 0.1 K/UL (ref 0–0.5)
EOSINOPHIL NFR BLD: 1.4 % (ref 0–8)
ERYTHROCYTE [DISTWIDTH] IN BLOOD BY AUTOMATED COUNT: 12.6 % (ref 11.5–14.5)
EST. GFR  (AFRICAN AMERICAN): 57 ML/MIN/1.73 M^2
EST. GFR  (NON AFRICAN AMERICAN): 49 ML/MIN/1.73 M^2
ESTIMATED AVG GLUCOSE: 151 MG/DL (ref 68–131)
GLUCOSE SERPL-MCNC: 127 MG/DL (ref 70–110)
HBA1C MFR BLD HPLC: 6.9 % (ref 4–5.6)
HCT VFR BLD AUTO: 40.2 % (ref 37–48.5)
HDLC SERPL-MCNC: 63 MG/DL (ref 40–75)
HDLC SERPL: 27.2 % (ref 20–50)
HGB BLD-MCNC: 13.3 G/DL (ref 12–16)
LDLC SERPL CALC-MCNC: 148 MG/DL (ref 63–159)
LYMPHOCYTES # BLD AUTO: 1.9 K/UL (ref 1–4.8)
LYMPHOCYTES NFR BLD: 32.3 % (ref 18–48)
MCH RBC QN AUTO: 29.2 PG (ref 27–31)
MCHC RBC AUTO-ENTMCNC: 33.1 G/DL (ref 32–36)
MCV RBC AUTO: 88 FL (ref 82–98)
MONOCYTES # BLD AUTO: 0.6 K/UL (ref 0.3–1)
MONOCYTES NFR BLD: 9.7 % (ref 4–15)
NEUTROPHILS # BLD AUTO: 3.2 K/UL (ref 1.8–7.7)
NEUTROPHILS NFR BLD: 56.4 % (ref 38–73)
NONHDLC SERPL-MCNC: 169 MG/DL
PLATELET # BLD AUTO: 222 K/UL (ref 150–350)
PMV BLD AUTO: 10.6 FL (ref 9.2–12.9)
POTASSIUM SERPL-SCNC: 5.2 MMOL/L (ref 3.5–5.1)
PROT SERPL-MCNC: 7.1 G/DL (ref 6–8.4)
RBC # BLD AUTO: 4.56 M/UL (ref 4–5.4)
SODIUM SERPL-SCNC: 140 MMOL/L (ref 136–145)
TRIGL SERPL-MCNC: 105 MG/DL (ref 30–150)
VIT B12 SERPL-MCNC: 967 PG/ML (ref 210–950)
WBC # BLD AUTO: 5.76 K/UL (ref 3.9–12.7)

## 2019-12-06 PROCEDURE — 83036 HEMOGLOBIN GLYCOSYLATED A1C: CPT

## 2019-12-06 PROCEDURE — 82607 VITAMIN B-12: CPT

## 2019-12-06 PROCEDURE — 36415 COLL VENOUS BLD VENIPUNCTURE: CPT

## 2019-12-06 PROCEDURE — 80048 BASIC METABOLIC PNL TOTAL CA: CPT

## 2019-12-06 PROCEDURE — 80076 HEPATIC FUNCTION PANEL: CPT

## 2019-12-06 PROCEDURE — 85025 COMPLETE CBC W/AUTO DIFF WBC: CPT

## 2019-12-06 PROCEDURE — 82306 VITAMIN D 25 HYDROXY: CPT

## 2019-12-06 PROCEDURE — 80061 LIPID PANEL: CPT

## 2019-12-16 ENCOUNTER — OFFICE VISIT (OUTPATIENT)
Dept: INTERNAL MEDICINE | Facility: CLINIC | Age: 74
End: 2019-12-16
Payer: MEDICARE

## 2019-12-16 VITALS
BODY MASS INDEX: 31.05 KG/M2 | DIASTOLIC BLOOD PRESSURE: 74 MMHG | HEIGHT: 64 IN | WEIGHT: 181.88 LBS | SYSTOLIC BLOOD PRESSURE: 122 MMHG

## 2019-12-16 DIAGNOSIS — Z85.3 HX: BREAST CANCER: ICD-10-CM

## 2019-12-16 DIAGNOSIS — E78.49 OTHER HYPERLIPIDEMIA: ICD-10-CM

## 2019-12-16 DIAGNOSIS — N28.9 RENAL INSUFFICIENCY: ICD-10-CM

## 2019-12-16 DIAGNOSIS — E55.9 VITAMIN D DEFICIENCY: ICD-10-CM

## 2019-12-16 DIAGNOSIS — E53.8 B12 DEFICIENCY: ICD-10-CM

## 2019-12-16 DIAGNOSIS — C50.912 MALIGNANT NEOPLASM OF LEFT FEMALE BREAST, UNSPECIFIED ESTROGEN RECEPTOR STATUS, UNSPECIFIED SITE OF BREAST: ICD-10-CM

## 2019-12-16 DIAGNOSIS — E11.9 TYPE 2 DIABETES MELLITUS WITHOUT COMPLICATION, WITHOUT LONG-TERM CURRENT USE OF INSULIN: Primary | ICD-10-CM

## 2019-12-16 PROCEDURE — 99999 PR PBB SHADOW E&M-EST. PATIENT-LVL III: ICD-10-PCS | Mod: PBBFAC,,, | Performed by: INTERNAL MEDICINE

## 2019-12-16 PROCEDURE — 1159F MED LIST DOCD IN RCRD: CPT | Mod: ,,, | Performed by: INTERNAL MEDICINE

## 2019-12-16 PROCEDURE — 99213 OFFICE O/P EST LOW 20 MIN: CPT | Mod: PBBFAC | Performed by: INTERNAL MEDICINE

## 2019-12-16 PROCEDURE — 99214 OFFICE O/P EST MOD 30 MIN: CPT | Mod: S$PBB,,, | Performed by: INTERNAL MEDICINE

## 2019-12-16 PROCEDURE — 1159F PR MEDICATION LIST DOCUMENTED IN MEDICAL RECORD: ICD-10-PCS | Mod: ,,, | Performed by: INTERNAL MEDICINE

## 2019-12-16 PROCEDURE — 1126F AMNT PAIN NOTED NONE PRSNT: CPT | Mod: ,,, | Performed by: INTERNAL MEDICINE

## 2019-12-16 PROCEDURE — 99214 PR OFFICE/OUTPT VISIT, EST, LEVL IV, 30-39 MIN: ICD-10-PCS | Mod: S$PBB,,, | Performed by: INTERNAL MEDICINE

## 2019-12-16 PROCEDURE — 96372 THER/PROPH/DIAG INJ SC/IM: CPT | Mod: PBBFAC

## 2019-12-16 PROCEDURE — 99999 PR PBB SHADOW E&M-EST. PATIENT-LVL III: CPT | Mod: PBBFAC,,, | Performed by: INTERNAL MEDICINE

## 2019-12-16 PROCEDURE — 1126F PR PAIN SEVERITY QUANTIFIED, NO PAIN PRESENT: ICD-10-PCS | Mod: ,,, | Performed by: INTERNAL MEDICINE

## 2019-12-16 RX ORDER — LETROZOLE 2.5 MG/1
2.5 TABLET, FILM COATED ORAL
COMMUNITY
Start: 2019-10-21 | End: 2020-10-20

## 2019-12-16 RX ADMIN — CYANOCOBALAMIN 1000 MCG: 1000 INJECTION, SOLUTION INTRAMUSCULAR; SUBCUTANEOUS at 10:12

## 2019-12-16 NOTE — PROGRESS NOTES
"Subjective:       Patient ID: Loni Heard is a 74 y.o. female.    Chief Complaint: Follow-up    this is a 74-year-old who presents today for follow up.  Patient reports that she has been having issues with her blood pressure since she was taking anastrazole  From her hematologist oncologist.  We gave her prescription for amlodipine to start and she took it for a few days but discussed with her hematologist she did get off of her anastrozole and is now taking letrozole instead at a low dose and seems to be tolerating it she does have some joint discomfort so has been taking a bit more anti-inflammatory medications recently.  She continues to take vitamin-D usually 2-4000 daily.  Levels have been maintains.  She is getting her B12 injections as well without difficulty.  She continues to have some discomfort at times in the breast stinging discomfort on occasion she follows with her hematologist oncologist breast surgeon later this month.  She has kicked upper exercise as well swimming and doing some weight training was having some discomfort in the left shoulder area which has improved.     HPI  Review of Systems   Constitutional:        Exercising swimming and weight training    Respiratory:        Burning discomfort breast at times  Continues to follow with heme/onc    Genitourinary: Negative for dysuria and urgency.       Objective:     Blood pressure 122/74, height 5' 4" (1.626 m), weight 82.5 kg (181 lb 14.1 oz).    Physical Exam   Constitutional: No distress.   HENT:   Head: Normocephalic.   Mouth/Throat: Oropharynx is clear and moist.   Eyes: No scleral icterus.   Neck: Neck supple.   Cardiovascular: Normal rate, regular rhythm and normal heart sounds. Exam reveals no gallop and no friction rub.   No murmur heard.  Pulmonary/Chest: Effort normal and breath sounds normal. No respiratory distress.   Post surgical changes left breast   Scar   Abdominal: Soft. Bowel sounds are normal. She exhibits no " mass. There is no tenderness.   Musculoskeletal: She exhibits no edema.   Neurological: She is alert.   Skin: No erythema.   Psychiatric: She has a normal mood and affect.   Vitals reviewed.      Assessment:       1. Type 2 diabetes mellitus without complication, without long-term current use of insulin    2. B12 deficiency    3. HX: breast cancer    4. Other hyperlipidemia    5. Malignant neoplasm of left female breast, unspecified estrogen receptor status, unspecified site of breast    6. Renal insufficiency    7. Vitamin D deficiency        Plan:       Loni was seen today for follow-up.    Diagnoses and all orders for this visit:    Type 2 diabetes mellitus without complication, without long-term current use of insulin  Continue exercise dietary measures and she remains on januvia   -     Microalbumin/creatinine urine ratio; Future  -     Basic metabolic panel; Future  -     Hemoglobin A1c; Future  -     Hepatic function panel; Future    B12 deficiency  History of she is due for her B12 injection will get that today than continue monthly      Other hyperlipidemia  Remains on zetia has declined statins     Vitamin-D deficiency continue vitamin-D she is taking 4065-4495 daily  With recent levels acceptble     Hx breast cacner   Malignant neoplasm of left female breast, unspecified estrogen receptor status, unspecified site of breast  She continues to follow with hematologist oncologist in her outlying breast surgeon she most recently was placed on letrozole     Renal insufficiency  Urine microalbumin  We discussed hydration and getting back off anti-inflammatory medications and discussed lower dietary potassium  she reports was taking anti inflammatory medication  more regularly recently due to joint inflammation    Hx elevated bp normalized at home since she switched her hem/once medication   encoruaged home monitring resume amlodipine if elevated discussed     Follow up 4 months

## 2020-01-15 ENCOUNTER — PATIENT MESSAGE (OUTPATIENT)
Dept: PRIMARY CARE CLINIC | Facility: CLINIC | Age: 75
End: 2020-01-15

## 2020-01-17 ENCOUNTER — CLINICAL SUPPORT (OUTPATIENT)
Dept: INTERNAL MEDICINE | Facility: CLINIC | Age: 75
End: 2020-01-17
Payer: MEDICARE

## 2020-01-17 PROCEDURE — 99999 PR PBB SHADOW E&M-EST. PATIENT-LVL I: ICD-10-PCS | Mod: PBBFAC,,,

## 2020-01-17 PROCEDURE — 99999 PR PBB SHADOW E&M-EST. PATIENT-LVL I: CPT | Mod: PBBFAC,,,

## 2020-01-17 PROCEDURE — 99211 OFF/OP EST MAY X REQ PHY/QHP: CPT | Mod: PBBFAC,25

## 2020-01-17 PROCEDURE — 96372 THER/PROPH/DIAG INJ SC/IM: CPT | Mod: PBBFAC

## 2020-01-17 RX ADMIN — CYANOCOBALAMIN 1000 MCG: 1000 INJECTION, SOLUTION INTRAMUSCULAR; SUBCUTANEOUS at 10:01

## 2020-01-17 NOTE — PROGRESS NOTES
Pt inf for monthly B12 injection. Tolerated well. Pt ask to wait 15 mins after injection. Pt supplied medication.

## 2020-01-18 RX ORDER — EZETIMIBE 10 MG/1
TABLET ORAL
Qty: 30 TABLET | Refills: 9 | Status: SHIPPED | OUTPATIENT
Start: 2020-01-18 | End: 2021-02-16 | Stop reason: SDUPTHER

## 2020-01-20 RX ORDER — CYANOCOBALAMIN 1000 UG/ML
1000 INJECTION, SOLUTION INTRAMUSCULAR; SUBCUTANEOUS
Status: DISCONTINUED | OUTPATIENT
Start: 2020-02-17 | End: 2021-09-20

## 2020-01-23 NOTE — TELEPHONE ENCOUNTER
----- Message from Alisha Collins sent at 9/19/2019  3:26 PM CDT -----  Contact: self/ [phone]  Patient is returning a phone call.  Who left a message for the patient: Rocio Plummer LPN   Does patient know what this is regarding:  B12 injection  Comments:   
Spoke to pt previously   
No pertinent past medical history

## 2020-02-17 ENCOUNTER — CLINICAL SUPPORT (OUTPATIENT)
Dept: INTERNAL MEDICINE | Facility: CLINIC | Age: 75
End: 2020-02-17
Payer: MEDICARE

## 2020-02-17 PROCEDURE — 96372 THER/PROPH/DIAG INJ SC/IM: CPT | Mod: PBBFAC

## 2020-02-17 PROCEDURE — 99212 OFFICE O/P EST SF 10 MIN: CPT | Mod: PBBFAC

## 2020-02-17 PROCEDURE — 99999 PR PBB SHADOW E&M-EST. PATIENT-LVL II: CPT | Mod: PBBFAC,,,

## 2020-02-17 PROCEDURE — 99999 PR PBB SHADOW E&M-EST. PATIENT-LVL II: ICD-10-PCS | Mod: PBBFAC,,,

## 2020-02-17 RX ADMIN — CYANOCOBALAMIN 1000 MCG: 1000 INJECTION, SOLUTION INTRAMUSCULAR at 10:02

## 2020-04-13 ENCOUNTER — PATIENT MESSAGE (OUTPATIENT)
Dept: ADMINISTRATIVE | Facility: OTHER | Age: 75
End: 2020-04-13

## 2020-04-13 ENCOUNTER — LAB VISIT (OUTPATIENT)
Dept: LAB | Facility: HOSPITAL | Age: 75
End: 2020-04-13
Attending: INTERNAL MEDICINE
Payer: MEDICARE

## 2020-04-13 DIAGNOSIS — E11.9 TYPE 2 DIABETES MELLITUS WITHOUT COMPLICATION, WITHOUT LONG-TERM CURRENT USE OF INSULIN: ICD-10-CM

## 2020-04-13 LAB
ALBUMIN SERPL BCP-MCNC: 4 G/DL (ref 3.5–5.2)
ALP SERPL-CCNC: 82 U/L (ref 55–135)
ALT SERPL W/O P-5'-P-CCNC: 25 U/L (ref 10–44)
ANION GAP SERPL CALC-SCNC: 9 MMOL/L (ref 8–16)
AST SERPL-CCNC: 23 U/L (ref 10–40)
BILIRUB DIRECT SERPL-MCNC: 0.2 MG/DL (ref 0.1–0.3)
BILIRUB SERPL-MCNC: 0.6 MG/DL (ref 0.1–1)
BUN SERPL-MCNC: 14 MG/DL (ref 8–23)
CALCIUM SERPL-MCNC: 9.9 MG/DL (ref 8.7–10.5)
CHLORIDE SERPL-SCNC: 104 MMOL/L (ref 95–110)
CO2 SERPL-SCNC: 28 MMOL/L (ref 23–29)
CREAT SERPL-MCNC: 0.9 MG/DL (ref 0.5–1.4)
EST. GFR  (AFRICAN AMERICAN): >60 ML/MIN/1.73 M^2
EST. GFR  (NON AFRICAN AMERICAN): >60 ML/MIN/1.73 M^2
ESTIMATED AVG GLUCOSE: 143 MG/DL (ref 68–131)
GLUCOSE SERPL-MCNC: 152 MG/DL (ref 70–110)
HBA1C MFR BLD HPLC: 6.6 % (ref 4–5.6)
POTASSIUM SERPL-SCNC: 4.3 MMOL/L (ref 3.5–5.1)
PROT SERPL-MCNC: 7.3 G/DL (ref 6–8.4)
SODIUM SERPL-SCNC: 141 MMOL/L (ref 136–145)

## 2020-04-13 PROCEDURE — 83036 HEMOGLOBIN GLYCOSYLATED A1C: CPT

## 2020-04-13 PROCEDURE — 80076 HEPATIC FUNCTION PANEL: CPT

## 2020-04-13 PROCEDURE — 80048 BASIC METABOLIC PNL TOTAL CA: CPT

## 2020-04-13 PROCEDURE — 36415 COLL VENOUS BLD VENIPUNCTURE: CPT

## 2020-04-16 ENCOUNTER — PATIENT MESSAGE (OUTPATIENT)
Dept: INTERNAL MEDICINE | Facility: CLINIC | Age: 75
End: 2020-04-16

## 2020-04-17 ENCOUNTER — TELEPHONE (OUTPATIENT)
Dept: INTERNAL MEDICINE | Facility: CLINIC | Age: 75
End: 2020-04-17

## 2020-04-17 NOTE — TELEPHONE ENCOUNTER
----- Message from Lurdes Youngblood sent at 4/17/2020  3:06 PM CDT -----  Contact: Corina [phone]  Patient is requesting a call back

## 2020-04-20 ENCOUNTER — OFFICE VISIT (OUTPATIENT)
Dept: INTERNAL MEDICINE | Facility: CLINIC | Age: 75
End: 2020-04-20
Payer: MEDICARE

## 2020-04-20 ENCOUNTER — PATIENT MESSAGE (OUTPATIENT)
Dept: INTERNAL MEDICINE | Facility: CLINIC | Age: 75
End: 2020-04-20

## 2020-04-20 VITALS — SYSTOLIC BLOOD PRESSURE: 136 MMHG | DIASTOLIC BLOOD PRESSURE: 81 MMHG

## 2020-04-20 DIAGNOSIS — Z17.0 MALIGNANT NEOPLASM OF LOWER-OUTER QUADRANT OF LEFT BREAST OF FEMALE, ESTROGEN RECEPTOR POSITIVE: ICD-10-CM

## 2020-04-20 DIAGNOSIS — I10 ESSENTIAL HYPERTENSION: ICD-10-CM

## 2020-04-20 DIAGNOSIS — C50.512 MALIGNANT NEOPLASM OF LOWER-OUTER QUADRANT OF LEFT BREAST OF FEMALE, ESTROGEN RECEPTOR POSITIVE: ICD-10-CM

## 2020-04-20 DIAGNOSIS — E11.9 TYPE 2 DIABETES MELLITUS WITHOUT COMPLICATION, WITHOUT LONG-TERM CURRENT USE OF INSULIN: Primary | ICD-10-CM

## 2020-04-20 DIAGNOSIS — Z85.3 HX: BREAST CANCER: ICD-10-CM

## 2020-04-20 PROCEDURE — 99443 PR PHYSICIAN TELEPHONE EVALUATION 21-30 MIN: ICD-10-PCS | Mod: 95,,, | Performed by: INTERNAL MEDICINE

## 2020-04-20 PROCEDURE — 99443 PR PHYSICIAN TELEPHONE EVALUATION 21-30 MIN: CPT | Mod: 95,,, | Performed by: INTERNAL MEDICINE

## 2020-04-20 RX ORDER — LANCETS
EACH MISCELLANEOUS
Qty: 100 EACH | Refills: 3 | Status: SHIPPED | OUTPATIENT
Start: 2020-04-20 | End: 2020-08-25 | Stop reason: SDUPTHER

## 2020-04-20 RX ORDER — CYANOCOBALAMIN 1000 UG/ML
INJECTION, SOLUTION INTRAMUSCULAR; SUBCUTANEOUS
Qty: 10 ML | Refills: 6 | Status: SHIPPED | OUTPATIENT
Start: 2020-04-20 | End: 2021-04-22 | Stop reason: SDUPTHER

## 2020-04-20 NOTE — PROGRESS NOTES
Subjective:       Patient ID: Loni Heard is a 74 y.o. female.    Chief Complaint: Follow-up  The patient location is: home  The chief complaint leading to consultation is: follow up   Visit type: audio only  Total time spent with patient: 23 minutes   Each patient to whom he or she provides medical services by telemedicine is:  (1) informed of the relationship between the physician and patient and the respective role of any other health care provider with respect to management of the patient; and (2) notified that he or she may decline to receive medical services by telemedicine and may withdraw from such care at any time.    Notes:   This is a 74-year-old who presents today for follow-up she has not able to exercise with COVID precautions because she [place] land with [place] as form of her regular exercise she has tried to do a bit of activity at home or walking but not to the degree she was doing before so she has noticed that her weight has trended up a bit.  She reports that her blood pressure had been fluctuant since she was placed on medications by hematology oncology for her breast cancer she was initially on anastrozole but then switched to letrozole.  She reports she was started on 4 tablets a week but her pressure was still running high so she dropped to twice a week she has a follow-up appoint with her hematologist oncologist today and reports her blood pressure has been better more in the 130 range but she is not sure if she will stick with the medicine her stop it she does continue to monitor her blood pressure and hopes to get back to her exercise when she is able  She continues to get B12 and plans to get it tomorrow at the pharmacy.  She does need a new prescription sent to primary care pharmacy     She also needs a prescription for her Lantus at since to her [facility]    [facility]  Review of Systems   Constitutional:        Weight up a bit not able to swim currently     Cardiovascular:        Some bp fluctuations on her cancer medications  Now on letrozole a few times a week         Objective:     Blood pressure 136/81.  blood sugar 130-150 depending on meals   Audio visit only   Physical Exam    Assessment:       1. Type 2 diabetes mellitus without complication, without long-term current use of insulin    2. HX: breast cancer    3. Essential hypertension    4. Malignant neoplasm of lower-outer quadrant of left breast of female, estrogen receptor positive        Plan:       Loni was seen today for follow-up.    Diagnoses and all orders for this visit:    Type 2 diabetes mellitus without complication, without long-term current use of insulin  Recent A1c acceptable in some improvement continue current regimen exercise when she is able to get back to it or incorporate some walking  -     lancets Misc; Use for blood sugar testing once daily - product selection permitted refill sent   -     Basic metabolic panel; Future  -     Hemoglobin A1c; Future  -     Hepatic function panel; Future  -     Lipid panel; Future      Essential hypertension  Continued improvement although she has some fluctuations with her hormonal therapy  Plans discussed with her hematologist oncologist continue amlodipine daily and if blood pressure elevated consistently we discussed increasing her dose if needed    Hx breast cancer   Malignant neoplasm of lower-outer quadrant of left breast of female, estrogen receptor positive  She continues to follow with her hematologist oncologist has been trying to take hormonal therapy  But blood pressure fluctuations she is now taking letrozole twice a week  And blood pressure has improved she plans to discuss further with her hematologist oncologist today for her options moving forward    Hx b12 injections  She gets either the pharmacy or through the nurse monthly  rx sent as reqeusted   -     cyanocobalamin 1,000 mcg/mL injection; 1000 mcg IM monthly    Labs  tayla and her renal function is back to baseline     Follow-up 4 months with labs prior

## 2020-04-21 DIAGNOSIS — E11.9 TYPE 2 DIABETES MELLITUS WITHOUT COMPLICATION, WITHOUT LONG-TERM CURRENT USE OF INSULIN: ICD-10-CM

## 2020-04-21 RX ORDER — AMLODIPINE BESYLATE 5 MG/1
5 TABLET ORAL 2 TIMES DAILY
Qty: 60 TABLET | Refills: 6 | Status: SHIPPED | OUTPATIENT
Start: 2020-04-21 | End: 2020-12-08

## 2020-04-21 NOTE — TELEPHONE ENCOUNTER
Spoke with pt she saw her hem/onc and   Notified him of her concerns  She reports recommended tryihng to increased her dose  She will likely need better bp control if she does so  rx for amlodipine 5 mg twice a day   Sent to her pharmacy to start once she does so

## 2020-04-21 NOTE — TELEPHONE ENCOUNTER
Approved Medications      blood sugar diagnostic Strp         Sig: Use for blood sugar testing once daily - product selection permitted    Disp:  100 each    Refills:  3    Start: 4/21/2020    Class: Normal    Authorized by: Mariaelena Mallory MD    Non-formulary For: Type 2 diabetes mellitus without complication, without long-term current use of insulin        To be filled at: [id number] [address]

## 2020-04-28 ENCOUNTER — TELEPHONE (OUTPATIENT)
Dept: INTERNAL MEDICINE | Facility: CLINIC | Age: 75
End: 2020-04-28

## 2020-05-11 DIAGNOSIS — E11.9 TYPE 2 DIABETES MELLITUS WITHOUT COMPLICATION, WITHOUT LONG-TERM CURRENT USE OF INSULIN: ICD-10-CM

## 2020-05-15 ENCOUNTER — PATIENT MESSAGE (OUTPATIENT)
Dept: INTERNAL MEDICINE | Facility: CLINIC | Age: 75
End: 2020-05-15

## 2020-05-16 DIAGNOSIS — Z20.822 EXPOSURE TO COVID-19 VIRUS: Primary | ICD-10-CM

## 2020-05-16 NOTE — TELEPHONE ENCOUNTER
Antibody test order is in for scheduling   Can you call as requested to see what type of referral is needed   Or only an order and assist in scheduling next week with b12 shot as requested

## 2020-05-20 ENCOUNTER — LAB VISIT (OUTPATIENT)
Dept: LAB | Facility: HOSPITAL | Age: 75
End: 2020-05-20
Attending: INTERNAL MEDICINE
Payer: MEDICARE

## 2020-05-20 DIAGNOSIS — Z20.822 EXPOSURE TO COVID-19 VIRUS: ICD-10-CM

## 2020-05-20 LAB — SARS-COV-2 IGG SERPLBLD QL IA.RAPID: NEGATIVE

## 2020-05-20 PROCEDURE — 86769 SARS-COV-2 COVID-19 ANTIBODY: CPT

## 2020-05-20 PROCEDURE — 36415 COLL VENOUS BLD VENIPUNCTURE: CPT

## 2020-06-03 ENCOUNTER — TELEPHONE (OUTPATIENT)
Dept: ORTHOPEDICS | Facility: CLINIC | Age: 75
End: 2020-06-03

## 2020-06-05 ENCOUNTER — TELEPHONE (OUTPATIENT)
Dept: ORTHOPEDICS | Facility: CLINIC | Age: 75
End: 2020-06-05

## 2020-06-05 DIAGNOSIS — M79.673 PAIN OF FOOT, UNSPECIFIED LATERALITY: Primary | ICD-10-CM

## 2020-06-05 DIAGNOSIS — D36.10 NEUROMA: ICD-10-CM

## 2020-06-15 ENCOUNTER — OFFICE VISIT (OUTPATIENT)
Dept: ORTHOPEDICS | Facility: CLINIC | Age: 75
End: 2020-06-15
Payer: MEDICARE

## 2020-06-15 DIAGNOSIS — M79.673 PAIN OF FOOT, UNSPECIFIED LATERALITY: ICD-10-CM

## 2020-06-15 DIAGNOSIS — G57.61 MORTON'S NEUROMA OF RIGHT FOOT: Primary | ICD-10-CM

## 2020-06-15 PROCEDURE — 99212 OFFICE O/P EST SF 10 MIN: CPT | Mod: PBBFAC | Performed by: ORTHOPAEDIC SURGERY

## 2020-06-15 PROCEDURE — 99999 PR PBB SHADOW E&M-EST. PATIENT-LVL II: ICD-10-PCS | Mod: PBBFAC,,, | Performed by: ORTHOPAEDIC SURGERY

## 2020-06-15 PROCEDURE — 99213 OFFICE O/P EST LOW 20 MIN: CPT | Mod: S$PBB,,, | Performed by: ORTHOPAEDIC SURGERY

## 2020-06-15 PROCEDURE — 99213 PR OFFICE/OUTPT VISIT, EST, LEVL III, 20-29 MIN: ICD-10-PCS | Mod: S$PBB,,, | Performed by: ORTHOPAEDIC SURGERY

## 2020-06-15 PROCEDURE — 99999 PR PBB SHADOW E&M-EST. PATIENT-LVL II: CPT | Mod: PBBFAC,,, | Performed by: ORTHOPAEDIC SURGERY

## 2020-06-15 NOTE — PROGRESS NOTES
Loni Heard  Returns today for follow-up.  This is a 74-year-old female who I last saw about a year ago for a right ankle sprain.  She also has a history of a previous Vyas's neurectomy from her right foot 3rd web space many years ago elsewhere.  She had complained to me of recurrent pain in this area previously and she returns today because of continued pain in her right forefoot.  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  I have done previous intermetatarsal injection of the 3rd intermetatarsal space which has given her temporary relief.    Examination:  She walks in with a normal gait.  On inspection she has normal alignment of her feet with no obvious abnormalities.  On sitting exam she has a well-healed surgical incision on the dorsum of her foot in the 3rd intermetatarsal space from her previous surgery.  She is tender in the 3rd intermetatarsal space.  She has no pain on range of motion of her ankle and subtalar joint.  She has normal strength and sensation and good pulses.    Imaging:  Patient declined having new x-ray done today.    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  If she cannot manage her symptoms with activity modification and orthotics than she would be a surgical candidate.  She inquired about obtaining an ultrasound to pinpoint the neuroma, and I explained that I do not believe this would identify a neuroma unless it was grossly enlarged.  A previous MRI in April of 2019 revealed some nonspecific edema in the 3rd space but no obvious mass.  I recommended obtaining a new MRI of the right forefoot which she agreed to.    Return with results of MRI

## 2020-06-15 NOTE — LETTER
June 16, 2020      Mariaelena Mallory MD  [address]  [facility] [id number]           [facility]  [address] [place] [address]  Phone: [phone]  Fax: [phone]          Patient: Loni Heard   MR Number: [id number]   YOB: 1945   Date of Visit: 6/15/2020       Dear Dr. Mariaelena Mallory:    Thank you for referring Loni Heard to me for evaluation. Attached you will find relevant portions of my assessment and plan of care.    If you have questions, please do not hesitate to call me. I look forward to following Loni Heard along with you.    Sincerely,    Min Pond MD    Enclosure  CC:  No Recipients    If you would like to receive this communication electronically, please contact [email or web address] or [phone] to request more information on Transfer To Link access.    For providers and/or their staff who would like to refer a patient to [facility], please contact us through our one-stop-shop provider referral line, [facility], at [phone].    If you feel you have received this communication in error or would no longer like to receive these types of communications, please e-mail [email or web address]

## 2020-06-18 ENCOUNTER — HOSPITAL ENCOUNTER (OUTPATIENT)
Dept: RADIOLOGY | Facility: HOSPITAL | Age: 75
Discharge: HOME | End: 2020-06-18
Attending: ORTHOPAEDIC SURGERY
Payer: MEDICARE

## 2020-06-18 DIAGNOSIS — G57.61 MORTON'S NEUROMA OF RIGHT FOOT: ICD-10-CM

## 2020-06-18 DIAGNOSIS — M79.673 PAIN OF FOOT, UNSPECIFIED LATERALITY: ICD-10-CM

## 2020-06-18 PROCEDURE — 73718 MRI LOWER EXTREMITY W/O DYE: CPT | Mod: 26,RT,, | Performed by: RADIOLOGY

## 2020-06-18 PROCEDURE — 73718 MRI LOWER EXTREMITY W/O DYE: CPT | Mod: TC,RT

## 2020-06-18 PROCEDURE — 73718 MRI FOOT (FOREFOOT) RIGHT WITHOUT CONTRAST: ICD-10-PCS | Mod: 26,RT,, | Performed by: RADIOLOGY

## 2020-07-15 DIAGNOSIS — Z71.89 COMPLEX CARE COORDINATION: ICD-10-CM

## 2020-07-22 ENCOUNTER — PATIENT MESSAGE (OUTPATIENT)
Dept: INTERNAL MEDICINE | Facility: CLINIC | Age: 75
End: 2020-07-22

## 2020-07-22 DIAGNOSIS — E53.8 B12 DEFICIENCY: Primary | ICD-10-CM

## 2020-08-06 ENCOUNTER — PATIENT OUTREACH (OUTPATIENT)
Dept: ADMINISTRATIVE | Facility: HOSPITAL | Age: 75
End: 2020-08-06

## 2020-08-06 DIAGNOSIS — Z11.59 NEED FOR HEPATITIS C SCREENING TEST: Primary | ICD-10-CM

## 2020-08-06 DIAGNOSIS — Z12.11 COLON CANCER SCREENING: Primary | ICD-10-CM

## 2020-08-13 ENCOUNTER — LAB VISIT (OUTPATIENT)
Dept: LAB | Facility: HOSPITAL | Age: 75
End: 2020-08-13
Attending: INTERNAL MEDICINE
Payer: MEDICARE

## 2020-08-13 DIAGNOSIS — E53.8 B12 DEFICIENCY: ICD-10-CM

## 2020-08-13 DIAGNOSIS — E11.9 TYPE 2 DIABETES MELLITUS WITHOUT COMPLICATION, WITHOUT LONG-TERM CURRENT USE OF INSULIN: ICD-10-CM

## 2020-08-13 DIAGNOSIS — Z11.59 NEED FOR HEPATITIS C SCREENING TEST: ICD-10-CM

## 2020-08-13 LAB
ALBUMIN SERPL BCP-MCNC: 4.2 G/DL (ref 3.5–5.2)
ALP SERPL-CCNC: 78 U/L (ref 55–135)
ALT SERPL W/O P-5'-P-CCNC: 22 U/L (ref 10–44)
ANION GAP SERPL CALC-SCNC: 8 MMOL/L (ref 8–16)
AST SERPL-CCNC: 22 U/L (ref 10–40)
BILIRUB DIRECT SERPL-MCNC: 0.3 MG/DL (ref 0.1–0.3)
BILIRUB SERPL-MCNC: 0.6 MG/DL (ref 0.1–1)
BUN SERPL-MCNC: 15 MG/DL (ref 8–23)
CALCIUM SERPL-MCNC: 10.5 MG/DL (ref 8.7–10.5)
CHLORIDE SERPL-SCNC: 105 MMOL/L (ref 95–110)
CHOLEST SERPL-MCNC: 226 MG/DL (ref 120–199)
CHOLEST/HDLC SERPL: 4.1 {RATIO} (ref 2–5)
CO2 SERPL-SCNC: 29 MMOL/L (ref 23–29)
CREAT SERPL-MCNC: 0.9 MG/DL (ref 0.5–1.4)
EST. GFR  (AFRICAN AMERICAN): >60 ML/MIN/1.73 M^2
EST. GFR  (NON AFRICAN AMERICAN): >60 ML/MIN/1.73 M^2
ESTIMATED AVG GLUCOSE: 140 MG/DL (ref 68–131)
GLUCOSE SERPL-MCNC: 142 MG/DL (ref 70–110)
HBA1C MFR BLD HPLC: 6.5 % (ref 4–5.6)
HDLC SERPL-MCNC: 55 MG/DL (ref 40–75)
HDLC SERPL: 24.3 % (ref 20–50)
LDLC SERPL CALC-MCNC: 148.2 MG/DL (ref 63–159)
NONHDLC SERPL-MCNC: 171 MG/DL
POTASSIUM SERPL-SCNC: 4.6 MMOL/L (ref 3.5–5.1)
PROT SERPL-MCNC: 7.4 G/DL (ref 6–8.4)
SODIUM SERPL-SCNC: 142 MMOL/L (ref 136–145)
TRIGL SERPL-MCNC: 114 MG/DL (ref 30–150)
VIT B12 SERPL-MCNC: 805 PG/ML (ref 210–950)

## 2020-08-13 PROCEDURE — 80061 LIPID PANEL: CPT

## 2020-08-13 PROCEDURE — 82607 VITAMIN B-12: CPT

## 2020-08-13 PROCEDURE — 80076 HEPATIC FUNCTION PANEL: CPT

## 2020-08-13 PROCEDURE — 36415 COLL VENOUS BLD VENIPUNCTURE: CPT

## 2020-08-13 PROCEDURE — 80048 BASIC METABOLIC PNL TOTAL CA: CPT

## 2020-08-13 PROCEDURE — 83036 HEMOGLOBIN GLYCOSYLATED A1C: CPT

## 2020-08-13 PROCEDURE — 86803 HEPATITIS C AB TEST: CPT

## 2020-08-14 LAB — HCV AB SERPL QL IA: NEGATIVE

## 2020-08-25 DIAGNOSIS — E11.9 TYPE 2 DIABETES MELLITUS WITHOUT COMPLICATION, WITHOUT LONG-TERM CURRENT USE OF INSULIN: ICD-10-CM

## 2020-08-25 RX ORDER — LANCETS
EACH MISCELLANEOUS
Qty: 100 EACH | Refills: 3 | Status: SHIPPED | OUTPATIENT
Start: 2020-08-25 | End: 2021-12-19 | Stop reason: SDUPTHER

## 2020-09-08 DIAGNOSIS — E55.9 VITAMIN D DEFICIENCY: Primary | ICD-10-CM

## 2020-09-16 ENCOUNTER — OFFICE VISIT (OUTPATIENT)
Dept: INTERNAL MEDICINE | Facility: CLINIC | Age: 75
End: 2020-09-16
Payer: MEDICARE

## 2020-09-16 ENCOUNTER — LAB VISIT (OUTPATIENT)
Dept: LAB | Facility: HOSPITAL | Age: 75
End: 2020-09-16
Attending: INTERNAL MEDICINE
Payer: MEDICARE

## 2020-09-16 VITALS
OXYGEN SATURATION: 96 % | SYSTOLIC BLOOD PRESSURE: 120 MMHG | BODY MASS INDEX: 29.62 KG/M2 | HEIGHT: 64 IN | WEIGHT: 173.5 LBS | DIASTOLIC BLOOD PRESSURE: 72 MMHG | HEART RATE: 71 BPM

## 2020-09-16 DIAGNOSIS — E11.9 TYPE 2 DIABETES MELLITUS WITHOUT COMPLICATION, WITHOUT LONG-TERM CURRENT USE OF INSULIN: ICD-10-CM

## 2020-09-16 DIAGNOSIS — E78.49 OTHER HYPERLIPIDEMIA: ICD-10-CM

## 2020-09-16 DIAGNOSIS — Z12.11 COLON CANCER SCREENING: ICD-10-CM

## 2020-09-16 DIAGNOSIS — I10 ESSENTIAL HYPERTENSION: Primary | ICD-10-CM

## 2020-09-16 DIAGNOSIS — D36.10 NEUROMA: ICD-10-CM

## 2020-09-16 DIAGNOSIS — E53.8 B12 DEFICIENCY: ICD-10-CM

## 2020-09-16 DIAGNOSIS — E55.9 MILD VITAMIN D DEFICIENCY: ICD-10-CM

## 2020-09-16 DIAGNOSIS — C50.912 MALIGNANT NEOPLASM OF LEFT FEMALE BREAST, UNSPECIFIED ESTROGEN RECEPTOR STATUS, UNSPECIFIED SITE OF BREAST: ICD-10-CM

## 2020-09-16 PROCEDURE — 99214 OFFICE O/P EST MOD 30 MIN: CPT | Mod: PBBFAC | Performed by: INTERNAL MEDICINE

## 2020-09-16 PROCEDURE — 99214 OFFICE O/P EST MOD 30 MIN: CPT | Mod: S$PBB,,, | Performed by: INTERNAL MEDICINE

## 2020-09-16 PROCEDURE — 99999 PR PBB SHADOW E&M-EST. PATIENT-LVL IV: ICD-10-PCS | Mod: PBBFAC,,, | Performed by: INTERNAL MEDICINE

## 2020-09-16 PROCEDURE — 82274 ASSAY TEST FOR BLOOD FECAL: CPT

## 2020-09-16 PROCEDURE — 99214 PR OFFICE/OUTPT VISIT, EST, LEVL IV, 30-39 MIN: ICD-10-PCS | Mod: S$PBB,,, | Performed by: INTERNAL MEDICINE

## 2020-09-16 PROCEDURE — 99999 PR PBB SHADOW E&M-EST. PATIENT-LVL IV: CPT | Mod: PBBFAC,,, | Performed by: INTERNAL MEDICINE

## 2020-09-17 DIAGNOSIS — E55.9 VITAMIN D DEFICIENCY: Primary | ICD-10-CM

## 2020-09-17 DIAGNOSIS — E11.9 TYPE 2 DIABETES MELLITUS WITHOUT COMPLICATION, WITHOUT LONG-TERM CURRENT USE OF INSULIN: ICD-10-CM

## 2020-09-17 DIAGNOSIS — I10 ESSENTIAL HYPERTENSION: ICD-10-CM

## 2020-09-17 NOTE — PROGRESS NOTES
"Subjective:       Patient ID: Loni Heard is a 74 y.o. female.    Chief Complaint: Follow-up   this is a 74-year-old who presents today for follow-up.  Patient reports that she has been doing fairly well she is back to exercise and swimming outdoors most of the time she was having some issues with sinus and ear issues when she was swimming but that seems to have cleared up she does have some issues with neuroma on the right foot and she has seen orthopedist for an evaluation reports that she was concerned about the possibility of surgery so is seeking other options that may be less invasive with another doctor she was looking into it.  She continues to follow with her gynecologist and up-to-date on her mammogram and continues to follow with her hematologist oncologist she takes her letrozole 4 times a week but finds that her pressure fluctuates higher when she takes more than that she recently has been taking her amlodipine once a day and that seems to be working well.  She is up-to-date on her eye exam   Had mild retinopathy.  Her blood sugars have been fairly well controlled although sometimes a bit higher in the morning.  She also follows with her outlying gastroenterologist.  She is taking her B12 injections regularly was concerned that her her calcium was trending upward and would like to have that rechecked as well.     HPI  Review of Systems   Constitutional: Negative for fever.   HENT:        Has had some issues  With uri/ear with swimming  Symptoms resolved    Respiratory: Negative for cough.    Musculoskeletal:        Neuromoa right foot bothers her    Psychiatric/Behavioral:        Worries about her hx cancer  seeis her heme/onc        Objective:     Blood pressure 120/72, pulse 71, height 5' 4" (1.626 m), weight 78.7 kg (173 lb 8 oz), SpO2 96 %.    Physical Exam  Constitutional:       General: She is not in acute distress.  HENT:      Head: Normocephalic.      Comments: Tm clear    Neck:      " Musculoskeletal: Neck supple.   Cardiovascular:      Rate and Rhythm: Normal rate and regular rhythm.      Pulses:           Dorsalis pedis pulses are 1+ on the right side and 1+ on the left side.      Heart sounds: Normal heart sounds. No murmur. No friction rub. No gallop.       Comments: Surgical changes left breast   Radiation chagnes  Pulmonary:      Effort: Pulmonary effort is normal. No respiratory distress.      Breath sounds: Normal breath sounds.   Abdominal:      General: Bowel sounds are normal.      Tenderness: There is no abdominal tenderness.   Musculoskeletal:      Right foot: No deformity.      Left foot: No deformity.      Comments: Discomfort right foot/neuroma  At times some numbness toe from prior surgery      Feet:      Right foot:      Protective Sensation: 5 sites tested.      Left foot:      Protective Sensation: 5 sites tested.   Skin:     Findings: No erythema.   Neurological:      Mental Status: She is alert.         Assessment:       1. Essential hypertension    2. Type 2 diabetes mellitus without complication, without long-term current use of insulin    3. Colon cancer screening    4. B12 deficiency    5. Other hyperlipidemia    6. Mild vitamin D deficiency    7. Malignant neoplasm of left female breast, unspecified estrogen receptor status, unspecified site of breast    8. Neuroma        Plan:       Loni was seen today for follow-up.    Diagnoses and all orders for this visit:    Essential hypertension  Blood pressure she has had some fluctuations intermittently   She is currently taking amlodipine once daily but has twice daily dosing if needed for fluctuations    Type 2 diabetes mellitus without complication, without long-term current use of insulin  History of some increases in the morning at times she is working on getting back to exercise and watching her diet will continue her current regimen for now    Is she would like to have her immunoglobulins rechecked due to some  recent upper respiratory infections and previously question of some lower readings  -     Immunoglobulins (IgG, IgA, IgM) Quantitative; Future    Colon cancer screening  Discussed colonoscopy would like to do the fit kit  -     Fecal Immunochemical Test (iFOBT); Future    B12 deficiency  History of she remains on B12 injections and will continue to do her monthly injections  -     Immunoglobulins (IgG, IgA, IgM) Quantitative; Future    Other hyperlipidemia  History of she has been taking Zetia has declined statins but will continue her current regimen    Mild vitamin D deficiency  Calcium concerned discussed PTH and is calcium recheck  History of remains on vitamin-D    History of foot neuroma she has seen orthopedist and considering surgical options    Malignant neoplasm of left female breast, unspecified estrogen receptor status, unspecified site of breast  History of she continues to follow with her outlying hematologist oncologist is up-to-date on her mammogram and remains on letrozole as tolerated    Follow-up 4 months sooner if concerns

## 2020-09-21 ENCOUNTER — TELEPHONE (OUTPATIENT)
Dept: INTERNAL MEDICINE | Facility: CLINIC | Age: 75
End: 2020-09-21

## 2020-09-29 ENCOUNTER — PATIENT OUTREACH (OUTPATIENT)
Dept: ADMINISTRATIVE | Facility: HOSPITAL | Age: 75
End: 2020-09-29

## 2020-09-29 ENCOUNTER — PATIENT MESSAGE (OUTPATIENT)
Dept: OTHER | Facility: OTHER | Age: 75
End: 2020-09-29

## 2020-09-29 NOTE — PROGRESS NOTES
Patient came on the fobt workbook needing a collection date. Dispensed date fell within 14 day limit and was used for the collection date

## 2020-09-30 LAB — HEMOCCULT STL QL IA: NEGATIVE

## 2020-10-07 ENCOUNTER — TELEPHONE (OUTPATIENT)
Dept: INTERNAL MEDICINE | Facility: CLINIC | Age: 75
End: 2020-10-07

## 2020-10-07 NOTE — TELEPHONE ENCOUNTER
Pt sent catapult health form   Completed form   Please fax as requested and mail pt copy  And notify her when done

## 2020-11-09 ENCOUNTER — PATIENT MESSAGE (OUTPATIENT)
Dept: INTERNAL MEDICINE | Facility: CLINIC | Age: 75
End: 2020-11-09

## 2020-11-12 ENCOUNTER — PATIENT MESSAGE (OUTPATIENT)
Dept: INTERNAL MEDICINE | Facility: CLINIC | Age: 75
End: 2020-11-12

## 2020-12-04 ENCOUNTER — LAB VISIT (OUTPATIENT)
Dept: LAB | Facility: HOSPITAL | Age: 75
End: 2020-12-04
Attending: INTERNAL MEDICINE
Payer: MEDICARE

## 2020-12-04 DIAGNOSIS — E55.9 VITAMIN D DEFICIENCY: ICD-10-CM

## 2020-12-04 DIAGNOSIS — E11.9 TYPE 2 DIABETES MELLITUS WITHOUT COMPLICATION, WITHOUT LONG-TERM CURRENT USE OF INSULIN: ICD-10-CM

## 2020-12-04 DIAGNOSIS — I10 ESSENTIAL HYPERTENSION: ICD-10-CM

## 2020-12-04 LAB
25(OH)D3+25(OH)D2 SERPL-MCNC: 40 NG/ML (ref 30–96)
ALBUMIN SERPL BCP-MCNC: 4.1 G/DL (ref 3.5–5.2)
ALP SERPL-CCNC: 77 U/L (ref 55–135)
ALT SERPL W/O P-5'-P-CCNC: 19 U/L (ref 10–44)
ANION GAP SERPL CALC-SCNC: 11 MMOL/L (ref 8–16)
AST SERPL-CCNC: 20 U/L (ref 10–40)
BASOPHILS # BLD AUTO: 0.02 K/UL (ref 0–0.2)
BASOPHILS NFR BLD: 0.3 % (ref 0–1.9)
BILIRUB DIRECT SERPL-MCNC: 0.2 MG/DL (ref 0.1–0.3)
BILIRUB SERPL-MCNC: 0.5 MG/DL (ref 0.1–1)
BUN SERPL-MCNC: 17 MG/DL (ref 8–23)
CALCIUM SERPL-MCNC: 9.8 MG/DL (ref 8.7–10.5)
CHLORIDE SERPL-SCNC: 102 MMOL/L (ref 95–110)
CO2 SERPL-SCNC: 24 MMOL/L (ref 23–29)
CREAT SERPL-MCNC: 0.9 MG/DL (ref 0.5–1.4)
DIFFERENTIAL METHOD: ABNORMAL
EOSINOPHIL # BLD AUTO: 0.1 K/UL (ref 0–0.5)
EOSINOPHIL NFR BLD: 1.2 % (ref 0–8)
ERYTHROCYTE [DISTWIDTH] IN BLOOD BY AUTOMATED COUNT: 12.1 % (ref 11.5–14.5)
EST. GFR  (AFRICAN AMERICAN): >60 ML/MIN/1.73 M^2
EST. GFR  (NON AFRICAN AMERICAN): >60 ML/MIN/1.73 M^2
ESTIMATED AVG GLUCOSE: 131 MG/DL (ref 68–131)
GLUCOSE SERPL-MCNC: 118 MG/DL (ref 70–110)
HBA1C MFR BLD HPLC: 6.2 % (ref 4–5.6)
HCT VFR BLD AUTO: 41.9 % (ref 37–48.5)
HGB BLD-MCNC: 12.9 G/DL (ref 12–16)
IMM GRANULOCYTES # BLD AUTO: 0.01 K/UL (ref 0–0.04)
IMM GRANULOCYTES NFR BLD AUTO: 0.2 % (ref 0–0.5)
LYMPHOCYTES # BLD AUTO: 1.9 K/UL (ref 1–4.8)
LYMPHOCYTES NFR BLD: 33 % (ref 18–48)
MCH RBC QN AUTO: 28.9 PG (ref 27–31)
MCHC RBC AUTO-ENTMCNC: 30.8 G/DL (ref 32–36)
MCV RBC AUTO: 94 FL (ref 82–98)
MONOCYTES # BLD AUTO: 0.4 K/UL (ref 0.3–1)
MONOCYTES NFR BLD: 7.7 % (ref 4–15)
NEUTROPHILS # BLD AUTO: 3.3 K/UL (ref 1.8–7.7)
NEUTROPHILS NFR BLD: 57.6 % (ref 38–73)
NRBC BLD-RTO: 0 /100 WBC
PLATELET # BLD AUTO: 230 K/UL (ref 150–350)
PMV BLD AUTO: 11.3 FL (ref 9.2–12.9)
POTASSIUM SERPL-SCNC: 4.8 MMOL/L (ref 3.5–5.1)
PROT SERPL-MCNC: 6.9 G/DL (ref 6–8.4)
PTH-INTACT SERPL-MCNC: 55 PG/ML (ref 9–77)
RBC # BLD AUTO: 4.47 M/UL (ref 4–5.4)
SODIUM SERPL-SCNC: 137 MMOL/L (ref 136–145)
WBC # BLD AUTO: 5.73 K/UL (ref 3.9–12.7)

## 2020-12-04 PROCEDURE — 83970 ASSAY OF PARATHORMONE: CPT

## 2020-12-04 PROCEDURE — 85025 COMPLETE CBC W/AUTO DIFF WBC: CPT

## 2020-12-04 PROCEDURE — 36415 COLL VENOUS BLD VENIPUNCTURE: CPT

## 2020-12-04 PROCEDURE — 82306 VITAMIN D 25 HYDROXY: CPT

## 2020-12-04 PROCEDURE — 83036 HEMOGLOBIN GLYCOSYLATED A1C: CPT

## 2020-12-04 PROCEDURE — 80076 HEPATIC FUNCTION PANEL: CPT

## 2020-12-04 PROCEDURE — 80048 BASIC METABOLIC PNL TOTAL CA: CPT

## 2020-12-26 ENCOUNTER — PATIENT MESSAGE (OUTPATIENT)
Dept: INTERNAL MEDICINE | Facility: CLINIC | Age: 75
End: 2020-12-26

## 2020-12-26 ENCOUNTER — NURSE TRIAGE (OUTPATIENT)
Dept: ADMINISTRATIVE | Facility: CLINIC | Age: 75
End: 2020-12-26

## 2020-12-26 NOTE — TELEPHONE ENCOUNTER
Patient has swelling in the back of wisdom tooth (left side), pain/ache, possible dry socket, med questions.  Patient wants doctor Griffin's Mohamud nurse to call her back.    Reason for Disposition   Health Information question, no triage required and triager able to answer question    Additional Information   Negative: [1] Caller is not with the adult (patient) AND [2] reporting urgent symptoms   Negative: Lab result questions   Negative: Medication questions   Negative: Caller can't be reached by phone   Negative: Caller has already spoken to PCP or another triager   Negative: RN needs further essential information from caller in order to complete triage   Negative: Requesting regular office appointment   Negative: [1] Caller requesting NON-URGENT health information AND [2] PCP's office is the best resource    Protocols used: INFORMATION ONLY CALL - NO TRIAGE-A-

## 2020-12-26 NOTE — TELEPHONE ENCOUNTER
Patient has no fever. Patient has all her teeth. Dentist is closed. She had her b12 shot Wednesday. Patient took another call while on the phone with OOC. Had to disconnect. Patient called back. Swelling in back of wisdom tooth. Pain/ache . Glands are swollen underneath her neck. Talked to pharmacist . Pharmacist told her to put hydrogen peroxide on tooth. Worked for a couple of days. Dentist usually gives her a sulphur drug to treat dental emergencies. Pain is a six as far as tooth. Nurse told patient to try and use [facility] to conduct a virtual visit with a physician. Told patient if having trouble with [facility] she could always go to urgent care.    Reason for Disposition   General information question, no triage required and triager able to answer question    Additional Information   Negative: [1] Caller is not with the adult (patient) AND [2] reporting urgent symptoms   Negative: Lab result questions   Negative: Medication questions   Negative: Caller can't be reached by phone   Negative: Caller has already spoken to PCP or another triager   Negative: RN needs further essential information from caller in order to complete triage   Negative: Requesting regular office appointment   Negative: [1] Caller requesting NON-URGENT health information AND [2] PCP's office is the best resource    Protocols used: INFORMATION ONLY CALL - NO [facility]

## 2020-12-28 ENCOUNTER — PATIENT MESSAGE (OUTPATIENT)
Dept: INTERNAL MEDICINE | Facility: CLINIC | Age: 75
End: 2020-12-28

## 2020-12-28 DIAGNOSIS — E11.8 TYPE 2 DIABETES MELLITUS WITH UNSPECIFIED COMPLICATIONS: Primary | ICD-10-CM

## 2020-12-28 DIAGNOSIS — E11.9 TYPE 2 DIABETES MELLITUS WITHOUT COMPLICATION, WITHOUT LONG-TERM CURRENT USE OF INSULIN: ICD-10-CM

## 2020-12-28 NOTE — TELEPHONE ENCOUNTER
Notify pt I am out of the office  I did review her urgent appt  And see she got antibiototic o take until her dental appointment, a  urine microalbumin is due but no urgency, I  Did put an order in as requested to have her drop off when she chooses

## 2020-12-30 ENCOUNTER — PATIENT MESSAGE (OUTPATIENT)
Dept: INTERNAL MEDICINE | Facility: CLINIC | Age: 75
End: 2020-12-30

## 2020-12-31 ENCOUNTER — PATIENT MESSAGE (OUTPATIENT)
Dept: INTERNAL MEDICINE | Facility: CLINIC | Age: 75
End: 2020-12-31

## 2021-01-03 ENCOUNTER — PATIENT MESSAGE (OUTPATIENT)
Dept: INTERNAL MEDICINE | Facility: CLINIC | Age: 76
End: 2021-01-03

## 2021-01-09 ENCOUNTER — IMMUNIZATION (OUTPATIENT)
Dept: INTERNAL MEDICINE | Facility: CLINIC | Age: 76
End: 2021-01-09
Payer: MEDICARE

## 2021-01-09 DIAGNOSIS — Z23 NEED FOR VACCINATION: ICD-10-CM

## 2021-01-09 PROCEDURE — 91300 COVID-19, MRNA, LNP-S, PF, 30 MCG/0.3 ML DOSE VACCINE: CPT | Mod: PBBFAC

## 2021-01-14 ENCOUNTER — TELEPHONE (OUTPATIENT)
Dept: INTERNAL MEDICINE | Facility: CLINIC | Age: 76
End: 2021-01-14

## 2021-01-14 ENCOUNTER — PATIENT MESSAGE (OUTPATIENT)
Dept: ORTHOPEDICS | Facility: CLINIC | Age: 76
End: 2021-01-14

## 2021-01-15 ENCOUNTER — PATIENT MESSAGE (OUTPATIENT)
Dept: INTERNAL MEDICINE | Facility: CLINIC | Age: 76
End: 2021-01-15

## 2021-01-19 ENCOUNTER — OFFICE VISIT (OUTPATIENT)
Dept: INTERNAL MEDICINE | Facility: CLINIC | Age: 76
End: 2021-01-19
Payer: MEDICARE

## 2021-01-19 VITALS
HEART RATE: 69 BPM | HEIGHT: 64 IN | BODY MASS INDEX: 28.95 KG/M2 | SYSTOLIC BLOOD PRESSURE: 130 MMHG | DIASTOLIC BLOOD PRESSURE: 80 MMHG | OXYGEN SATURATION: 99 % | WEIGHT: 169.56 LBS

## 2021-01-19 DIAGNOSIS — E11.8 TYPE 2 DIABETES MELLITUS WITH UNSPECIFIED COMPLICATIONS: Primary | ICD-10-CM

## 2021-01-19 DIAGNOSIS — E78.49 OTHER HYPERLIPIDEMIA: ICD-10-CM

## 2021-01-19 DIAGNOSIS — Z85.3 HX: BREAST CANCER: ICD-10-CM

## 2021-01-19 DIAGNOSIS — E53.8 B12 DEFICIENCY: ICD-10-CM

## 2021-01-19 DIAGNOSIS — I10 ESSENTIAL HYPERTENSION: ICD-10-CM

## 2021-01-19 PROCEDURE — 99999 PR PBB SHADOW E&M-EST. PATIENT-LVL IV: CPT | Mod: PBBFAC,,, | Performed by: INTERNAL MEDICINE

## 2021-01-19 PROCEDURE — 99214 OFFICE O/P EST MOD 30 MIN: CPT | Mod: PBBFAC | Performed by: INTERNAL MEDICINE

## 2021-01-19 PROCEDURE — 99214 OFFICE O/P EST MOD 30 MIN: CPT | Mod: S$PBB,,, | Performed by: INTERNAL MEDICINE

## 2021-01-19 PROCEDURE — 99999 PR PBB SHADOW E&M-EST. PATIENT-LVL IV: ICD-10-PCS | Mod: PBBFAC,,, | Performed by: INTERNAL MEDICINE

## 2021-01-19 PROCEDURE — 99214 PR OFFICE/OUTPT VISIT, EST, LEVL IV, 30-39 MIN: ICD-10-PCS | Mod: S$PBB,,, | Performed by: INTERNAL MEDICINE

## 2021-01-19 RX ORDER — LETROZOLE 2.5 MG/1
2.5 TABLET, FILM COATED ORAL
COMMUNITY
Start: 2020-07-24 | End: 2024-01-29 | Stop reason: ALTCHOICE

## 2021-01-20 ENCOUNTER — PATIENT MESSAGE (OUTPATIENT)
Dept: INTERNAL MEDICINE | Facility: CLINIC | Age: 76
End: 2021-01-20

## 2021-01-30 ENCOUNTER — IMMUNIZATION (OUTPATIENT)
Dept: INTERNAL MEDICINE | Facility: CLINIC | Age: 76
End: 2021-01-30
Payer: MEDICARE

## 2021-01-30 DIAGNOSIS — Z23 NEED FOR VACCINATION: Primary | ICD-10-CM

## 2021-01-30 PROCEDURE — 91300 PR SARS-COV- 2 COVID-19 VACCINE, NO PRSV, 30MCG/0.3ML, IM: CPT | Mod: PBBFAC

## 2021-01-30 PROCEDURE — 0002A PR IMMUNIZ ADMIN, SARS-COV-2 COVID-19 VACC, 30MCG/0.3ML, 2ND DOSE: CPT | Mod: PBBFAC

## 2021-01-30 RX ADMIN — RNA INGREDIENT BNT-162B2 0.3 ML: 0.23 INJECTION, SUSPENSION INTRAMUSCULAR at 08:01

## 2021-03-18 ENCOUNTER — PATIENT MESSAGE (OUTPATIENT)
Dept: RESEARCH | Facility: HOSPITAL | Age: 76
End: 2021-03-18

## 2021-03-26 ENCOUNTER — PATIENT MESSAGE (OUTPATIENT)
Dept: RESEARCH | Facility: HOSPITAL | Age: 76
End: 2021-03-26

## 2021-04-22 RX ORDER — CYANOCOBALAMIN 1000 UG/ML
INJECTION, SOLUTION INTRAMUSCULAR; SUBCUTANEOUS
Qty: 10 ML | Refills: 6 | Status: SHIPPED | OUTPATIENT
Start: 2021-04-22 | End: 2021-09-20 | Stop reason: SDUPTHER

## 2021-05-13 ENCOUNTER — PATIENT MESSAGE (OUTPATIENT)
Dept: INTERNAL MEDICINE | Facility: CLINIC | Age: 76
End: 2021-05-13

## 2021-05-13 ENCOUNTER — LAB VISIT (OUTPATIENT)
Dept: LAB | Facility: HOSPITAL | Age: 76
End: 2021-05-13
Attending: INTERNAL MEDICINE
Payer: MEDICARE

## 2021-05-13 DIAGNOSIS — E11.8 TYPE 2 DIABETES MELLITUS WITH UNSPECIFIED COMPLICATIONS: ICD-10-CM

## 2021-05-13 LAB
ALBUMIN SERPL BCP-MCNC: 3.9 G/DL (ref 3.5–5.2)
ALP SERPL-CCNC: 77 U/L (ref 55–135)
ALT SERPL W/O P-5'-P-CCNC: 18 U/L (ref 10–44)
ANION GAP SERPL CALC-SCNC: 8 MMOL/L (ref 8–16)
AST SERPL-CCNC: 20 U/L (ref 10–40)
BASOPHILS # BLD AUTO: 0.03 K/UL (ref 0–0.2)
BASOPHILS NFR BLD: 0.5 % (ref 0–1.9)
BILIRUB DIRECT SERPL-MCNC: 0.2 MG/DL (ref 0.1–0.3)
BILIRUB SERPL-MCNC: 0.5 MG/DL (ref 0.1–1)
BUN SERPL-MCNC: 14 MG/DL (ref 8–23)
CALCIUM SERPL-MCNC: 10 MG/DL (ref 8.7–10.5)
CHLORIDE SERPL-SCNC: 105 MMOL/L (ref 95–110)
CHOLEST SERPL-MCNC: 229 MG/DL (ref 120–199)
CHOLEST/HDLC SERPL: 4.3 {RATIO} (ref 2–5)
CO2 SERPL-SCNC: 29 MMOL/L (ref 23–29)
CREAT SERPL-MCNC: 0.8 MG/DL (ref 0.5–1.4)
DIFFERENTIAL METHOD: ABNORMAL
EOSINOPHIL # BLD AUTO: 0.1 K/UL (ref 0–0.5)
EOSINOPHIL NFR BLD: 1.2 % (ref 0–8)
ERYTHROCYTE [DISTWIDTH] IN BLOOD BY AUTOMATED COUNT: 12.8 % (ref 11.5–14.5)
EST. GFR  (AFRICAN AMERICAN): >60 ML/MIN/1.73 M^2
EST. GFR  (NON AFRICAN AMERICAN): >60 ML/MIN/1.73 M^2
ESTIMATED AVG GLUCOSE: 143 MG/DL (ref 68–131)
GLUCOSE SERPL-MCNC: 138 MG/DL (ref 70–110)
HBA1C MFR BLD: 6.6 % (ref 4–5.6)
HCT VFR BLD AUTO: 41.3 % (ref 37–48.5)
HDLC SERPL-MCNC: 53 MG/DL (ref 40–75)
HDLC SERPL: 23.1 % (ref 20–50)
HGB BLD-MCNC: 13.1 G/DL (ref 12–16)
IMM GRANULOCYTES # BLD AUTO: 0.02 K/UL (ref 0–0.04)
IMM GRANULOCYTES NFR BLD AUTO: 0.3 % (ref 0–0.5)
LDLC SERPL CALC-MCNC: 148 MG/DL (ref 63–159)
LYMPHOCYTES # BLD AUTO: 2 K/UL (ref 1–4.8)
LYMPHOCYTES NFR BLD: 33.2 % (ref 18–48)
MCH RBC QN AUTO: 29 PG (ref 27–31)
MCHC RBC AUTO-ENTMCNC: 31.7 G/DL (ref 32–36)
MCV RBC AUTO: 91 FL (ref 82–98)
MONOCYTES # BLD AUTO: 0.5 K/UL (ref 0.3–1)
MONOCYTES NFR BLD: 8.6 % (ref 4–15)
NEUTROPHILS # BLD AUTO: 3.4 K/UL (ref 1.8–7.7)
NEUTROPHILS NFR BLD: 56.2 % (ref 38–73)
NONHDLC SERPL-MCNC: 176 MG/DL
NRBC BLD-RTO: 0 /100 WBC
PLATELET # BLD AUTO: 227 K/UL (ref 150–450)
PMV BLD AUTO: 11.1 FL (ref 9.2–12.9)
POTASSIUM SERPL-SCNC: 4.7 MMOL/L (ref 3.5–5.1)
PROT SERPL-MCNC: 6.9 G/DL (ref 6–8.4)
RBC # BLD AUTO: 4.52 M/UL (ref 4–5.4)
SODIUM SERPL-SCNC: 142 MMOL/L (ref 136–145)
TRIGL SERPL-MCNC: 140 MG/DL (ref 30–150)
WBC # BLD AUTO: 6.03 K/UL (ref 3.9–12.7)

## 2021-05-13 PROCEDURE — 80048 BASIC METABOLIC PNL TOTAL CA: CPT | Performed by: INTERNAL MEDICINE

## 2021-05-13 PROCEDURE — 83036 HEMOGLOBIN GLYCOSYLATED A1C: CPT | Performed by: INTERNAL MEDICINE

## 2021-05-13 PROCEDURE — 36415 COLL VENOUS BLD VENIPUNCTURE: CPT | Performed by: INTERNAL MEDICINE

## 2021-05-13 PROCEDURE — 85025 COMPLETE CBC W/AUTO DIFF WBC: CPT | Performed by: INTERNAL MEDICINE

## 2021-05-13 PROCEDURE — 80076 HEPATIC FUNCTION PANEL: CPT | Performed by: INTERNAL MEDICINE

## 2021-05-13 PROCEDURE — 80061 LIPID PANEL: CPT | Performed by: INTERNAL MEDICINE

## 2021-05-19 ENCOUNTER — OFFICE VISIT (OUTPATIENT)
Dept: INTERNAL MEDICINE | Facility: CLINIC | Age: 76
End: 2021-05-19
Payer: MEDICARE

## 2021-05-19 VITALS
WEIGHT: 170.88 LBS | HEIGHT: 64 IN | HEART RATE: 60 BPM | BODY MASS INDEX: 29.17 KG/M2 | OXYGEN SATURATION: 100 % | DIASTOLIC BLOOD PRESSURE: 70 MMHG | SYSTOLIC BLOOD PRESSURE: 128 MMHG

## 2021-05-19 DIAGNOSIS — E53.8 B12 DEFICIENCY: ICD-10-CM

## 2021-05-19 DIAGNOSIS — Z85.3 HX: BREAST CANCER: ICD-10-CM

## 2021-05-19 DIAGNOSIS — E55.9 MILD VITAMIN D DEFICIENCY: ICD-10-CM

## 2021-05-19 DIAGNOSIS — I10 ESSENTIAL HYPERTENSION: ICD-10-CM

## 2021-05-19 DIAGNOSIS — E11.9 TYPE 2 DIABETES MELLITUS WITHOUT COMPLICATION, WITHOUT LONG-TERM CURRENT USE OF INSULIN: Primary | ICD-10-CM

## 2021-05-19 PROCEDURE — 99214 PR OFFICE/OUTPT VISIT, EST, LEVL IV, 30-39 MIN: ICD-10-PCS | Mod: S$PBB,,, | Performed by: INTERNAL MEDICINE

## 2021-05-19 PROCEDURE — 99999 PR PBB SHADOW E&M-EST. PATIENT-LVL IV: ICD-10-PCS | Mod: PBBFAC,,, | Performed by: INTERNAL MEDICINE

## 2021-05-19 PROCEDURE — 99214 OFFICE O/P EST MOD 30 MIN: CPT | Mod: S$PBB,,, | Performed by: INTERNAL MEDICINE

## 2021-05-19 PROCEDURE — 99999 PR PBB SHADOW E&M-EST. PATIENT-LVL IV: CPT | Mod: PBBFAC,,, | Performed by: INTERNAL MEDICINE

## 2021-05-19 PROCEDURE — 99214 OFFICE O/P EST MOD 30 MIN: CPT | Mod: PBBFAC | Performed by: INTERNAL MEDICINE

## 2021-05-19 RX ORDER — PLANT STANOL ESTER 450 MG
8000 TABLET ORAL DAILY
COMMUNITY
End: 2021-11-30

## 2021-07-21 ENCOUNTER — DOCUMENTATION ONLY (OUTPATIENT)
Dept: PHARMACY | Facility: CLINIC | Age: 76
End: 2021-07-21

## 2021-08-11 ENCOUNTER — PATIENT MESSAGE (OUTPATIENT)
Dept: INTERNAL MEDICINE | Facility: CLINIC | Age: 76
End: 2021-08-11

## 2021-09-16 ENCOUNTER — LAB VISIT (OUTPATIENT)
Dept: LAB | Facility: HOSPITAL | Age: 76
End: 2021-09-16
Attending: INTERNAL MEDICINE
Payer: MEDICARE

## 2021-09-16 DIAGNOSIS — E11.9 TYPE 2 DIABETES MELLITUS WITHOUT COMPLICATION, WITHOUT LONG-TERM CURRENT USE OF INSULIN: ICD-10-CM

## 2021-09-16 DIAGNOSIS — E53.8 B12 DEFICIENCY: ICD-10-CM

## 2021-09-16 DIAGNOSIS — E55.9 MILD VITAMIN D DEFICIENCY: ICD-10-CM

## 2021-09-16 LAB
25(OH)D3+25(OH)D2 SERPL-MCNC: 43 NG/ML (ref 30–96)
ALBUMIN SERPL BCP-MCNC: 4.1 G/DL (ref 3.5–5.2)
ALP SERPL-CCNC: 75 U/L (ref 55–135)
ALT SERPL W/O P-5'-P-CCNC: 19 U/L (ref 10–44)
ANION GAP SERPL CALC-SCNC: 8 MMOL/L (ref 8–16)
AST SERPL-CCNC: 22 U/L (ref 10–40)
BASOPHILS # BLD AUTO: 0.02 K/UL (ref 0–0.2)
BASOPHILS NFR BLD: 0.3 % (ref 0–1.9)
BILIRUB DIRECT SERPL-MCNC: 0.2 MG/DL (ref 0.1–0.3)
BILIRUB SERPL-MCNC: 0.7 MG/DL (ref 0.1–1)
BUN SERPL-MCNC: 13 MG/DL (ref 8–23)
CALCIUM SERPL-MCNC: 10 MG/DL (ref 8.7–10.5)
CHLORIDE SERPL-SCNC: 104 MMOL/L (ref 95–110)
CO2 SERPL-SCNC: 28 MMOL/L (ref 23–29)
CREAT SERPL-MCNC: 0.8 MG/DL (ref 0.5–1.4)
DIFFERENTIAL METHOD: NORMAL
EOSINOPHIL # BLD AUTO: 0.1 K/UL (ref 0–0.5)
EOSINOPHIL NFR BLD: 1.2 % (ref 0–8)
ERYTHROCYTE [DISTWIDTH] IN BLOOD BY AUTOMATED COUNT: 12.4 % (ref 11.5–14.5)
EST. GFR  (AFRICAN AMERICAN): >60 ML/MIN/1.73 M^2
EST. GFR  (NON AFRICAN AMERICAN): >60 ML/MIN/1.73 M^2
ESTIMATED AVG GLUCOSE: 140 MG/DL (ref 68–131)
GLUCOSE SERPL-MCNC: 121 MG/DL (ref 70–110)
HBA1C MFR BLD: 6.5 % (ref 4–5.6)
HCT VFR BLD AUTO: 41.6 % (ref 37–48.5)
HGB BLD-MCNC: 13.5 G/DL (ref 12–16)
IMM GRANULOCYTES # BLD AUTO: 0.01 K/UL (ref 0–0.04)
IMM GRANULOCYTES NFR BLD AUTO: 0.2 % (ref 0–0.5)
LYMPHOCYTES # BLD AUTO: 2.2 K/UL (ref 1–4.8)
LYMPHOCYTES NFR BLD: 36.7 % (ref 18–48)
MCH RBC QN AUTO: 29.3 PG (ref 27–31)
MCHC RBC AUTO-ENTMCNC: 32.5 G/DL (ref 32–36)
MCV RBC AUTO: 90 FL (ref 82–98)
MONOCYTES # BLD AUTO: 0.6 K/UL (ref 0.3–1)
MONOCYTES NFR BLD: 9.7 % (ref 4–15)
NEUTROPHILS # BLD AUTO: 3.1 K/UL (ref 1.8–7.7)
NEUTROPHILS NFR BLD: 51.9 % (ref 38–73)
NRBC BLD-RTO: 0 /100 WBC
PLATELET # BLD AUTO: 223 K/UL (ref 150–450)
PMV BLD AUTO: 11.2 FL (ref 9.2–12.9)
POTASSIUM SERPL-SCNC: 5.1 MMOL/L (ref 3.5–5.1)
PROT SERPL-MCNC: 7.2 G/DL (ref 6–8.4)
RBC # BLD AUTO: 4.6 M/UL (ref 4–5.4)
SODIUM SERPL-SCNC: 140 MMOL/L (ref 136–145)
VIT B12 SERPL-MCNC: 464 PG/ML (ref 210–950)
WBC # BLD AUTO: 5.89 K/UL (ref 3.9–12.7)

## 2021-09-16 PROCEDURE — 85025 COMPLETE CBC W/AUTO DIFF WBC: CPT | Performed by: INTERNAL MEDICINE

## 2021-09-16 PROCEDURE — 80076 HEPATIC FUNCTION PANEL: CPT | Performed by: INTERNAL MEDICINE

## 2021-09-16 PROCEDURE — 82607 VITAMIN B-12: CPT | Performed by: INTERNAL MEDICINE

## 2021-09-16 PROCEDURE — 82306 VITAMIN D 25 HYDROXY: CPT | Performed by: INTERNAL MEDICINE

## 2021-09-16 PROCEDURE — 83036 HEMOGLOBIN GLYCOSYLATED A1C: CPT | Performed by: INTERNAL MEDICINE

## 2021-09-16 PROCEDURE — 36415 COLL VENOUS BLD VENIPUNCTURE: CPT | Performed by: INTERNAL MEDICINE

## 2021-09-16 PROCEDURE — 80048 BASIC METABOLIC PNL TOTAL CA: CPT | Performed by: INTERNAL MEDICINE

## 2021-09-20 ENCOUNTER — OFFICE VISIT (OUTPATIENT)
Dept: INTERNAL MEDICINE | Facility: CLINIC | Age: 76
End: 2021-09-20
Payer: MEDICARE

## 2021-09-20 VITALS
HEIGHT: 64 IN | BODY MASS INDEX: 29.24 KG/M2 | DIASTOLIC BLOOD PRESSURE: 70 MMHG | SYSTOLIC BLOOD PRESSURE: 130 MMHG | WEIGHT: 171.31 LBS | OXYGEN SATURATION: 97 % | HEART RATE: 67 BPM

## 2021-09-20 DIAGNOSIS — E11.8 TYPE 2 DIABETES MELLITUS WITH UNSPECIFIED COMPLICATIONS: Primary | ICD-10-CM

## 2021-09-20 DIAGNOSIS — E78.5 HYPERLIPIDEMIA, UNSPECIFIED HYPERLIPIDEMIA TYPE: ICD-10-CM

## 2021-09-20 DIAGNOSIS — Z78.0 POSTMENOPAUSAL: ICD-10-CM

## 2021-09-20 DIAGNOSIS — Z85.3 HX: BREAST CANCER: ICD-10-CM

## 2021-09-20 DIAGNOSIS — E53.8 B12 DEFICIENCY: ICD-10-CM

## 2021-09-20 DIAGNOSIS — K29.40 ATROPHIC GASTRITIS WITHOUT HEMORRHAGE: ICD-10-CM

## 2021-09-20 PROCEDURE — 99999 PR PBB SHADOW E&M-EST. PATIENT-LVL IV: ICD-10-PCS | Mod: PBBFAC,,, | Performed by: INTERNAL MEDICINE

## 2021-09-20 PROCEDURE — 99214 PR OFFICE/OUTPT VISIT, EST, LEVL IV, 30-39 MIN: ICD-10-PCS | Mod: S$PBB,,, | Performed by: INTERNAL MEDICINE

## 2021-09-20 PROCEDURE — 99214 OFFICE O/P EST MOD 30 MIN: CPT | Mod: S$PBB,,, | Performed by: INTERNAL MEDICINE

## 2021-09-20 PROCEDURE — 99214 OFFICE O/P EST MOD 30 MIN: CPT | Mod: PBBFAC | Performed by: INTERNAL MEDICINE

## 2021-09-20 PROCEDURE — 99999 PR PBB SHADOW E&M-EST. PATIENT-LVL IV: CPT | Mod: PBBFAC,,, | Performed by: INTERNAL MEDICINE

## 2021-09-20 RX ORDER — CYANOCOBALAMIN 1000 UG/ML
INJECTION, SOLUTION INTRAMUSCULAR; SUBCUTANEOUS
Qty: 9 ML | Refills: 6 | Status: SHIPPED | OUTPATIENT
Start: 2021-09-20 | End: 2022-05-27 | Stop reason: SDUPTHER

## 2021-09-20 RX ORDER — CYANOCOBALAMIN 1000 UG/ML
1000 INJECTION, SOLUTION INTRAMUSCULAR; SUBCUTANEOUS
Status: SHIPPED | OUTPATIENT
Start: 2021-09-21

## 2021-09-24 ENCOUNTER — PES CALL (OUTPATIENT)
Dept: ADMINISTRATIVE | Facility: CLINIC | Age: 76
End: 2021-09-24

## 2021-10-01 ENCOUNTER — HOSPITAL ENCOUNTER (OUTPATIENT)
Dept: RADIOLOGY | Facility: CLINIC | Age: 76
Discharge: HOME | End: 2021-10-01
Attending: INTERNAL MEDICINE
Payer: MEDICARE

## 2021-10-01 DIAGNOSIS — Z78.0 POSTMENOPAUSAL: ICD-10-CM

## 2021-10-01 PROCEDURE — 77080 DXA BONE DENSITY AXIAL: CPT | Mod: TC

## 2021-10-01 PROCEDURE — 77080 DEXA BONE DENSITY SPINE HIP: ICD-10-PCS | Mod: 26,,, | Performed by: INTERNAL MEDICINE

## 2021-10-01 PROCEDURE — 77080 DXA BONE DENSITY AXIAL: CPT | Mod: 26,,, | Performed by: INTERNAL MEDICINE

## 2021-10-20 ENCOUNTER — PATIENT MESSAGE (OUTPATIENT)
Dept: INTERNAL MEDICINE | Facility: CLINIC | Age: 76
End: 2021-10-20
Payer: MEDICARE

## 2021-10-20 PROBLEM — M85.80 OSTEOPENIA: Status: RESOLVED | Noted: 2019-08-13 | Resolved: 2021-10-20

## 2021-10-26 ENCOUNTER — PATIENT MESSAGE (OUTPATIENT)
Dept: INTERNAL MEDICINE | Facility: CLINIC | Age: 76
End: 2021-10-26

## 2021-10-26 ENCOUNTER — IMMUNIZATION (OUTPATIENT)
Dept: INTERNAL MEDICINE | Facility: CLINIC | Age: 76
End: 2021-10-26
Payer: MEDICARE

## 2021-10-26 PROCEDURE — 90694 VACC AIIV4 NO PRSRV 0.5ML IM: CPT | Mod: PBBFAC

## 2021-10-26 PROCEDURE — G0008 ADMIN INFLUENZA VIRUS VAC: HCPCS | Mod: PBBFAC

## 2021-10-27 DIAGNOSIS — E11.9 TYPE 2 DIABETES MELLITUS WITHOUT COMPLICATION, WITHOUT LONG-TERM CURRENT USE OF INSULIN: Primary | ICD-10-CM

## 2021-10-27 DIAGNOSIS — I10 ESSENTIAL HYPERTENSION: ICD-10-CM

## 2021-11-03 ENCOUNTER — TELEPHONE (OUTPATIENT)
Dept: OPHTHALMOLOGY | Facility: CLINIC | Age: 76
End: 2021-11-03
Payer: MEDICARE

## 2021-11-03 ENCOUNTER — PATIENT MESSAGE (OUTPATIENT)
Dept: INTERNAL MEDICINE | Facility: CLINIC | Age: 76
End: 2021-11-03
Payer: MEDICARE

## 2021-11-03 DIAGNOSIS — I10 ESSENTIAL HYPERTENSION: Primary | ICD-10-CM

## 2021-11-03 DIAGNOSIS — E11.8 TYPE 2 DIABETES MELLITUS WITH UNSPECIFIED COMPLICATIONS: ICD-10-CM

## 2021-11-04 ENCOUNTER — IMMUNIZATION (OUTPATIENT)
Dept: INTERNAL MEDICINE | Facility: CLINIC | Age: 76
End: 2021-11-04
Payer: MEDICARE

## 2021-11-04 DIAGNOSIS — Z23 NEED FOR VACCINATION: Primary | ICD-10-CM

## 2021-11-04 PROCEDURE — 0004A COVID-19, MRNA, LNP-S, PF, 30 MCG/0.3 ML DOSE VACCINE: CPT | Mod: PBBFAC,CV19

## 2021-11-30 ENCOUNTER — PATIENT MESSAGE (OUTPATIENT)
Dept: INTERNAL MEDICINE | Facility: CLINIC | Age: 76
End: 2021-11-30
Payer: MEDICARE

## 2021-11-30 ENCOUNTER — OFFICE VISIT (OUTPATIENT)
Dept: OPHTHALMOLOGY | Facility: CLINIC | Age: 76
End: 2021-11-30
Payer: MEDICARE

## 2021-11-30 DIAGNOSIS — H25.13 NS (NUCLEAR SCLEROSIS), BILATERAL: ICD-10-CM

## 2021-11-30 PROCEDURE — 99214 OFFICE O/P EST MOD 30 MIN: CPT | Mod: PBBFAC | Performed by: OPHTHALMOLOGY

## 2021-11-30 PROCEDURE — 92201 PR OPHTHALMOSCOPY, EXT, W/RET DRAW/SCLERAL DEPR, I&R, UNI/BI: ICD-10-PCS | Mod: S$PBB,,, | Performed by: OPHTHALMOLOGY

## 2021-11-30 PROCEDURE — 99999 PR PBB SHADOW E&M-EST. PATIENT-LVL IV: ICD-10-PCS | Mod: PBBFAC,,, | Performed by: OPHTHALMOLOGY

## 2021-11-30 PROCEDURE — 99999 PR PBB SHADOW E&M-EST. PATIENT-LVL IV: CPT | Mod: PBBFAC,,, | Performed by: OPHTHALMOLOGY

## 2021-11-30 PROCEDURE — 92201 OPSCPY EXTND RTA DRAW UNI/BI: CPT | Mod: S$PBB,,, | Performed by: OPHTHALMOLOGY

## 2021-11-30 PROCEDURE — 92004 COMPRE OPH EXAM NEW PT 1/>: CPT | Mod: S$PBB,,, | Performed by: OPHTHALMOLOGY

## 2021-11-30 PROCEDURE — 92004 PR EYE EXAM, NEW PATIENT,COMPREHESV: ICD-10-PCS | Mod: S$PBB,,, | Performed by: OPHTHALMOLOGY

## 2021-11-30 PROCEDURE — 92201 OPSCPY EXTND RTA DRAW UNI/BI: CPT | Mod: PBBFAC | Performed by: OPHTHALMOLOGY

## 2021-11-30 RX ORDER — ESTRADIOL 0.1 MG/G
CREAM VAGINAL
COMMUNITY
Start: 2021-08-23 | End: 2024-01-29 | Stop reason: ALTCHOICE

## 2021-11-30 RX ORDER — MAGNESIUM 250 MG
TABLET ORAL
COMMUNITY

## 2021-11-30 RX ORDER — CEPHRADINE 500 MG
CAPSULE ORAL
COMMUNITY
Start: 2019-02-01 | End: 2023-01-20 | Stop reason: ALTCHOICE

## 2021-11-30 RX ORDER — CALCIUM CARBONATE/VITAMIN D3 600MG-5MCG
TABLET ORAL
COMMUNITY
End: 2022-01-20 | Stop reason: SDUPTHER

## 2021-11-30 RX ORDER — AA/PROT/LYSINE/METHIO/VIT C/B6 50-12.5 MG
10 TABLET ORAL
COMMUNITY

## 2021-12-18 ENCOUNTER — PATIENT MESSAGE (OUTPATIENT)
Dept: INTERNAL MEDICINE | Facility: CLINIC | Age: 76
End: 2021-12-18
Payer: MEDICARE

## 2021-12-18 DIAGNOSIS — E11.9 TYPE 2 DIABETES MELLITUS WITHOUT COMPLICATION, WITHOUT LONG-TERM CURRENT USE OF INSULIN: ICD-10-CM

## 2021-12-19 RX ORDER — LANCETS
EACH MISCELLANEOUS
Qty: 50 EACH | Refills: 3 | Status: SHIPPED | OUTPATIENT
Start: 2021-12-19

## 2021-12-21 ENCOUNTER — PATIENT MESSAGE (OUTPATIENT)
Dept: INTERNAL MEDICINE | Facility: CLINIC | Age: 76
End: 2021-12-21
Payer: MEDICARE

## 2022-01-07 ENCOUNTER — PATIENT MESSAGE (OUTPATIENT)
Dept: INTERNAL MEDICINE | Facility: CLINIC | Age: 77
End: 2022-01-07
Payer: MEDICARE

## 2022-01-13 NOTE — PROGRESS NOTES
cyanocobalamin 1000mcg/ml was administered today on (01/13/2022).   The patient recieved the shot in their (R arm) at (3.35pm)      Administered by Lazara An PharmD.  [id number]  Exp: 12/31/2023

## 2022-01-14 ENCOUNTER — PATIENT MESSAGE (OUTPATIENT)
Dept: INTERNAL MEDICINE | Facility: CLINIC | Age: 77
End: 2022-01-14
Payer: MEDICARE

## 2022-01-14 ENCOUNTER — PATIENT MESSAGE (OUTPATIENT)
Dept: RESEARCH | Facility: HOSPITAL | Age: 77
End: 2022-01-14
Payer: MEDICARE

## 2022-01-18 ENCOUNTER — LAB VISIT (OUTPATIENT)
Dept: LAB | Facility: HOSPITAL | Age: 77
End: 2022-01-18
Attending: INTERNAL MEDICINE
Payer: MEDICARE

## 2022-01-18 DIAGNOSIS — E78.5 HYPERLIPIDEMIA, UNSPECIFIED HYPERLIPIDEMIA TYPE: ICD-10-CM

## 2022-01-18 DIAGNOSIS — E53.8 B12 DEFICIENCY: ICD-10-CM

## 2022-01-18 DIAGNOSIS — E11.8 TYPE 2 DIABETES MELLITUS WITH UNSPECIFIED COMPLICATIONS: ICD-10-CM

## 2022-01-18 LAB
ALBUMIN SERPL BCP-MCNC: 4.2 G/DL (ref 3.5–5.2)
ALP SERPL-CCNC: 68 U/L (ref 55–135)
ALT SERPL W/O P-5'-P-CCNC: 20 U/L (ref 10–44)
ANION GAP SERPL CALC-SCNC: 9 MMOL/L (ref 8–16)
AST SERPL-CCNC: 22 U/L (ref 10–40)
BASOPHILS # BLD AUTO: 0.01 K/UL (ref 0–0.2)
BASOPHILS NFR BLD: 0.2 % (ref 0–1.9)
BILIRUB DIRECT SERPL-MCNC: 0.2 MG/DL (ref 0.1–0.3)
BILIRUB SERPL-MCNC: 0.7 MG/DL (ref 0.1–1)
BUN SERPL-MCNC: 20 MG/DL (ref 8–23)
CALCIUM SERPL-MCNC: 10.4 MG/DL (ref 8.7–10.5)
CHLORIDE SERPL-SCNC: 105 MMOL/L (ref 95–110)
CHOLEST SERPL-MCNC: 215 MG/DL (ref 120–199)
CHOLEST/HDLC SERPL: 4.1 {RATIO} (ref 2–5)
CO2 SERPL-SCNC: 24 MMOL/L (ref 23–29)
CREAT SERPL-MCNC: 1 MG/DL (ref 0.5–1.4)
DIFFERENTIAL METHOD: NORMAL
EOSINOPHIL # BLD AUTO: 0.1 K/UL (ref 0–0.5)
EOSINOPHIL NFR BLD: 1.1 % (ref 0–8)
ERYTHROCYTE [DISTWIDTH] IN BLOOD BY AUTOMATED COUNT: 12.2 % (ref 11.5–14.5)
EST. GFR  (AFRICAN AMERICAN): >60 ML/MIN/1.73 M^2
EST. GFR  (NON AFRICAN AMERICAN): 54.5 ML/MIN/1.73 M^2
ESTIMATED AVG GLUCOSE: 148 MG/DL (ref 68–131)
GLUCOSE SERPL-MCNC: 153 MG/DL (ref 70–110)
HBA1C MFR BLD: 6.8 % (ref 4–5.6)
HCT VFR BLD AUTO: 40.1 % (ref 37–48.5)
HDLC SERPL-MCNC: 53 MG/DL (ref 40–75)
HDLC SERPL: 24.7 % (ref 20–50)
HGB BLD-MCNC: 13.1 G/DL (ref 12–16)
IMM GRANULOCYTES # BLD AUTO: 0.01 K/UL (ref 0–0.04)
IMM GRANULOCYTES NFR BLD AUTO: 0.2 % (ref 0–0.5)
LDLC SERPL CALC-MCNC: 143 MG/DL (ref 63–159)
LYMPHOCYTES # BLD AUTO: 2.1 K/UL (ref 1–4.8)
LYMPHOCYTES NFR BLD: 33.9 % (ref 18–48)
MCH RBC QN AUTO: 29.4 PG (ref 27–31)
MCHC RBC AUTO-ENTMCNC: 32.7 G/DL (ref 32–36)
MCV RBC AUTO: 90 FL (ref 82–98)
MONOCYTES # BLD AUTO: 0.6 K/UL (ref 0.3–1)
MONOCYTES NFR BLD: 9.6 % (ref 4–15)
NEUTROPHILS # BLD AUTO: 3.4 K/UL (ref 1.8–7.7)
NEUTROPHILS NFR BLD: 55 % (ref 38–73)
NONHDLC SERPL-MCNC: 162 MG/DL
NRBC BLD-RTO: 0 /100 WBC
PLATELET # BLD AUTO: 214 K/UL (ref 150–450)
PMV BLD AUTO: 10.7 FL (ref 9.2–12.9)
POTASSIUM SERPL-SCNC: 4.9 MMOL/L (ref 3.5–5.1)
PROT SERPL-MCNC: 7.1 G/DL (ref 6–8.4)
RBC # BLD AUTO: 4.45 M/UL (ref 4–5.4)
SODIUM SERPL-SCNC: 138 MMOL/L (ref 136–145)
TRIGL SERPL-MCNC: 95 MG/DL (ref 30–150)
VIT B12 SERPL-MCNC: 786 PG/ML (ref 210–950)
WBC # BLD AUTO: 6.23 K/UL (ref 3.9–12.7)

## 2022-01-18 PROCEDURE — 82607 VITAMIN B-12: CPT | Performed by: INTERNAL MEDICINE

## 2022-01-18 PROCEDURE — 83036 HEMOGLOBIN GLYCOSYLATED A1C: CPT | Performed by: INTERNAL MEDICINE

## 2022-01-18 PROCEDURE — 80048 BASIC METABOLIC PNL TOTAL CA: CPT | Performed by: INTERNAL MEDICINE

## 2022-01-18 PROCEDURE — 80076 HEPATIC FUNCTION PANEL: CPT | Performed by: INTERNAL MEDICINE

## 2022-01-18 PROCEDURE — 80061 LIPID PANEL: CPT | Performed by: INTERNAL MEDICINE

## 2022-01-18 PROCEDURE — 85025 COMPLETE CBC W/AUTO DIFF WBC: CPT | Performed by: INTERNAL MEDICINE

## 2022-01-18 PROCEDURE — 36415 COLL VENOUS BLD VENIPUNCTURE: CPT | Performed by: INTERNAL MEDICINE

## 2022-01-20 ENCOUNTER — OFFICE VISIT (OUTPATIENT)
Dept: INTERNAL MEDICINE | Facility: CLINIC | Age: 77
End: 2022-01-20
Payer: MEDICARE

## 2022-01-20 VITALS
HEART RATE: 73 BPM | OXYGEN SATURATION: 96 % | BODY MASS INDEX: 29.77 KG/M2 | SYSTOLIC BLOOD PRESSURE: 132 MMHG | WEIGHT: 174.38 LBS | DIASTOLIC BLOOD PRESSURE: 78 MMHG | HEIGHT: 64 IN

## 2022-01-20 DIAGNOSIS — Z85.3 HX: BREAST CANCER: ICD-10-CM

## 2022-01-20 DIAGNOSIS — E11.9 TYPE 2 DIABETES MELLITUS WITHOUT COMPLICATION, WITHOUT LONG-TERM CURRENT USE OF INSULIN: Primary | ICD-10-CM

## 2022-01-20 DIAGNOSIS — E53.8 B12 DEFICIENCY: ICD-10-CM

## 2022-01-20 DIAGNOSIS — E78.49 OTHER HYPERLIPIDEMIA: ICD-10-CM

## 2022-01-20 PROCEDURE — 99999 PR PBB SHADOW E&M-EST. PATIENT-LVL IV: ICD-10-PCS | Mod: PBBFAC,,, | Performed by: INTERNAL MEDICINE

## 2022-01-20 PROCEDURE — 99214 OFFICE O/P EST MOD 30 MIN: CPT | Mod: S$PBB,,, | Performed by: INTERNAL MEDICINE

## 2022-01-20 PROCEDURE — 99999 PR PBB SHADOW E&M-EST. PATIENT-LVL IV: CPT | Mod: PBBFAC,,, | Performed by: INTERNAL MEDICINE

## 2022-01-20 PROCEDURE — 99214 PR OFFICE/OUTPT VISIT, EST, LEVL IV, 30-39 MIN: ICD-10-PCS | Mod: S$PBB,,, | Performed by: INTERNAL MEDICINE

## 2022-01-20 PROCEDURE — 99214 OFFICE O/P EST MOD 30 MIN: CPT | Mod: PBBFAC | Performed by: INTERNAL MEDICINE

## 2022-01-20 NOTE — PROGRESS NOTES
"Subjective:       Patient ID: Loni Heard is a 76 y.o. female.    Chief Complaint: Follow-up   this is a 76-year-old who presents today for follow-up she reports that she has been doing well maybe not as good with her diet over the holidays and not swimming as much but she does continue to exercise and has been taking her medicines regularly patient reports that she is planning on doing some college classes online in [place] /environmental law and [employer] .  She has remained on her letrozole takes it 4 times a week and continues to follow with her outlying specialist including her breast surgeon and her hematologist oncologist she does have some trouble with some joint issues and aches and relates to the letrozole.  She has been taking some anti-inflammatories during this time frame She also continues to follow with her outlying gastroenterologist reports they may do another scope on her she has been taking her B12 and feels better when she takes it twice monthly    HPI  Review of Systems   HENT:        Occasional mouth ulcer palate   None currently    Cardiovascular: Negative for chest pain.   Musculoskeletal:        Arthirtis some improvement        Objective:     Blood pressure 132/78, pulse 73, height 5' 4" (1.626 m), weight 79.1 kg (174 lb 6.1 oz), SpO2 96 %.    Physical Exam  Constitutional:       General: She is not in acute distress.  HENT:      Head: Normocephalic.      Comments: Oropharynx clear      Mouth/Throat:      Mouth: Oropharynx is clear and moist.   Cardiovascular:      Rate and Rhythm: Normal rate and regular rhythm.      Heart sounds: Normal heart sounds. No murmur heard.  No friction rub. No gallop.       Comments: Surgical scar left breast  Radiation changes   Pulmonary:      Effort: Pulmonary effort is normal. No respiratory distress.      Breath sounds: Normal breath sounds.   Abdominal:      General: Bowel sounds are normal.      Palpations: Abdomen is soft. There is no mass. "      Tenderness: There is no abdominal tenderness.   Musculoskeletal:         General: No edema.      Cervical back: Neck supple.      Comments: Discoloration toneail    Skin:     Findings: No erythema.   Neurological:      Mental Status: She is alert.   Psychiatric:         Mood and Affect: Mood and affect normal.         Assessment:       1. Type 2 diabetes mellitus without complication, without long-term current use of insulin    2. Other hyperlipidemia    3. B12 deficiency    4. HX: breast cancer        Plan:       Loni was seen today for follow-up.    Diagnoses and all orders for this visit:    Type 2 diabetes mellitus without complication, without long-term current use of insulin   history of A1c slight trend up reinforced diet and   -     Microalbumin/Creatinine Ratio, Urine; Future  -     CBC Auto Differential; Future  -     Basic Metabolic Panel; Future  -     Hemoglobin A1C; Future  -     Hepatic Function Panel; Future  -     Vitamin B12; Future    Other hyperlipidemia   remains on zetia     B12 deficiency  Hx atrophic gastritis    remains on B12  follws with her gastroenteroogist   -     Vitamin B12; Future     discussed trend down in renal function is continue hydration and minimize anti-inflammatory medications recommended    HX: breast cancer   remains on letrozole she tolerates it 4 times weekly if she goes higher patient reports blood pressure fluctuates her home blood pressure has been good to continues to follow with her specialist for her mammograms and follow-up some will continue to do so    Labs reviewed      she had her COVID vaccines and flu shot we discussed Shingrix vaccine she plans to do in the future

## 2022-01-28 ENCOUNTER — LAB VISIT (OUTPATIENT)
Dept: LAB | Facility: HOSPITAL | Age: 77
End: 2022-01-28
Attending: INTERNAL MEDICINE
Payer: MEDICARE

## 2022-01-28 DIAGNOSIS — E11.9 TYPE 2 DIABETES MELLITUS WITHOUT COMPLICATION, WITHOUT LONG-TERM CURRENT USE OF INSULIN: ICD-10-CM

## 2022-01-28 LAB
ALBUMIN/CREAT UR: NORMAL UG/MG (ref 0–30)
CREAT UR-MCNC: 51 MG/DL (ref 15–325)
MICROALBUMIN UR DL<=1MG/L-MCNC: <5 UG/ML

## 2022-01-28 PROCEDURE — 82043 UR ALBUMIN QUANTITATIVE: CPT | Performed by: INTERNAL MEDICINE

## 2022-01-28 PROCEDURE — 82570 ASSAY OF URINE CREATININE: CPT | Performed by: INTERNAL MEDICINE

## 2022-01-31 ENCOUNTER — PATIENT MESSAGE (OUTPATIENT)
Dept: INTERNAL MEDICINE | Facility: CLINIC | Age: 77
End: 2022-01-31
Payer: MEDICARE

## 2022-02-21 RX ORDER — EZETIMIBE 10 MG/1
TABLET ORAL
Qty: 90 TABLET | Refills: 3 | Status: SHIPPED | OUTPATIENT
Start: 2022-02-21 | End: 2022-03-09

## 2022-02-21 NOTE — TELEPHONE ENCOUNTER
No new care gaps identified.  Powered by [employer]. Reference number: [id number].   2/21/2022 3:35:57 AM CST

## 2022-03-09 RX ORDER — EZETIMIBE 10 MG/1
TABLET ORAL
Qty: 90 TABLET | Refills: 3 | Status: SHIPPED | OUTPATIENT
Start: 2022-03-09 | End: 2022-09-28 | Stop reason: ALTCHOICE

## 2022-03-09 NOTE — TELEPHONE ENCOUNTER
Refill Authorization Note   Loni Heard  is requesting a refill authorization.  Brief Assessment and Rationale for Refill:  Approve     Medication Therapy Plan:       Medication Reconciliation Completed: No   Comments:   --->Care Gap information included below if applicable.       Requested Prescriptions   Pending Prescriptions Disp Refills    ezetimibe (ZETIA) 10 mg tablet [Pharmacy Med Name: EZETIMIBE 10MG TABLETS] 90 tablet 3     Sig: TAKE 1 TABLET(10 MG) BY MOUTH EVERY DAY       Cardiovascular:  Antilipid - Sterol Transport Inhibitors Passed - 3/9/2022 12:05 PM        Passed - Patient is at least 18 years old        Passed - Valid encounter within last 15 months     Recent Visits  Date Type Provider Dept   01/20/22 Office Visit Mariaelena Mallory MD [facility]   09/20/21 Office Visit Mariaelena Mallory MD [facility]   05/19/21 Office Visit Mariaelena Mallory MD [facility]   01/19/21 Office Visit Mariaelena Mallory MD [facility]   09/16/20 Office Visit Mariaelena Mallory MD [facility]   04/20/20 Office Visit Mariaelena Mallory MD [facility]   Showing recent visits within past 720 days and meeting all other requirements  Future Appointments  No visits were found meeting these conditions.  Showing future appointments within next 150 days and meeting all other requirements      Future Appointments              In 2 months LAB, APPOINTMENT [facility]    In 2 months [facility]                Passed - Total Cholesterol within 360 days     Lab Results   Component Value Date    CHOL 215 (H) 01/18/2022    CHOL 229 (H) 05/13/2021    CHOL 226 (H) 08/13/2020              Passed - LDL within 360 days     LDL Cholesterol   Date Value Ref Range Status   01/18/2022 143.0 63.0 - 159.0 mg/dL Final      Comment:     The National Cholesterol Education Program (NCEP) has set the  following guidelines (reference values) for LDL Cholesterol:  Optimal.......................<130 mg/dL  Borderline High...............130-159 mg/dL  High..........................160-189 mg/dL  Very High.....................>190 mg/dL              Passed - HDL within 360 days     HDL   Date Value Ref Range Status   01/18/2022 53 40 - 75 mg/dL Final     Comment:     The National Cholesterol Education Program (NCEP) has set the  following guidelines (reference values) for HDL Cholesterol:  Low...............<40 mg/dL  Optimal...........>60 mg/dL              Passed - Triglycerides within 360 days     Lab Results   Component Value Date    TRIG 95 01/18/2022    TRIG 140 05/13/2021    TRIG 114 08/13/2020              Passed - AST is between 0 and 119 and within 360 days     AST   Date Value Ref Range Status   01/18/2022 22 10 - 40 U/L Final   09/16/2021 22 10 - 40 U/L Final   05/13/2021 20 10 - 40 U/L Final              Passed - ALT is between 0 and 131 and within 360 days     ALT   Date Value Ref Range Status   01/18/2022 20 10 - 44 U/L Final   09/16/2021 19 10 - 44 U/L Final   05/13/2021 18 10 - 44 U/L Final                  Appointments  past 12m or future 3m with PCP    Date Provider   Last Visit   1/20/2022 Mariaelena Mallory MD   Next Visit   5/20/2022 Mariaelena Mallory MD   ED visits in past 90 days: 0     Note composed:12:11 PM 03/09/2022

## 2022-03-09 NOTE — TELEPHONE ENCOUNTER
No new care gaps identified.  Powered by [employer]. Reference number: [id number].   3/09/2022 12:05:54 PM CST

## 2022-03-10 ENCOUNTER — DOCUMENTATION ONLY (OUTPATIENT)
Dept: PHARMACY | Facility: CLINIC | Age: 77
End: 2022-03-10
Payer: MEDICARE

## 2022-03-10 NOTE — PROGRESS NOTES
Patient received B12 injection in the left deltoid.       Lot Number:   Expiration Date: 6/2023   By Kate Jesus [id number]   Exp 3/2022

## 2022-05-14 ENCOUNTER — IMMUNIZATION (OUTPATIENT)
Dept: INTERNAL MEDICINE | Facility: CLINIC | Age: 77
End: 2022-05-14
Payer: MEDICARE

## 2022-05-14 DIAGNOSIS — Z23 NEED FOR VACCINATION: Primary | ICD-10-CM

## 2022-05-14 PROCEDURE — 91305 COVID-19, MRNA, LNP-S, PF, 30 MCG/0.3 ML DOSE VACCINE (PFIZER): CPT | Mod: PBBFAC

## 2022-05-17 ENCOUNTER — LAB VISIT (OUTPATIENT)
Dept: LAB | Facility: HOSPITAL | Age: 77
End: 2022-05-17
Attending: INTERNAL MEDICINE
Payer: MEDICARE

## 2022-05-17 DIAGNOSIS — E11.9 TYPE 2 DIABETES MELLITUS WITHOUT COMPLICATION, WITHOUT LONG-TERM CURRENT USE OF INSULIN: ICD-10-CM

## 2022-05-17 DIAGNOSIS — E53.8 B12 DEFICIENCY: ICD-10-CM

## 2022-05-17 LAB
ALBUMIN SERPL BCP-MCNC: 3.9 G/DL (ref 3.5–5.2)
ALP SERPL-CCNC: 77 U/L (ref 55–135)
ALT SERPL W/O P-5'-P-CCNC: 23 U/L (ref 10–44)
ANION GAP SERPL CALC-SCNC: 9 MMOL/L (ref 8–16)
AST SERPL-CCNC: 23 U/L (ref 10–40)
BASOPHILS # BLD AUTO: 0.01 K/UL (ref 0–0.2)
BASOPHILS NFR BLD: 0.2 % (ref 0–1.9)
BILIRUB DIRECT SERPL-MCNC: 0.1 MG/DL (ref 0.1–0.3)
BILIRUB SERPL-MCNC: 0.5 MG/DL (ref 0.1–1)
BUN SERPL-MCNC: 16 MG/DL (ref 8–23)
CALCIUM SERPL-MCNC: 9.6 MG/DL (ref 8.7–10.5)
CHLORIDE SERPL-SCNC: 104 MMOL/L (ref 95–110)
CO2 SERPL-SCNC: 27 MMOL/L (ref 23–29)
CREAT SERPL-MCNC: 0.9 MG/DL (ref 0.5–1.4)
DIFFERENTIAL METHOD: NORMAL
EOSINOPHIL # BLD AUTO: 0.1 K/UL (ref 0–0.5)
EOSINOPHIL NFR BLD: 1.2 % (ref 0–8)
ERYTHROCYTE [DISTWIDTH] IN BLOOD BY AUTOMATED COUNT: 12 % (ref 11.5–14.5)
EST. GFR  (AFRICAN AMERICAN): >60 ML/MIN/1.73 M^2
EST. GFR  (NON AFRICAN AMERICAN): >60 ML/MIN/1.73 M^2
ESTIMATED AVG GLUCOSE: 154 MG/DL (ref 68–131)
GLUCOSE SERPL-MCNC: 162 MG/DL (ref 70–110)
HBA1C MFR BLD: 7 % (ref 4–5.6)
HCT VFR BLD AUTO: 41.6 % (ref 37–48.5)
HGB BLD-MCNC: 13.6 G/DL (ref 12–16)
IMM GRANULOCYTES # BLD AUTO: 0.03 K/UL (ref 0–0.04)
IMM GRANULOCYTES NFR BLD AUTO: 0.5 % (ref 0–0.5)
LYMPHOCYTES # BLD AUTO: 1.9 K/UL (ref 1–4.8)
LYMPHOCYTES NFR BLD: 33.6 % (ref 18–48)
MCH RBC QN AUTO: 29.8 PG (ref 27–31)
MCHC RBC AUTO-ENTMCNC: 32.7 G/DL (ref 32–36)
MCV RBC AUTO: 91 FL (ref 82–98)
MONOCYTES # BLD AUTO: 0.7 K/UL (ref 0.3–1)
MONOCYTES NFR BLD: 11.3 % (ref 4–15)
NEUTROPHILS # BLD AUTO: 3.1 K/UL (ref 1.8–7.7)
NEUTROPHILS NFR BLD: 53.2 % (ref 38–73)
NRBC BLD-RTO: 0 /100 WBC
PLATELET # BLD AUTO: 241 K/UL (ref 150–450)
PMV BLD AUTO: 11.2 FL (ref 9.2–12.9)
POTASSIUM SERPL-SCNC: 4.4 MMOL/L (ref 3.5–5.1)
PROT SERPL-MCNC: 7 G/DL (ref 6–8.4)
RBC # BLD AUTO: 4.56 M/UL (ref 4–5.4)
SODIUM SERPL-SCNC: 140 MMOL/L (ref 136–145)
VIT B12 SERPL-MCNC: 469 PG/ML (ref 210–950)
WBC # BLD AUTO: 5.75 K/UL (ref 3.9–12.7)

## 2022-05-17 PROCEDURE — 80048 BASIC METABOLIC PNL TOTAL CA: CPT | Performed by: INTERNAL MEDICINE

## 2022-05-17 PROCEDURE — 36415 COLL VENOUS BLD VENIPUNCTURE: CPT | Performed by: INTERNAL MEDICINE

## 2022-05-17 PROCEDURE — 85025 COMPLETE CBC W/AUTO DIFF WBC: CPT | Performed by: INTERNAL MEDICINE

## 2022-05-17 PROCEDURE — 82607 VITAMIN B-12: CPT | Performed by: INTERNAL MEDICINE

## 2022-05-17 PROCEDURE — 80076 HEPATIC FUNCTION PANEL: CPT | Performed by: INTERNAL MEDICINE

## 2022-05-17 PROCEDURE — 83036 HEMOGLOBIN GLYCOSYLATED A1C: CPT | Performed by: INTERNAL MEDICINE

## 2022-05-27 ENCOUNTER — OFFICE VISIT (OUTPATIENT)
Dept: INTERNAL MEDICINE | Facility: CLINIC | Age: 77
End: 2022-05-27
Payer: MEDICARE

## 2022-05-27 ENCOUNTER — DOCUMENTATION ONLY (OUTPATIENT)
Dept: PHARMACY | Facility: CLINIC | Age: 77
End: 2022-05-27
Payer: MEDICARE

## 2022-05-27 VITALS
HEART RATE: 80 BPM | OXYGEN SATURATION: 100 % | DIASTOLIC BLOOD PRESSURE: 78 MMHG | WEIGHT: 173.75 LBS | HEIGHT: 64 IN | SYSTOLIC BLOOD PRESSURE: 155 MMHG | BODY MASS INDEX: 29.66 KG/M2

## 2022-05-27 DIAGNOSIS — E11.8 TYPE 2 DIABETES MELLITUS WITH UNSPECIFIED COMPLICATIONS: Primary | ICD-10-CM

## 2022-05-27 DIAGNOSIS — E53.8 B12 DEFICIENCY: ICD-10-CM

## 2022-05-27 DIAGNOSIS — I10 PRIMARY HYPERTENSION: ICD-10-CM

## 2022-05-27 DIAGNOSIS — R22.2 ABDOMINAL WALL LUMP: ICD-10-CM

## 2022-05-27 DIAGNOSIS — E78.5 HYPERLIPIDEMIA, UNSPECIFIED HYPERLIPIDEMIA TYPE: ICD-10-CM

## 2022-05-27 DIAGNOSIS — Z85.3 HX: BREAST CANCER: ICD-10-CM

## 2022-05-27 PROCEDURE — 99999 PR PBB SHADOW E&M-EST. PATIENT-LVL V: ICD-10-PCS | Mod: PBBFAC,,, | Performed by: INTERNAL MEDICINE

## 2022-05-27 PROCEDURE — 99214 PR OFFICE/OUTPT VISIT, EST, LEVL IV, 30-39 MIN: ICD-10-PCS | Mod: S$PBB,,, | Performed by: INTERNAL MEDICINE

## 2022-05-27 PROCEDURE — 99214 OFFICE O/P EST MOD 30 MIN: CPT | Mod: S$PBB,,, | Performed by: INTERNAL MEDICINE

## 2022-05-27 PROCEDURE — 99999 PR PBB SHADOW E&M-EST. PATIENT-LVL V: CPT | Mod: PBBFAC,,, | Performed by: INTERNAL MEDICINE

## 2022-05-27 PROCEDURE — 99215 OFFICE O/P EST HI 40 MIN: CPT | Mod: PBBFAC | Performed by: INTERNAL MEDICINE

## 2022-05-27 RX ORDER — AMLODIPINE BESYLATE 5 MG/1
5 TABLET ORAL DAILY
Qty: 90 TABLET | Refills: 3 | Status: SHIPPED | OUTPATIENT
Start: 2022-05-27

## 2022-05-27 RX ORDER — CYANOCOBALAMIN 1000 UG/ML
INJECTION, SOLUTION INTRAMUSCULAR; SUBCUTANEOUS
Qty: 9 ML | Refills: 11 | Status: SHIPPED | OUTPATIENT
Start: 2022-05-27 | End: 2023-01-17 | Stop reason: SDUPTHER

## 2022-05-27 NOTE — PROGRESS NOTES
Patient received B12 injection in the right deltoid.     Lot Number:    Expiration Date: 6/2023   By Kate Jesus [id number]   Exp 3/2024

## 2022-05-27 NOTE — PROGRESS NOTES
"Subjective:       Patient ID: Loni Heard is a 76 y.o. female.    Chief Complaint: Follow-up  This is a 76-year-old who presents today for follow-up she reports that she has been under some increased situational stress recently was taking Coastal restoration class and it was all online which had a lot of test and reports that she was having to work through the course put her on some increased stressed when she saw her A1c the other day she started back to swimming she had really been doing that as much recently she has some trouble with eustachian dysfunction on occasion .  She has recently seen her outlying gastroenterologist and they did not recommended repeat scope she does take B12 has been taking it twice monthly and has an area and her abdomen that has been bothering her a little cyst or lipoma but she feels it is enlarging and she has had an ultrasound with her gastroenterologist previously that she would like to go see a surgeon to get a consult for possibly removing it.  She notices her blood pressure has been trending up as well she had previously been on amlodipine but stopped it when her pressures went down in the past.    HPI  Review of Systems   Gastrointestinal:        Lump abdomen   Wants surgeyr consult had recent gi appt   With her outlying gastro doctor    Musculoskeletal:        Toe bothers her prior injury    Psychiatric/Behavioral:        Stressors recent course online completed       Objective:     Blood pressure (!) 155/78, pulse 80, height 5' 4" (1.626 m), weight 78.8 kg (173 lb 11.6 oz), SpO2 100 %.    Physical Exam  Constitutional:       General: She is not in acute distress.  HENT:      Head: Normocephalic.   Eyes:      General: No scleral icterus.  Cardiovascular:      Rate and Rhythm: Normal rate and regular rhythm.      Heart sounds: Normal heart sounds. No murmur heard.    No friction rub. No gallop.      Comments: Surgical/radiation changes some discomfort   Pulmonary:      " Effort: Pulmonary effort is normal. No respiratory distress.      Breath sounds: Normal breath sounds.   Abdominal:      General: Bowel sounds are normal.      Palpations: Abdomen is soft. There is no mass.      Tenderness: There is no abdominal tenderness.      Comments: Palpable lipomatous area    Musculoskeletal:      Cervical back: Neck supple.   Skin:     Findings: No erythema.   Neurological:      Mental Status: She is alert.         Assessment:       1. Type 2 diabetes mellitus with unspecified complications    2. Hyperlipidemia, unspecified hyperlipidemia type    3. Abdominal wall lump    4. Primary hypertension    5. B12 deficiency    6. HX: breast cancer        Plan:       Loni was seen today for follow-up.    Diagnoses and all orders for this visit:    Type 2 diabetes mellitus with unspecified complications  Slight trend up an A1c increased exercise will continue current regimen  -     Hemoglobin A1C; Future  -     Basic Metabolic Panel; Future  -     Lipid Panel; Future    Hyperlipidemia, unspecified hyperlipidemia type  Remains on Zetia    Abdominal wall lump  -     Ambulatory referral/consult to General Surgery; Future  Referral placed for scheduing as requested     Primary hypertension  Discussed her blood pressure recently more persistently elevated she can resume her amlodipine at 5 mg home monitoring call with highs or lows    B12 deficiency  Taking her B12 twice monthly would like to continue that dose as she feels better     HX: breast cancer  She has upcoming appointment with her breast specialist and mammogram planned    For eustachian dysfunction we discussed she can use an antihistamine or Flonase may help    Other orders  -     cyanocobalamin 1,000 mcg/mL injection; 1000 mcg IM Twice monthly  -     amLODIPine (NORVASC) 5 MG tablet; Take 1 tablet (5 mg total) by mouth once daily.    Follow up 4 months

## 2022-06-01 ENCOUNTER — OFFICE VISIT (OUTPATIENT)
Dept: ORTHOPEDICS | Facility: CLINIC | Age: 77
End: 2022-06-01
Payer: MEDICARE

## 2022-06-01 ENCOUNTER — HOSPITAL ENCOUNTER (OUTPATIENT)
Dept: RADIOLOGY | Facility: HOSPITAL | Age: 77
Discharge: HOME | End: 2022-06-01
Attending: ORTHOPAEDIC SURGERY
Payer: MEDICARE

## 2022-06-01 VITALS — HEIGHT: 64 IN | WEIGHT: 173.75 LBS | BODY MASS INDEX: 29.66 KG/M2

## 2022-06-01 DIAGNOSIS — M79.673 PAIN OF FOOT, UNSPECIFIED LATERALITY: Primary | ICD-10-CM

## 2022-06-01 DIAGNOSIS — M79.673 PAIN OF FOOT, UNSPECIFIED LATERALITY: ICD-10-CM

## 2022-06-01 PROCEDURE — 73660 X-RAY EXAM OF TOE(S): CPT | Mod: TC,LT

## 2022-06-01 PROCEDURE — 99214 OFFICE O/P EST MOD 30 MIN: CPT | Mod: PBBFAC | Performed by: ORTHOPAEDIC SURGERY

## 2022-06-01 PROCEDURE — 99213 OFFICE O/P EST LOW 20 MIN: CPT | Mod: S$PBB,,, | Performed by: ORTHOPAEDIC SURGERY

## 2022-06-01 PROCEDURE — 99213 PR OFFICE/OUTPT VISIT, EST, LEVL III, 20-29 MIN: ICD-10-PCS | Mod: S$PBB,,, | Performed by: ORTHOPAEDIC SURGERY

## 2022-06-01 PROCEDURE — 99999 PR PBB SHADOW E&M-EST. PATIENT-LVL IV: ICD-10-PCS | Mod: PBBFAC,,, | Performed by: ORTHOPAEDIC SURGERY

## 2022-06-01 PROCEDURE — 73660 X-RAY EXAM OF TOE(S): CPT | Mod: 26,LT,, | Performed by: RADIOLOGY

## 2022-06-01 PROCEDURE — 73660 PR  X-RAY TOE(S): ICD-10-PCS | Mod: 26,LT,, | Performed by: RADIOLOGY

## 2022-06-01 PROCEDURE — 99999 PR PBB SHADOW E&M-EST. PATIENT-LVL IV: CPT | Mod: PBBFAC,,, | Performed by: ORTHOPAEDIC SURGERY

## 2022-06-01 NOTE — PROGRESS NOTES
Loni Heard   Returns today for follow-up.  This is a 76-year-old active female who I last saw in June 2019 for a right ankle sprain.  She comes in today because she reports that about a year and a half ago she hit her left 5th toe on a table had some pain and swelling and bruising.  She had sent a picture to us at that time and all of her toes were normal alignment and she did not have any formal treatment.  She reports that her symptoms resolved in a normal amount of time but she has some occasional discomfort in toe and feels like she might have a bone spur.  She comes in for evaluation.  She states this problem does not prevent her from doing normal activities.  She swims for exercise.    Examination:  She walks in today with a normal gait.  She has normal alignment of all of her toes including the 5th toe on the left foot.  She has painless motion of the left 5th toe at the MTP joint.  There is some mild bony prominence on the plantar aspect underneath the proximal phalanx.  She is neurovascularly intact.    Imaging:  I ordered and reviewed x-ray of her left foot today.  There are some mild arthritic changes of the interphalangeal joints of her lesser toes but no obvious deformity or evidence of displaced fractures.    Impression:  Mild left 5th toe pain    Recommendation:  I reassured her that there are no significant structural abnormalities that would require any intervention.  She should continue with her current level of activity.    Follow-up as needed

## 2022-06-03 ENCOUNTER — OFFICE VISIT (OUTPATIENT)
Dept: SURGERY | Facility: CLINIC | Age: 77
End: 2022-06-03
Payer: MEDICARE

## 2022-06-03 VITALS
WEIGHT: 172.75 LBS | HEIGHT: 64 IN | HEART RATE: 66 BPM | BODY MASS INDEX: 29.49 KG/M2 | DIASTOLIC BLOOD PRESSURE: 75 MMHG | SYSTOLIC BLOOD PRESSURE: 154 MMHG

## 2022-06-03 DIAGNOSIS — R22.2 ABDOMINAL WALL LUMP: ICD-10-CM

## 2022-06-03 PROCEDURE — 99203 PR OFFICE/OUTPT VISIT, NEW, LEVL III, 30-44 MIN: ICD-10-PCS | Mod: S$PBB,,, | Performed by: SURGERY

## 2022-06-03 PROCEDURE — 99203 OFFICE O/P NEW LOW 30 MIN: CPT | Mod: S$PBB,,, | Performed by: SURGERY

## 2022-06-03 PROCEDURE — 99999 PR PBB SHADOW E&M-EST. PATIENT-LVL IV: ICD-10-PCS | Mod: PBBFAC,,, | Performed by: SURGERY

## 2022-06-03 PROCEDURE — 99214 OFFICE O/P EST MOD 30 MIN: CPT | Mod: PBBFAC | Performed by: SURGERY

## 2022-06-03 PROCEDURE — 99999 PR PBB SHADOW E&M-EST. PATIENT-LVL IV: CPT | Mod: PBBFAC,,, | Performed by: SURGERY

## 2022-06-03 NOTE — PROGRESS NOTES
"  History & Physical    SUBJECTIVE:     History of Present Illness:  Patient is a 76 y.o. female presents with abdominal wall mass.  Very small, causes pain with long periods of standing.  Tender to touch.      Review of patient's allergies indicates:   Allergen Reactions    Codeine Anaphylaxis     Other reaction(s): Anaphylaxis    Penicillins      Other reaction(s): dizzy     Niacin preparations Other (See Comments)     Flushing/lips swelling        Current Outpatient Medications   Medication Sig Dispense Refill    amLODIPine (NORVASC) 5 MG tablet Take 1 tablet (5 mg total) by mouth once daily. 90 tablet 3    ASCORBATE CALCIUM, VITAMIN C, ORAL Take by mouth.      B complex-vitamin C-folic acid (JOE-NAYLA/NEPHRO-NAYLA) 0.8 mg Tab       blood sugar diagnostic Strp Use for blood sugar testing once daily -once touch ultra test strips 50 each 4    blood-glucose meter Misc Use for blood sugar testing as instructed once daily - product selection permitted 1 each 0    cholecalciferol, vitamin D3, (VITAMIN D3) 2,000 unit Tab Take 2 tablets (4,000 Units total) by mouth once daily.      coenzyme Q10 10 mg capsule Take 10 mg by mouth.      cyanocobalamin 1,000 mcg/mL injection 1000 mcg IM Twice monthly 9 mL 11    estradioL (ESTRACE) 0.01 % (0.1 mg/gram) vaginal cream       ezetimibe (ZETIA) 10 mg tablet TAKE 1 TABLET(10 MG) BY MOUTH EVERY DAY 90 tablet 3    lancets Misc Use for blood sugar testing once daily - one touch delica lanets 30 g 50 each 3    letrozole (FEMARA) 2.5 mg Tab Take 2.5 mg by mouth.      magnesium 250 mg Tab Take by mouth.      SITagliptin (JANUVIA) 100 MG Tab TAKE 1 TABLET(100 MG) BY MOUTH EVERY DAY 90 tablet 3    syringe with needle, safety (BD INTEGRA SYRINGE) 3 mL 25 gauge x 1" Syrg for use with b 2 injection 100 Syringe 6    vit C/E/Zn/coppr/lutein/zeaxan (PRESERVISION AREDS-2 ORAL)       vitamin D3-vitamin K2, MK4, (K2 PLUS D3) 1,000-100 unit-mcg Tab       VITAMIN K2 ORAL Take by " mouth.       Current Facility-Administered Medications   Medication Dose Route Frequency Provider Last Rate Last Admin    cyanocobalamin injection 1,000 mcg  1,000 mcg Intramuscular Q14 Days Mariaelena Mallory MD           Past Medical History:   Diagnosis Date    Allergy     hx rhinitis     Asthma     childhood asthma     Atrophic gastritis     B12 deficiency     Cancer     left breast invasive ductal carcinoma     Cataract     Depression     Diabetes mellitus type II     Hyperlipidemia     Hypertension     Mild vitamin D deficiency     Pulmonary nodule     micronodule 8/2017      Past Surgical History:   Procedure Laterality Date    BREAST SURGERY Left 07/2018    left breast lumpectomy     eyelid cyst removed      foreign body removed upper airway       left ankle fracture and repair/orif       neuroma removed      right foot     TONSILLECTOMY      UPPER GASTROINTESTINAL ENDOSCOPY       Family History   Problem Relation Age of Onset    Parkinsonism Mother     Dementia Mother     Stroke Father     Diabetes Father     Diabetes Brother     Thyroid disease Paternal Uncle     Cataracts Maternal Grandfather     Amblyopia Neg Hx     Blindness Neg Hx     Cancer Neg Hx     Glaucoma Neg Hx     Hypertension Neg Hx     Macular degeneration Neg Hx     Retinal detachment Neg Hx     Strabismus Neg Hx      Social History     Tobacco Use    Smoking status: Never Smoker    Smokeless tobacco: Never Used   Substance Use Topics    Alcohol use: No     Comment: rarely    Drug use: No        Review of Systems:  Review of Systems   Constitutional: Negative for activity change, appetite change, chills, diaphoresis, fatigue and fever.   HENT: Negative for congestion, sinus pressure, sneezing and sore throat.    Eyes: Negative for pain, discharge, redness, itching and visual disturbance.   Respiratory: Negative for apnea, cough, choking, chest tightness and shortness of breath.    Cardiovascular:  Negative for chest pain, palpitations and leg swelling.   Gastrointestinal: Negative for abdominal distention, abdominal pain, constipation, diarrhea, nausea and vomiting.   Musculoskeletal: Negative for arthralgias, back pain and gait problem.   Skin: Negative for color change, pallor and rash.   Neurological: Negative for dizziness, facial asymmetry, light-headedness and headaches.   Psychiatric/Behavioral: Negative for agitation, behavioral problems and confusion.       OBJECTIVE:     Vital Signs (Most Recent)              Physical Exam:  Physical Exam  Constitutional:       General: She is not in acute distress.     Appearance: Normal appearance. She is not diaphoretic.   HENT:      Head: Normocephalic and atraumatic.      Right Ear: External ear normal.      Left Ear: External ear normal.   Eyes:      General: No scleral icterus.        Right eye: No discharge.         Left eye: No discharge.   Cardiovascular:      Rate and Rhythm: Normal rate and regular rhythm.   Pulmonary:      Effort: Pulmonary effort is normal. No respiratory distress.   Abdominal:      General: There is no distension.      Palpations: Abdomen is soft. There is no mass.      Tenderness: There is no abdominal tenderness. There is no guarding or rebound.      Hernia: No hernia is present.      Comments: Small mass of abdominal wall.  subcentimeter.   Musculoskeletal:         General: Normal range of motion.   Skin:     General: Skin is warm and dry.      Coloration: Skin is not pale.      Findings: No erythema or rash.   Neurological:      Mental Status: She is alert and oriented to person, place, and time.   Psychiatric:         Mood and Affect: Mood normal.         Behavior: Behavior normal.         Thought Content: Thought content normal.         Judgment: Judgment normal.         ASSESSMENT/PLAN:     Abdomnial wall mass    PLAN:Plan     To minors for removal         Osmel Hendricks MD

## 2022-06-20 ENCOUNTER — PROCEDURE VISIT (OUTPATIENT)
Dept: SURGERY | Facility: CLINIC | Age: 77
End: 2022-06-20
Payer: MEDICARE

## 2022-06-20 VITALS
DIASTOLIC BLOOD PRESSURE: 73 MMHG | HEART RATE: 64 BPM | WEIGHT: 174.63 LBS | BODY MASS INDEX: 29.81 KG/M2 | HEIGHT: 64 IN | SYSTOLIC BLOOD PRESSURE: 152 MMHG

## 2022-06-20 DIAGNOSIS — R19.00 MASS OF SOFT TISSUE OF ABDOMEN: Primary | ICD-10-CM

## 2022-06-20 PROCEDURE — 88304 PR  SURG PATH,LEVEL III: ICD-10-PCS | Mod: 26,,, | Performed by: PATHOLOGY

## 2022-06-20 PROCEDURE — 22903 EXC, TUMOR SOFT TISSUE: ICD-10-PCS | Mod: S$PBB,,, | Performed by: SURGERY

## 2022-06-20 PROCEDURE — 88304 TISSUE EXAM BY PATHOLOGIST: CPT | Mod: 26,,, | Performed by: PATHOLOGY

## 2022-06-20 PROCEDURE — 21930 EXC BACK LES SC < 3 CM: CPT | Mod: PBBFAC | Performed by: SURGERY

## 2022-06-20 PROCEDURE — 22903 EXC ABD LES SC 3 CM/>: CPT | Mod: PBBFAC | Performed by: SURGERY

## 2022-06-20 PROCEDURE — 88304 TISSUE EXAM BY PATHOLOGIST: CPT | Performed by: PATHOLOGY

## 2022-06-20 NOTE — PROCEDURES
Exc, Tumor Soft Tissue    Date/Time: 6/20/2022 1:00 PM  Performed by: Osmel Hendricks Jr., MD  Authorized by: Osmel Hendricks Jr., MD     Consent Done?:  Yes (Written)  Timeout: prior to procedure the correct patient, procedure, and site was verified    Prep: patient was prepped and draped in usual sterile fashion    Local anesthesia used?: Yes    Anesthesia:  Local infiltration  Local anesthetic:  Lidocaine 1% with epinephrine  Anesthetic total (ml):  5  Assistants?: Yes    List of assistants:  Kamla White MD   I was present for the entire procedure.  Indications:  Lipoma  Body area:  Abdomen  Laterality:  Left  Position:  Supine   Patient was prepped and draped in the normal sterile fashion.  Anesthesia:  Local infiltration  Local anesthetic:  Lidocaine 1% with epinephrine  Excision type:  Tumor  Excision depth:  Subcutaneous  Excision size (cm):  3  Scalpel size:  15  Incision type:  Single straight  Specimens?: No     Hemostasis was obtained.  Estimated blood loss (cc):  3  Wound closure:  Intermediate layered  Wound repair size (cm):  3  Sutures:  3-0 Vicryl and 4-0 Monocryl  Dressings: DermaBond.  Post-op diagnosis:  Same as pre-op diagnosis.   Needle, instrument, and sponge counts were correct.   Patient tolerated the procedure well with no immediate complications.   Post-operative instructions were provided for the patient.   Patient was discharged and will follow up for wound check and pathology results.

## 2022-06-27 LAB
FINAL PATHOLOGIC DIAGNOSIS: NORMAL
Lab: NORMAL

## 2022-07-06 ENCOUNTER — OFFICE VISIT (OUTPATIENT)
Dept: SURGERY | Facility: CLINIC | Age: 77
End: 2022-07-06
Payer: MEDICARE

## 2022-07-06 VITALS
DIASTOLIC BLOOD PRESSURE: 73 MMHG | HEART RATE: 65 BPM | WEIGHT: 173.38 LBS | SYSTOLIC BLOOD PRESSURE: 146 MMHG | HEIGHT: 64 IN | BODY MASS INDEX: 29.6 KG/M2

## 2022-07-06 DIAGNOSIS — Z09 POSTOP CHECK: Primary | ICD-10-CM

## 2022-07-06 PROCEDURE — 99214 OFFICE O/P EST MOD 30 MIN: CPT | Mod: PBBFAC | Performed by: SURGERY

## 2022-07-06 PROCEDURE — 99024 PR POST-OP FOLLOW-UP VISIT: ICD-10-PCS | Mod: POP,,, | Performed by: SURGERY

## 2022-07-06 PROCEDURE — 99999 PR PBB SHADOW E&M-EST. PATIENT-LVL IV: ICD-10-PCS | Mod: PBBFAC,,, | Performed by: SURGERY

## 2022-07-06 PROCEDURE — 99024 POSTOP FOLLOW-UP VISIT: CPT | Mod: POP,,, | Performed by: SURGERY

## 2022-07-06 PROCEDURE — 99999 PR PBB SHADOW E&M-EST. PATIENT-LVL IV: CPT | Mod: PBBFAC,,, | Performed by: SURGERY

## 2022-07-06 NOTE — PROGRESS NOTES
GENERAL SURGERY CLINIC NOTE    HPI:  Loni Heard is a 76 y.o. female who presents to clinic for follow up on lipoma excision on 6/20.  She is doing well overall. She denies pain, fever, purulence or discomfort. She is not taking pain medication.     ROS:   Review of Systems   Constitutional: Negative.    HENT: Negative.    Eyes: Negative.    Respiratory: Negative.    Cardiovascular: Negative.    Gastrointestinal: Negative.    Genitourinary: Negative.    Musculoskeletal: Negative.    Skin: Negative.    Neurological: Negative.    Psychiatric/Behavioral: Negative.    All other systems reviewed and are negative.       Past Medical History:   Diagnosis Date    Allergy     hx rhinitis     Asthma     childhood asthma     Atrophic gastritis     B12 deficiency     Cancer     left breast invasive ductal carcinoma     Cataract     Depression     Diabetes mellitus type II     Hyperlipidemia     Hypertension     Mild vitamin D deficiency     Pulmonary nodule     micronodule 8/2017        Past Surgical History:   Procedure Laterality Date    BREAST SURGERY Left 07/2018    left breast lumpectomy     eyelid cyst removed      foreign body removed upper airway       left ankle fracture and repair/orif       neuroma removed      right foot     TONSILLECTOMY      UPPER GASTROINTESTINAL ENDOSCOPY         Current Outpatient Medications on File Prior to Visit   Medication Sig Dispense Refill    amLODIPine (NORVASC) 5 MG tablet Take 1 tablet (5 mg total) by mouth once daily. 90 tablet 3    ASCORBATE CALCIUM, VITAMIN C, ORAL Take by mouth.      B complex-vitamin C-folic acid (JOE-NAYLA/NEPHRO-NAYLA) 0.8 mg Tab       blood sugar diagnostic Strp Use for blood sugar testing once daily -once touch ultra test strips 50 each 4    blood-glucose meter Misc Use for blood sugar testing as instructed once daily - product selection permitted 1 each 0    cholecalciferol, vitamin D3, (VITAMIN D3) 2,000 unit Tab Take 2  "tablets (4,000 Units total) by mouth once daily.      coenzyme Q10 10 mg capsule Take 10 mg by mouth.      cyanocobalamin 1,000 mcg/mL injection 1000 mcg IM Twice monthly 9 mL 11    estradioL (ESTRACE) 0.01 % (0.1 mg/gram) vaginal cream       ezetimibe (ZETIA) 10 mg tablet TAKE 1 TABLET(10 MG) BY MOUTH EVERY DAY 90 tablet 3    lancets Misc Use for blood sugar testing once daily - one touch delica lanets 30 g 50 each 3    letrozole (FEMARA) 2.5 mg Tab Take 2.5 mg by mouth.      magnesium 250 mg Tab Take by mouth.      SITagliptin (JANUVIA) 100 MG Tab TAKE 1 TABLET(100 MG) BY MOUTH EVERY DAY 90 tablet 3    syringe with needle, safety (BD INTEGRA SYRINGE) 3 mL 25 gauge x 1" Syrg for use with b 2 injection 100 Syringe 6    vit C/E/Zn/coppr/lutein/zeaxan (PRESERVISION AREDS-2 ORAL)       vitamin D3-vitamin K2, MK4, (K2 PLUS D3) 1,000-100 unit-mcg Tab       VITAMIN K2 ORAL Take by mouth.       Current Facility-Administered Medications on File Prior to Visit   Medication Dose Route Frequency Provider Last Rate Last Admin    cyanocobalamin injection 1,000 mcg  1,000 mcg Intramuscular Q14 Days Mariaelena Mallory MD           Social History     Socioeconomic History    Marital status: Single   Tobacco Use    Smoking status: Never Smoker    Smokeless tobacco: Never Used   Substance and Sexual Activity    Alcohol use: No     Comment: rarely    Drug use: No    Sexual activity: Not Currently     Social Determinants of Health     Financial Resource Strain: Low Risk     Difficulty of Paying Living Expenses: Not hard at all   Food Insecurity: No Food Insecurity    Worried About Running Out of Food in the Last Year: Never true    Ran Out of Food in the Last Year: Never true   Transportation Needs: No Transportation Needs    Lack of Transportation (Medical): No    Lack of Transportation (Non-Medical): No   Physical Activity: Insufficiently Active    Days of Exercise per Week: 2 days    Minutes of " Exercise per Session: 40 min   Stress: No Stress Concern Present    Feeling of Stress : Only a little   Social Connections: Unknown    Frequency of Communication with Friends and Family: More than three times a week    Frequency of Social Gatherings with Friends and Family: Twice a week    Active Member of Clubs or Organizations: Yes    Attends Club or Organization Meetings: More than 4 times per year    Marital Status: Never    Housing Stability: Low Risk     Unable to Pay for Housing in the Last Year: No    Number of Places Lived in the Last Year: 1    Unstable Housing in the Last Year: No       Review of patient's allergies indicates:   Allergen Reactions    Codeine Anaphylaxis     Other reaction(s): Anaphylaxis    Penicillins      Other reaction(s): dizzy     Niacin preparations Other (See Comments)     Flushing/lips swelling          PHYSICAL EXAM:  Vitals:    07/06/22 1137   BP: (!) 146/73   Pulse: 65       Physical Exam  Vitals reviewed.   Constitutional:       Appearance: Normal appearance.   HENT:      Head: Normocephalic.      Nose: Nose normal.      Mouth/Throat:      Mouth: Mucous membranes are moist.      Pharynx: Oropharynx is clear.   Cardiovascular:      Rate and Rhythm: Normal rate and regular rhythm.      Pulses: Normal pulses.      Heart sounds: Normal heart sounds.   Pulmonary:      Effort: Pulmonary effort is normal.      Breath sounds: Normal breath sounds.   Abdominal:      General: Abdomen is flat.      Palpations: Abdomen is soft.      Comments: 1 incision CDI  Glue intact    Musculoskeletal:         General: Normal range of motion.      Cervical back: Normal range of motion.   Skin:     General: Skin is warm and dry.   Neurological:      Mental Status: She is alert.   Psychiatric:         Behavior: Behavior normal.         Thought Content: Thought content normal.         Judgment: Judgment normal.       Pertinent Pathology:  RELIAPATH DIAGNOSIS:   SOFT TISSUE OF LEFT  ABDOMEN, EXCISION:   -  Mature adipose tissue, consistent with lipoma.   -  Negative for malignancy.   Philly De Luna M.D.   Report attached.   Performing site:   [facility]   [address] [place] [address]     ASSESSMENT/PLAN:  Loni Heard is a 76 y.o. female s/p lipoma excision on 6/20.    She is doing well overall. We will see Ms. Heard as needed.     Myron Perez MD  [facility] PGY-I  Pager: [phone]

## 2022-07-15 ENCOUNTER — PATIENT MESSAGE (OUTPATIENT)
Dept: INTERNAL MEDICINE | Facility: CLINIC | Age: 77
End: 2022-07-15
Payer: MEDICARE

## 2022-07-22 ENCOUNTER — TELEPHONE (OUTPATIENT)
Dept: INTERNAL MEDICINE | Facility: CLINIC | Age: 77
End: 2022-07-22
Payer: COMMERCIAL

## 2022-07-23 NOTE — TELEPHONE ENCOUNTER
Copy of immunization record was printed and pt agreed to pick it up on Monday 07/25 at the Registration desk.

## 2022-08-04 ENCOUNTER — PATIENT MESSAGE (OUTPATIENT)
Dept: SURGERY | Facility: CLINIC | Age: 77
End: 2022-08-04
Payer: COMMERCIAL

## 2022-08-12 ENCOUNTER — OFFICE VISIT (OUTPATIENT)
Dept: SURGERY | Facility: CLINIC | Age: 77
End: 2022-08-12
Payer: MEDICARE

## 2022-08-12 VITALS
DIASTOLIC BLOOD PRESSURE: 78 MMHG | HEIGHT: 64 IN | HEART RATE: 75 BPM | WEIGHT: 173.94 LBS | BODY MASS INDEX: 29.7 KG/M2 | SYSTOLIC BLOOD PRESSURE: 158 MMHG

## 2022-08-12 DIAGNOSIS — Z09 POSTOP CHECK: Primary | ICD-10-CM

## 2022-08-12 PROCEDURE — 99024 PR POST-OP FOLLOW-UP VISIT: ICD-10-PCS | Mod: POP,,, | Performed by: SURGERY

## 2022-08-12 PROCEDURE — 99024 POSTOP FOLLOW-UP VISIT: CPT | Mod: POP,,, | Performed by: SURGERY

## 2022-08-12 PROCEDURE — 99213 OFFICE O/P EST LOW 20 MIN: CPT | Mod: PBBFAC | Performed by: SURGERY

## 2022-08-12 PROCEDURE — 99999 PR PBB SHADOW E&M-EST. PATIENT-LVL III: CPT | Mod: PBBFAC,,, | Performed by: SURGERY

## 2022-08-12 PROCEDURE — 99999 PR PBB SHADOW E&M-EST. PATIENT-LVL III: ICD-10-PCS | Mod: PBBFAC,,, | Performed by: SURGERY

## 2022-08-12 NOTE — PROGRESS NOTES
GENERAL SURGERY CLINIC NOTE    CC:  Postop follow up 3    HPI:  Loni Heard is a 76 y.o. female s/p abdominal lipoma excision on 6/20. She is here today with complaints of burning pain around excision site that comes about randomly and at high altitudes. This pain started a few weeks ago when she was in hiking in [place]. She has not been taking pain medication for it. Denies nausea, vomiting, fever, changes in bowel movements.     ROS:   Review of Systems   Constitutional: Negative.    HENT: Negative.    Eyes: Negative.    Respiratory: Negative.    Cardiovascular: Negative.    Gastrointestinal: Positive for abdominal pain.   Genitourinary: Negative.    Musculoskeletal: Negative.    Skin: Negative.    Neurological: Negative.    Endo/Heme/Allergies: Negative.    Psychiatric/Behavioral: Negative.         Past Medical History:   Diagnosis Date    Allergy     hx rhinitis     Asthma     childhood asthma     Atrophic gastritis     B12 deficiency     Cancer     left breast invasive ductal carcinoma     Cataract     Depression     Diabetes mellitus type II     Hyperlipidemia     Hypertension     Mild vitamin D deficiency     Pulmonary nodule     micronodule 8/2017        Past Surgical History:   Procedure Laterality Date    BREAST SURGERY Left 07/2018    left breast lumpectomy     eyelid cyst removed      foreign body removed upper airway       left ankle fracture and repair/orif       neuroma removed      right foot     TONSILLECTOMY      UPPER GASTROINTESTINAL ENDOSCOPY         Current Outpatient Medications on File Prior to Visit   Medication Sig Dispense Refill    amLODIPine (NORVASC) 5 MG tablet Take 1 tablet (5 mg total) by mouth once daily. 90 tablet 3    ASCORBATE CALCIUM, VITAMIN C, ORAL Take by mouth.      B complex-vitamin C-folic acid (JOE-NAYLA/NEPHRO-NAYLA) 0.8 mg Tab       blood sugar diagnostic Strp Use for blood sugar testing once daily -once touch ultra test strips 50  "each 4    blood-glucose meter Misc Use for blood sugar testing as instructed once daily - product selection permitted 1 each 0    cholecalciferol, vitamin D3, (VITAMIN D3) 2,000 unit Tab Take 2 tablets (4,000 Units total) by mouth once daily.      coenzyme Q10 10 mg capsule Take 10 mg by mouth.      cyanocobalamin 1,000 mcg/mL injection 1000 mcg IM Twice monthly 9 mL 11    estradioL (ESTRACE) 0.01 % (0.1 mg/gram) vaginal cream       ezetimibe (ZETIA) 10 mg tablet TAKE 1 TABLET(10 MG) BY MOUTH EVERY DAY 90 tablet 3    lancets Misc Use for blood sugar testing once daily - one touch delica lanets 30 g 50 each 3    letrozole (FEMARA) 2.5 mg Tab Take 2.5 mg by mouth.      magnesium 250 mg Tab Take by mouth.      SITagliptin (JANUVIA) 100 MG Tab TAKE 1 TABLET(100 MG) BY MOUTH EVERY DAY 90 tablet 3    syringe with needle, safety (BD INTEGRA SYRINGE) 3 mL 25 gauge x 1" Syrg for use with b 2 injection 100 Syringe 6    vit C/E/Zn/coppr/lutein/zeaxan (PRESERVISION AREDS-2 ORAL)       vitamin D3-vitamin K2, MK4, (K2 PLUS D3) 1,000-100 unit-mcg Tab       VITAMIN K2 ORAL Take by mouth.       Current Facility-Administered Medications on File Prior to Visit   Medication Dose Route Frequency Provider Last Rate Last Admin    cyanocobalamin injection 1,000 mcg  1,000 mcg Intramuscular Q14 Days Mariaelena Mallory MD           Social History     Socioeconomic History    Marital status: Single   Tobacco Use    Smoking status: Never Smoker    Smokeless tobacco: Never Used   Substance and Sexual Activity    Alcohol use: No     Comment: rarely    Drug use: No    Sexual activity: Not Currently     Social Determinants of Health     Financial Resource Strain: Low Risk     Difficulty of Paying Living Expenses: Not hard at all   Food Insecurity: No Food Insecurity    Worried About Running Out of Food in the Last Year: Never true    Ran Out of Food in the Last Year: Never true   Transportation Needs: No " Transportation Needs    Lack of Transportation (Medical): No    Lack of Transportation (Non-Medical): No   Physical Activity: Insufficiently Active    Days of Exercise per Week: 2 days    Minutes of Exercise per Session: 40 min   Stress: No Stress Concern Present    Feeling of Stress : Only a little   Social Connections: Unknown    Frequency of Communication with Friends and Family: More than three times a week    Frequency of Social Gatherings with Friends and Family: Twice a week    Active Member of Clubs or Organizations: Yes    Attends Club or Organization Meetings: More than 4 times per year    Marital Status: Never    Housing Stability: Low Risk     Unable to Pay for Housing in the Last Year: No    Number of Places Lived in the Last Year: 1    Unstable Housing in the Last Year: No       Review of patient's allergies indicates:   Allergen Reactions    Codeine Anaphylaxis     Other reaction(s): Anaphylaxis    Penicillins      Other reaction(s): dizzy     Niacin preparations Other (See Comments)     Flushing/lips swelling          PHYSICAL EXAM:  Vitals:    08/12/22 0953   BP: (!) 158/78   Pulse: 75       Physical Exam  Vitals and nursing note reviewed.   Constitutional:       Appearance: Normal appearance. She is obese.   HENT:      Head: Normocephalic.   Cardiovascular:      Rate and Rhythm: Normal rate and regular rhythm.      Pulses: Normal pulses.      Heart sounds: Normal heart sounds.   Pulmonary:      Effort: Pulmonary effort is normal.      Breath sounds: Normal breath sounds.   Abdominal:      General: Abdomen is flat. Bowel sounds are normal. There is no distension.      Palpations: Abdomen is soft.      Tenderness: There is no abdominal tenderness.      Hernia: No hernia is present.      Comments: Lipoma excision healed. Some scar tissue still present. No other lipomas or masses identified on exam.   Musculoskeletal:         General: Normal range of motion.      Cervical back:  Normal range of motion.   Skin:     General: Skin is warm.   Neurological:      General: No focal deficit present.      Mental Status: She is alert and oriented to person, place, and time.   Psychiatric:         Mood and Affect: Mood normal.         Behavior: Behavior normal.       ASSESSMENT/PLAN:  Loni Heard is a 76 y.o. female s/p abdominal lipoma excision on 6/20 who is here with additional sudden mild pain around site.      We recommended watchful waiting due to continue healing from the lipoma excision. Recommend taking tylenol if pain persists. We recommend scheduling with our office in 6 months if pain persists or worsens or if she develops new symptoms related.    Myron Perez MD  [facility] PGY-I  Pager: [phone]      I have personally taken the history and examined this patient and agree with the resident's note as stated above.         Osmel Hendricks MD

## 2022-09-17 ENCOUNTER — LAB VISIT (OUTPATIENT)
Dept: LAB | Facility: HOSPITAL | Age: 77
End: 2022-09-17
Attending: INTERNAL MEDICINE
Payer: MEDICARE

## 2022-09-17 DIAGNOSIS — E11.8 TYPE 2 DIABETES MELLITUS WITH UNSPECIFIED COMPLICATIONS: ICD-10-CM

## 2022-09-17 LAB
ANION GAP SERPL CALC-SCNC: 8 MMOL/L (ref 8–16)
BUN SERPL-MCNC: 16 MG/DL (ref 8–23)
CALCIUM SERPL-MCNC: 10.3 MG/DL (ref 8.7–10.5)
CHLORIDE SERPL-SCNC: 103 MMOL/L (ref 95–110)
CHOLEST SERPL-MCNC: 197 MG/DL (ref 120–199)
CHOLEST/HDLC SERPL: 3.9 {RATIO} (ref 2–5)
CO2 SERPL-SCNC: 28 MMOL/L (ref 23–29)
CREAT SERPL-MCNC: 0.9 MG/DL (ref 0.5–1.4)
EST. GFR  (NO RACE VARIABLE): >60 ML/MIN/1.73 M^2
ESTIMATED AVG GLUCOSE: 146 MG/DL (ref 68–131)
GLUCOSE SERPL-MCNC: 155 MG/DL (ref 70–110)
HBA1C MFR BLD: 6.7 % (ref 4–5.6)
HDLC SERPL-MCNC: 50 MG/DL (ref 40–75)
HDLC SERPL: 25.4 % (ref 20–50)
LDLC SERPL CALC-MCNC: 115.2 MG/DL (ref 63–159)
NONHDLC SERPL-MCNC: 147 MG/DL
POTASSIUM SERPL-SCNC: 5.1 MMOL/L (ref 3.5–5.1)
SODIUM SERPL-SCNC: 139 MMOL/L (ref 136–145)
TRIGL SERPL-MCNC: 159 MG/DL (ref 30–150)

## 2022-09-17 PROCEDURE — 83036 HEMOGLOBIN GLYCOSYLATED A1C: CPT | Performed by: INTERNAL MEDICINE

## 2022-09-17 PROCEDURE — 36415 COLL VENOUS BLD VENIPUNCTURE: CPT | Performed by: INTERNAL MEDICINE

## 2022-09-17 PROCEDURE — 80048 BASIC METABOLIC PNL TOTAL CA: CPT | Performed by: INTERNAL MEDICINE

## 2022-09-17 PROCEDURE — 80061 LIPID PANEL: CPT | Performed by: INTERNAL MEDICINE

## 2022-09-20 ENCOUNTER — OFFICE VISIT (OUTPATIENT)
Dept: INTERNAL MEDICINE | Facility: CLINIC | Age: 77
End: 2022-09-20
Payer: MEDICARE

## 2022-09-20 ENCOUNTER — IMMUNIZATION (OUTPATIENT)
Dept: INTERNAL MEDICINE | Facility: CLINIC | Age: 77
End: 2022-09-20
Payer: MEDICARE

## 2022-09-20 VITALS
WEIGHT: 174.38 LBS | BODY MASS INDEX: 29.77 KG/M2 | SYSTOLIC BLOOD PRESSURE: 138 MMHG | HEART RATE: 63 BPM | HEIGHT: 64 IN | OXYGEN SATURATION: 99 % | DIASTOLIC BLOOD PRESSURE: 78 MMHG

## 2022-09-20 DIAGNOSIS — I10 PRIMARY HYPERTENSION: Primary | ICD-10-CM

## 2022-09-20 DIAGNOSIS — E53.8 B12 DEFICIENCY: ICD-10-CM

## 2022-09-20 DIAGNOSIS — M79.605 PAIN OF LEFT LOWER EXTREMITY: ICD-10-CM

## 2022-09-20 DIAGNOSIS — E11.8 TYPE 2 DIABETES MELLITUS WITH UNSPECIFIED COMPLICATIONS: ICD-10-CM

## 2022-09-20 DIAGNOSIS — Z13.6 ENCOUNTER FOR SCREENING FOR CARDIOVASCULAR DISORDERS: ICD-10-CM

## 2022-09-20 DIAGNOSIS — Z85.3 HX: BREAST CANCER: ICD-10-CM

## 2022-09-20 DIAGNOSIS — Z23 ENCOUNTER FOR ADMINISTRATION OF VACCINE: Primary | ICD-10-CM

## 2022-09-20 PROCEDURE — 99214 PR OFFICE/OUTPT VISIT, EST, LEVL IV, 30-39 MIN: ICD-10-PCS | Mod: S$PBB,,, | Performed by: INTERNAL MEDICINE

## 2022-09-20 PROCEDURE — 99999 PR PBB SHADOW E&M-EST. PATIENT-LVL V: CPT | Mod: PBBFAC,,, | Performed by: INTERNAL MEDICINE

## 2022-09-20 PROCEDURE — 99999 PR PBB SHADOW E&M-EST. PATIENT-LVL V: ICD-10-PCS | Mod: PBBFAC,,, | Performed by: INTERNAL MEDICINE

## 2022-09-20 PROCEDURE — 99215 OFFICE O/P EST HI 40 MIN: CPT | Mod: PBBFAC | Performed by: INTERNAL MEDICINE

## 2022-09-20 PROCEDURE — 99214 OFFICE O/P EST MOD 30 MIN: CPT | Mod: S$PBB,,, | Performed by: INTERNAL MEDICINE

## 2022-09-20 PROCEDURE — G0008 ADMIN INFLUENZA VIRUS VAC: HCPCS | Mod: PBBFAC

## 2022-09-20 NOTE — PROGRESS NOTES
After obtaining consent, and per orders of Dr. Mariaelena Ricks, injection of FluAD given in the left deltoid by Isabela Collins. Patient instructed to remain in clinic for 15 minutes afterwards, and to report any adverse reaction to staff immediately. Pt tolerated vaccine well.

## 2022-09-20 NOTE — PROGRESS NOTES
"Subjective:       Patient ID: Loni Heard is a 76 y.o. female.    Chief Complaint: Follow-up  This is a 76-year-old who presents today for follow-up she reports that she has been doing well she continues to stay active try to do exercise a little slower than she used to be but also working on eating better and healthier her weight is down a few lb.  She is also continuing to work on a project with school so staying quite busy.  She reports in general doing well she does not take her amlodipine very consistently and reports her pressure was running kind of high recently as some of her other doctors appointments so she did start taking it at bedtime and then felt it made her little dizzy in the morning although she did not check her pressure at that time to see with the number was she reports that she will be on letrozole for another year and she did have recent appoint with her oncologist and up-to-date on her mammogram.  She would like to get her flu shot today.  She did have a lipoma removed from her abdomen since last visit    Follow-up    Review of Systems   Cardiovascular:  Negative for leg swelling.   Musculoskeletal:         Varicsose spider vein bothers her at times      Objective:    Blood pressure 138/78, pulse 63, height 5' 4" (1.626 m), weight 79.1 kg (174 lb 6.1 oz), SpO2 99 %.   Physical Exam  Constitutional:       General: She is not in acute distress.  HENT:      Head: Normocephalic.      Mouth/Throat:      Pharynx: Oropharynx is clear.   Eyes:      General: No scleral icterus.  Cardiovascular:      Rate and Rhythm: Normal rate and regular rhythm.      Heart sounds: Normal heart sounds. No murmur heard.    No friction rub. No gallop.      Comments: Surgical scar left breast  Radiation changes   Pulmonary:      Effort: Pulmonary effort is normal. No respiratory distress.      Breath sounds: Normal breath sounds.   Abdominal:      General: Bowel sounds are normal.      Palpations: Abdomen is " soft. There is no mass.      Tenderness: There is no abdominal tenderness.      Comments: Surgical scar abdomen    Musculoskeletal:      Cervical back: Neck supple.      Right foot: No deformity.      Left foot: No deformity.      Comments: Spider veins    Feet:      Right foot:      Protective Sensation: 4 sites tested.  4 sites sensed.      Left foot:      Protective Sensation: 4 sites tested.  4 sites sensed.   Skin:     Findings: No erythema.   Neurological:      Mental Status: She is alert.   Psychiatric:         Mood and Affect: Mood normal.       Assessment:       1. Primary hypertension    2. Type 2 diabetes mellitus with unspecified complications    3. B12 deficiency    4. HX: breast cancer    5. Pain of left lower extremity    6. Encounter for screening for cardiovascular disorders          Plan:       Loni was seen today for follow-up.    Diagnoses and all orders for this visit:    Primary hypertension  We discussed she may need to be more consistent about taking her amlodipine we discussed she can try taking half tablet at bedtime and see how her numbers are if they still are elevated she could take half in the evening half in the morning of her amlodipine 5 mg for improved blood pressure control continue exercise and dietary measures  -     CBC Auto Differential; Future  -     Comprehensive Metabolic Panel; Future  -     Hemoglobin A1C; Future    Type 2 diabetes mellitus with unspecified complications  History of improved will continue current regimen  -     CBC Auto Differential; Future  -     Comprehensive Metabolic Panel; Future  -     Hemoglobin A1C; Future    B12 deficiency  She remains on B12    HX: breast cancer  Continues to follow with hematologist oncologist    Pain of left lower extremity  Spider veins we discussed supportive stockings  -     US Lower Extremity Veins Left; Future    Encounter for screening for cardiovascular disorders  She would like to schedule a CT cardiac score order  placed as requested  -     CT Cardiac Scoring; Future    Follow up 4 months with lab  Flu shot recommended

## 2022-09-21 ENCOUNTER — PATIENT MESSAGE (OUTPATIENT)
Dept: INTERNAL MEDICINE | Facility: CLINIC | Age: 77
End: 2022-09-21
Payer: COMMERCIAL

## 2022-09-21 DIAGNOSIS — I10 ESSENTIAL HYPERTENSION: Primary | ICD-10-CM

## 2022-09-22 ENCOUNTER — HOSPITAL ENCOUNTER (OUTPATIENT)
Dept: RADIOLOGY | Facility: HOSPITAL | Age: 77
Discharge: HOME | End: 2022-09-22
Attending: INTERNAL MEDICINE
Payer: MEDICARE

## 2022-09-22 DIAGNOSIS — M79.605 PAIN OF LEFT LOWER EXTREMITY: ICD-10-CM

## 2022-09-22 PROCEDURE — 93971 EXTREMITY STUDY: CPT | Mod: 26,LT,, | Performed by: RADIOLOGY

## 2022-09-22 PROCEDURE — 93971 EXTREMITY STUDY: CPT | Mod: TC,LT

## 2022-09-22 PROCEDURE — 93971 US LOWER EXTREMITY VEINS LEFT: ICD-10-PCS | Mod: 26,LT,, | Performed by: RADIOLOGY

## 2022-09-23 ENCOUNTER — HOSPITAL ENCOUNTER (OUTPATIENT)
Dept: RADIOLOGY | Facility: HOSPITAL | Age: 77
Discharge: HOME | End: 2022-09-23
Attending: INTERNAL MEDICINE
Payer: MEDICARE

## 2022-09-23 DIAGNOSIS — Z13.6 ENCOUNTER FOR SCREENING FOR CARDIOVASCULAR DISORDERS: ICD-10-CM

## 2022-09-23 PROCEDURE — 75571 CT CALCIUM SCORING CARDIAC: ICD-10-PCS | Mod: 26,,, | Performed by: RADIOLOGY

## 2022-09-23 PROCEDURE — 75571 CT HRT W/O DYE W/CA TEST: CPT | Mod: TC

## 2022-09-23 PROCEDURE — 75571 CT HRT W/O DYE W/CA TEST: CPT | Mod: 26,,, | Performed by: RADIOLOGY

## 2022-09-25 ENCOUNTER — PATIENT MESSAGE (OUTPATIENT)
Dept: INTERNAL MEDICINE | Facility: CLINIC | Age: 77
End: 2022-09-25
Payer: COMMERCIAL

## 2022-09-26 ENCOUNTER — PATIENT MESSAGE (OUTPATIENT)
Dept: INTERNAL MEDICINE | Facility: CLINIC | Age: 77
End: 2022-09-26
Payer: COMMERCIAL

## 2022-09-26 DIAGNOSIS — Z01.00 EYE EXAM, ROUTINE: ICD-10-CM

## 2022-09-26 DIAGNOSIS — I10 HYPERTENSION, UNSPECIFIED TYPE: ICD-10-CM

## 2022-09-26 DIAGNOSIS — E11.9 TYPE 2 DIABETES MELLITUS WITHOUT COMPLICATION, WITHOUT LONG-TERM CURRENT USE OF INSULIN: Primary | ICD-10-CM

## 2022-09-26 DIAGNOSIS — Z82.49 FAMILY HISTORY OF CORONARY ARTERY DISEASE: ICD-10-CM

## 2022-09-26 DIAGNOSIS — R93.1 ABNORMAL SCREENING CARDIAC CT: Primary | ICD-10-CM

## 2022-09-27 ENCOUNTER — TELEPHONE (OUTPATIENT)
Dept: CARDIOLOGY | Facility: CLINIC | Age: 77
End: 2022-09-27
Payer: COMMERCIAL

## 2022-09-27 DIAGNOSIS — R00.2 PALPITATIONS: Primary | ICD-10-CM

## 2022-09-28 ENCOUNTER — OFFICE VISIT (OUTPATIENT)
Dept: CARDIOLOGY | Facility: CLINIC | Age: 77
End: 2022-09-28
Payer: MEDICARE

## 2022-09-28 VITALS
HEIGHT: 64 IN | SYSTOLIC BLOOD PRESSURE: 136 MMHG | WEIGHT: 172.19 LBS | DIASTOLIC BLOOD PRESSURE: 76 MMHG | BODY MASS INDEX: 29.4 KG/M2 | HEART RATE: 63 BPM

## 2022-09-28 DIAGNOSIS — R93.1 ABNORMAL SCREENING CARDIAC CT: ICD-10-CM

## 2022-09-28 DIAGNOSIS — C50.912 MALIGNANT NEOPLASM OF LEFT FEMALE BREAST, UNSPECIFIED ESTROGEN RECEPTOR STATUS, UNSPECIFIED SITE OF BREAST: ICD-10-CM

## 2022-09-28 DIAGNOSIS — Z82.49 FAMILY HISTORY OF CORONARY ARTERY DISEASE: ICD-10-CM

## 2022-09-28 DIAGNOSIS — E78.49 OTHER HYPERLIPIDEMIA: ICD-10-CM

## 2022-09-28 DIAGNOSIS — R93.1 AGATSTON CORONARY ARTERY CALCIUM SCORE BETWEEN 200 AND 399: Primary | ICD-10-CM

## 2022-09-28 DIAGNOSIS — E11.9 TYPE 2 DIABETES MELLITUS WITHOUT COMPLICATION, WITHOUT LONG-TERM CURRENT USE OF INSULIN: ICD-10-CM

## 2022-09-28 DIAGNOSIS — I10 ESSENTIAL HYPERTENSION: ICD-10-CM

## 2022-09-28 PROCEDURE — 99999 PR PBB SHADOW E&M-EST. PATIENT-LVL V: CPT | Mod: PBBFAC,,, | Performed by: INTERNAL MEDICINE

## 2022-09-28 PROCEDURE — 99215 OFFICE O/P EST HI 40 MIN: CPT | Mod: PBBFAC | Performed by: INTERNAL MEDICINE

## 2022-09-28 PROCEDURE — 99214 OFFICE O/P EST MOD 30 MIN: CPT | Mod: S$PBB,,, | Performed by: INTERNAL MEDICINE

## 2022-09-28 PROCEDURE — 99214 PR OFFICE/OUTPT VISIT, EST, LEVL IV, 30-39 MIN: ICD-10-PCS | Mod: S$PBB,,, | Performed by: INTERNAL MEDICINE

## 2022-09-28 PROCEDURE — 99999 PR PBB SHADOW E&M-EST. PATIENT-LVL V: ICD-10-PCS | Mod: PBBFAC,,, | Performed by: INTERNAL MEDICINE

## 2022-09-28 RX ORDER — ROSUVASTATIN CALCIUM 20 MG/1
20 TABLET, COATED ORAL DAILY
Qty: 90 TABLET | Refills: 3 | Status: SHIPPED | OUTPATIENT
Start: 2022-09-28 | End: 2024-01-18 | Stop reason: SDUPTHER

## 2022-09-28 NOTE — PROGRESS NOTES
Subjective:    Patient ID:  Loni Heard is a 77 y.o. female who presents for evaluation of calcium score 251    HPI  The patient is a 77 year old female was seen by Dr Mallory 22 for hypertension and diabetes. She requested a calcium score as part of a CV evaluation and was found to be 251. A CAC in  was 25. She denies chest pain or ANGUIANO. She has been reluctant to take statins but is on zetia. He has exercises regularly in past and plans to resume. Her father   of a stroke and is a non smoker. EKG 19 was normal  Lab Results   Component Value Date     2022    K 5.1 2022     2022    CO2 28 2022    BUN 16 2022    CREATININE 0.9 2022     (H) 2022    HGBA1C 6.7 (H) 2022    AST 23 2022    ALT 23 2022    ALBUMIN 3.9 2022    PROT 7.0 2022    BILITOT 0.5 2022    WBC 5.75 2022    HGB 13.6 2022    HCT 41.6 2022    MCV 91 2022     2022    TSH 2.839 2019         Lab Results   Component Value Date    CHOL 197 2022    HDL 50 2022    TRIG 159 (H) 2022       Lab Results   Component Value Date    LDLCALC 115.2 2022       Past Medical History:   Diagnosis Date    Allergy     hx rhinitis     Asthma     childhood asthma     Atrophic gastritis     B12 deficiency     Cancer     left breast invasive ductal carcinoma     Cataract     Depression     Diabetes mellitus type II     Hyperlipidemia     Hypertension     Mild vitamin D deficiency     Pulmonary nodule     micronodule 2017        Current Outpatient Medications:     amLODIPine (NORVASC) 5 MG tablet, Take 1 tablet (5 mg total) by mouth once daily., Disp: 90 tablet, Rfl: 3    blood sugar diagnostic Strp, Use for blood sugar testing once daily -once touch ultra test strips, Disp: 50 each, Rfl: 4    blood-glucose meter Misc, Use for blood sugar testing as instructed once daily - product selection  "permitted, Disp: 1 each, Rfl: 0    cholecalciferol, vitamin D3, (VITAMIN D3) 2,000 unit Tab, Take 2 tablets (4,000 Units total) by mouth once daily., Disp: , Rfl:     coenzyme Q10 10 mg capsule, Take 10 mg by mouth., Disp: , Rfl:     cyanocobalamin 1,000 mcg/mL injection, 1000 mcg IM Twice monthly, Disp: 9 mL, Rfl: 11    estradioL (ESTRACE) 0.01 % (0.1 mg/gram) vaginal cream, , Disp: , Rfl:     lancets Misc, Use for blood sugar testing once daily - one touch delica lanets 30 g, Disp: 50 each, Rfl: 3    letrozole (FEMARA) 2.5 mg Tab, Take 2.5 mg by mouth., Disp: , Rfl:     magnesium 250 mg Tab, Take by mouth., Disp: , Rfl:     SITagliptin (JANUVIA) 100 MG Tab, TAKE 1 TABLET(100 MG) BY MOUTH EVERY DAY, Disp: 90 tablet, Rfl: 3    syringe with needle, safety (BD INTEGRA SYRINGE) 3 mL 25 gauge x 1" Syrg, for use with b 2 injection, Disp: 100 Syringe, Rfl: 6    vit C/E/Zn/coppr/lutein/zeaxan (PRESERVISION AREDS-2 ORAL), , Disp: , Rfl:     VITAMIN K2 ORAL, Take by mouth., Disp: , Rfl:     B complex-vitamin C-folic acid (JOE-NAYLA/NEPHRO-NAYLA) 0.8 mg Tab, , Disp: , Rfl:     rosuvastatin (CRESTOR) 20 MG tablet, Take 1 tablet (20 mg total) by mouth once daily., Disp: 90 tablet, Rfl: 3    vitamin D3-vitamin K2, MK4, (K2 PLUS D3) 1,000-100 unit-mcg Tab, , Disp: , Rfl:     Current Facility-Administered Medications:     cyanocobalamin injection 1,000 mcg, 1,000 mcg, Intramuscular, Q14 Days, Mariaelena Mallory MD          Review of Systems   Constitutional: Negative for decreased appetite, diaphoresis, fever, malaise/fatigue, weight gain and weight loss.   HENT:  Negative for congestion, ear discharge, ear pain and nosebleeds.    Eyes:  Negative for blurred vision, double vision and visual disturbance.   Cardiovascular:  Negative for chest pain, claudication, cyanosis, dyspnea on exertion, irregular heartbeat, leg swelling, near-syncope, orthopnea, palpitations, paroxysmal nocturnal dyspnea and syncope.   Respiratory:  " "Negative for cough, hemoptysis, shortness of breath, sleep disturbances due to breathing, snoring, sputum production and wheezing.    Endocrine: Negative for polydipsia, polyphagia and polyuria.   Hematologic/Lymphatic: Negative for adenopathy and bleeding problem. Does not bruise/bleed easily.   Skin:  Negative for color change, nail changes, poor wound healing and rash.   Musculoskeletal:  Negative for muscle cramps and muscle weakness.   Gastrointestinal:  Negative for abdominal pain, anorexia, change in bowel habit, hematochezia, nausea and vomiting.   Genitourinary:  Negative for dysuria, frequency and hematuria.   Neurological:  Negative for brief paralysis, difficulty with concentration, excessive daytime sleepiness, dizziness, focal weakness, headaches, light-headedness, seizures, vertigo and weakness.   Psychiatric/Behavioral:  Negative for altered mental status and depression.    Allergic/Immunologic: Negative for persistent infections.      Objective:/76 (BP Location: Right arm, Patient Position: Sitting, BP Method: Medium (Automatic))   Pulse 63   Ht 5' 4" (1.626 m)   Wt 78.1 kg (172 lb 2.9 oz)   BMI 29.55 kg/m²             Physical Exam  Constitutional:       Appearance: She is well-developed. She is obese.   HENT:      Head: Normocephalic.      Right Ear: External ear normal.      Left Ear: External ear normal.      Nose: Nose normal.   Eyes:      General: No scleral icterus.     Conjunctiva/sclera: Conjunctivae normal.      Pupils: Pupils are equal, round, and reactive to light.   Neck:      Thyroid: No thyromegaly.      Vascular: No JVD.      Trachea: No tracheal deviation.   Cardiovascular:      Rate and Rhythm: Normal rate and regular rhythm.      Pulses: Intact distal pulses.           Carotid pulses are 2+ on the right side and 2+ on the left side.       Dorsalis pedis pulses are 2+ on the right side and 2+ on the left side.      Heart sounds: Normal heart sounds. No murmur heard.    No " friction rub. No gallop.   Pulmonary:      Effort: Pulmonary effort is normal. No respiratory distress.      Breath sounds: Normal breath sounds. No wheezing or rales.   Chest:      Chest wall: No tenderness.   Abdominal:      General: Bowel sounds are normal. There is no distension.      Palpations: Abdomen is soft.      Tenderness: There is no abdominal tenderness. There is no guarding.   Musculoskeletal:         General: No tenderness. Normal range of motion.      Cervical back: Normal range of motion.   Lymphadenopathy:      Comments: Palpation of lymph nodes of neck and groin normal   Skin:     General: Skin is warm and dry.      Coloration: Skin is not pale.      Findings: No erythema or rash.      Comments: Palpation of skin normal   Neurological:      Mental Status: She is alert and oriented to person, place, and time.      Cranial Nerves: No cranial nerve deficit.      Motor: No abnormal muscle tone.      Coordination: Coordination normal.   Psychiatric:         Behavior: Behavior normal.         Thought Content: Thought content normal.         Judgment: Judgment normal.       Assessment:       1. Agatston coronary artery calcium score between 200 and 399    2. Essential hypertension    3. Other hyperlipidemia    4. Malignant neoplasm of left female breast, unspecified estrogen receptor status, unspecified site of breast    5. Type 2 diabetes mellitus without complication, without long-term current use of insulin    6. Abnormal screening cardiac CT    7. Family history of coronary artery disease         Plan:       Loni was seen today for establish care and results.    Diagnoses and all orders for this visit:    Agatston coronary artery calcium score between 200 and 399  -     rosuvastatin (CRESTOR) 20 MG tablet; Take 1 tablet (20 mg total) by mouth once daily.    Essential hypertension    Other hyperlipidemia  -     rosuvastatin (CRESTOR) 20 MG tablet; Take 1 tablet (20 mg total) by mouth once  daily.  -     Lipid Panel; Future; Expected date: 11/09/2022  -     Lipid Panel; Future; Expected date: 09/28/2023    Malignant neoplasm of left female breast, unspecified estrogen receptor status, unspecified site of breast    Type 2 diabetes mellitus without complication, without long-term current use of insulin    Abnormal screening cardiac CT  -     Ambulatory referral/consult to Cardiology    Family history of coronary artery disease  -     Ambulatory referral/consult to Cardiology

## 2022-10-10 ENCOUNTER — DOCUMENTATION ONLY (OUTPATIENT)
Dept: PHARMACY | Facility: CLINIC | Age: 77
End: 2022-10-10
Payer: COMMERCIAL

## 2022-10-10 NOTE — PHARMACY MED REC
Administered Cyanocobalamin (1000 mcg/mL - 1 mL vial) shot on 10/10/22 at 10:50 AM in the LEFT Deltoid.    NDC: 4206-3947-54  Lot: [id number]  Exp: 09/2023    Administered by: Peyton Burden Pharm Intern    Pharmacist: Shadi Long. D

## 2022-10-11 NOTE — TELEPHONE ENCOUNTER
Refill Decision Note   Loni Heard  is requesting a refill authorization.  Brief Assessment and Rationale for Refill:  Approve     Medication Therapy Plan:       Medication Reconciliation Completed: No   Comments:     No Care Gaps recommended.     Note composed:4:22 PM 10/11/2022

## 2022-10-11 NOTE — TELEPHONE ENCOUNTER
No new care gaps identified.  [facility] Embedded Care Gaps. Reference number: [id number]. 10/11/2022   10:47:54 AM SHAYLAT

## 2022-10-24 ENCOUNTER — OFFICE VISIT (OUTPATIENT)
Dept: OPHTHALMOLOGY | Facility: CLINIC | Age: 77
End: 2022-10-24
Payer: MEDICARE

## 2022-10-24 DIAGNOSIS — H25.13 NS (NUCLEAR SCLEROSIS), BILATERAL: Primary | ICD-10-CM

## 2022-10-24 DIAGNOSIS — H52.7 REFRACTIVE ERROR: ICD-10-CM

## 2022-10-24 PROCEDURE — 99999 PR PBB SHADOW E&M-EST. PATIENT-LVL III: CPT | Mod: PBBFAC,,, | Performed by: OPHTHALMOLOGY

## 2022-10-24 PROCEDURE — 92012 PR EYE EXAM, EST PATIENT,INTERMED: ICD-10-PCS | Mod: S$PBB,,, | Performed by: OPHTHALMOLOGY

## 2022-10-24 PROCEDURE — 99213 OFFICE O/P EST LOW 20 MIN: CPT | Mod: PBBFAC,PO | Performed by: OPHTHALMOLOGY

## 2022-10-24 PROCEDURE — 92012 INTRM OPH EXAM EST PATIENT: CPT | Mod: S$PBB,,, | Performed by: OPHTHALMOLOGY

## 2022-10-24 PROCEDURE — 99999 PR PBB SHADOW E&M-EST. PATIENT-LVL III: ICD-10-PCS | Mod: PBBFAC,,, | Performed by: OPHTHALMOLOGY

## 2022-10-24 NOTE — PROGRESS NOTES
Subjective:       Patient ID: Loni Heard is a 77 y.o. female.    Chief Complaint: Eye Exam    HPI    DLS: 11/30/21 Dr Abimbola WOLFFSL: 150 x 4 days ago    Pt here for yearly eye exam.  Pt is diabetic.  Pt states she gets a flash   OU when she wakes up in the morning.  Pt states she is going to [employer] for   some classes and she states her eyes get tired after being on the   computer.  Pt denies floaters, headaches or eye pain OU.  Pt denies   itching, tearing or burning OU.     Hemoglobin A1C       Date                     Value               Ref Range             Status                09/17/2022               6.7 (H)             4.0 - 5.6 %           Final                       05/17/2022               7.0 (H)             4.0 - 5.6 %           Final                01/18/2022               6.8 (H)             4.0 - 5.6 %         Final                  AT's PRN OU   Preservation qday     Last edited by Dyana Hankins MA on 10/24/2022  9:39 AM.             Assessment:       1. NS (nuclear sclerosis), bilateral    2. Refractive error          Plan:       Cataracts- Not visually significant.  RE-Doing well with readers.      RTC in 2 mos for DFE for DM.

## 2022-11-08 ENCOUNTER — DOCUMENTATION ONLY (OUTPATIENT)
Dept: PHARMACY | Facility: CLINIC | Age: 77
End: 2022-11-08

## 2022-11-08 NOTE — PROGRESS NOTES
Patient received IM B12 injection to the upper outer side of RIGHT deltoid on 11/8/22.          Lot Number: [id number]  Expiration Date:9/2023   By JOCELYNE JACINTO [id number]   Exp 3/2024

## 2022-11-19 ENCOUNTER — IMMUNIZATION (OUTPATIENT)
Dept: PRIMARY CARE CLINIC | Facility: CLINIC | Age: 77
End: 2022-11-19
Payer: MEDICARE

## 2022-11-19 DIAGNOSIS — Z23 NEED FOR VACCINATION: Primary | ICD-10-CM

## 2022-11-19 PROCEDURE — 0124A COVID-19, MRNA, LNP-S, BIVALENT BOOSTER, PF, 30 MCG/0.3 ML DOSE: CPT | Mod: CV19,PBBFAC | Performed by: INTERNAL MEDICINE

## 2022-11-19 PROCEDURE — 91312 COVID-19, MRNA, LNP-S, BIVALENT BOOSTER, PF, 30 MCG/0.3 ML DOSE: CPT | Mod: PBBFAC | Performed by: INTERNAL MEDICINE

## 2022-12-19 ENCOUNTER — OFFICE VISIT (OUTPATIENT)
Dept: OPHTHALMOLOGY | Facility: CLINIC | Age: 77
End: 2022-12-19
Payer: MEDICARE

## 2022-12-19 DIAGNOSIS — H52.7 REFRACTIVE ERROR: ICD-10-CM

## 2022-12-19 DIAGNOSIS — I10 ESSENTIAL HYPERTENSION: ICD-10-CM

## 2022-12-19 DIAGNOSIS — E11.9 DM TYPE 2 WITHOUT RETINOPATHY: ICD-10-CM

## 2022-12-19 DIAGNOSIS — H25.13 NS (NUCLEAR SCLEROSIS), BILATERAL: Primary | ICD-10-CM

## 2022-12-19 PROCEDURE — 99212 OFFICE O/P EST SF 10 MIN: CPT | Mod: PBBFAC,PO | Performed by: OPHTHALMOLOGY

## 2022-12-19 PROCEDURE — 99999 PR PBB SHADOW E&M-EST. PATIENT-LVL II: CPT | Mod: PBBFAC,,, | Performed by: OPHTHALMOLOGY

## 2022-12-19 PROCEDURE — 99999 PR PBB SHADOW E&M-EST. PATIENT-LVL II: ICD-10-PCS | Mod: PBBFAC,,, | Performed by: OPHTHALMOLOGY

## 2022-12-19 PROCEDURE — 92014 PR EYE EXAM, EST PATIENT,COMPREHESV: ICD-10-PCS | Mod: S$PBB,,, | Performed by: OPHTHALMOLOGY

## 2022-12-19 PROCEDURE — 92014 COMPRE OPH EXAM EST PT 1/>: CPT | Mod: S$PBB,,, | Performed by: OPHTHALMOLOGY

## 2022-12-19 NOTE — PROGRESS NOTES
"Subjective:       Patient ID: Loni Heard is a 77 y.o. female.    Chief Complaint: Diabetic Eye Exam (Patient Loni Heard (Chad) is a 77 year old female.)    HPI     Diabetic Eye Exam     Additional comments: Patient Loni Heard (Chad) is a 77 year   old female.           Comments    Pt here for DFE in continuation of 10/24/2022 diabetic eye exam. Pt states   that she sees a "white spot" on corner of eyes when she wakes up.    DLS: 10/24/2022 with Dr. Angel    Gtts: AT's PRN OU    POHx:  1. NS (nuclear sclerosis), bilateral   2. Refractive error     (+)DM  Hemoglobin A1C       Date                     Value               Ref Range             Status                09/17/2022               6.7 (H)             4.0 - 5.6 %           Final                   05/17/2022               7.0 (H)             4.0 - 5.6 %           Final                   01/18/2022               6.8 (H)             4.0 - 5.6 %           Final                    Last edited by Sofi Prasad on 12/19/2022  3:23 PM.             Assessment:       1. NS (nuclear sclerosis), bilateral    2. DM type 2 without retinopathy    3. Essential hypertension    4. Refractive error          Plan:       Cataracts- Not visually significant.  DM-No NPDR OU.  HTN-No retinopathy OU.  RE-Pt does not want MRx.      Control DM & HTN.  RTC 1 yr.      "
total assist to don socks

## 2023-01-09 ENCOUNTER — TELEPHONE (OUTPATIENT)
Dept: INTERNAL MEDICINE | Facility: CLINIC | Age: 78
End: 2023-01-09
Payer: COMMERCIAL

## 2023-01-09 NOTE — TELEPHONE ENCOUNTER
Called patient to discuss PA and to let her know that it was started thru [employer]. Case#[id number]. Informed that it will take about 72 hours before an answer. Called [employer] at [phone].

## 2023-01-09 NOTE — TELEPHONE ENCOUNTER
Pt sent supporting information  Placed on your desk   Regarding her b12 shots not being formulary  From her insurance [employer]  Requsting authorization/ approval   See supporting documents  For atrophic gastritic/ b12 deficiency unable to absorb oral tablets

## 2023-01-11 ENCOUNTER — PATIENT MESSAGE (OUTPATIENT)
Dept: INTERNAL MEDICINE | Facility: CLINIC | Age: 78
End: 2023-01-11
Payer: COMMERCIAL

## 2023-01-11 ENCOUNTER — OFFICE VISIT (OUTPATIENT)
Dept: URGENT CARE | Facility: CLINIC | Age: 78
End: 2023-01-11
Payer: MEDICARE

## 2023-01-11 VITALS
SYSTOLIC BLOOD PRESSURE: 175 MMHG | OXYGEN SATURATION: 97 % | DIASTOLIC BLOOD PRESSURE: 81 MMHG | HEART RATE: 84 BPM | HEIGHT: 64 IN | TEMPERATURE: 99 F | RESPIRATION RATE: 18 BRPM | BODY MASS INDEX: 29.37 KG/M2 | WEIGHT: 172 LBS

## 2023-01-11 DIAGNOSIS — U07.1 COVID-19 VIRUS INFECTION: Primary | ICD-10-CM

## 2023-01-11 PROCEDURE — 99214 OFFICE O/P EST MOD 30 MIN: CPT | Mod: CR,S$GLB,, | Performed by: PHYSICIAN ASSISTANT

## 2023-01-11 PROCEDURE — 99214 PR OFFICE/OUTPT VISIT, EST, LEVL IV, 30-39 MIN: ICD-10-PCS | Mod: CR,S$GLB,, | Performed by: PHYSICIAN ASSISTANT

## 2023-01-11 RX ORDER — BENZONATATE 200 MG/1
200 CAPSULE ORAL 3 TIMES DAILY PRN
Qty: 30 CAPSULE | Refills: 0 | Status: SHIPPED | OUTPATIENT
Start: 2023-01-11 | End: 2023-01-20 | Stop reason: ALTCHOICE

## 2023-01-11 NOTE — PATIENT INSTRUCTIONS
Paxlovid interactions:  - DISCONTINUE ROSUVASTATIN FOR 10 DAYS DUE TO INTERACTION WITH PAXLOVID      You have tested positive for COVID-19 today.      ISOLATION    If you tested positive and do not have symptoms, you must isolate for 5 days starting on the day of the positive test.     If you tested positive and have symptoms, you must isolate for 5 days starting on the day of the first symptoms,  not the day of the positive test.    This is the most important part: both the CDC and the [facility] emphasize that you do not test out of isolation.    After 5 days, if your symptoms have improved and you have not had fever on day 5, you can return to the community on day 6- NO TESTING REQUIRED! You must continue to wear mask on days 6-10.    In fact, we do not retest if you were positive in the last 90 days.    After your 5 days of isolation are completed, the CDC recommends strict mask use for the first 5 days that you come out of isolation.       - Rest.    - Drink plenty of fluids.  - Viral upper respiratory infections typically run their course in 10-14 days.     - Tylenol or Ibuprofen as directed as needed for fever/pain. Avoid tylenol if you have a history of liver disease. Do not take ibuprofen if you have a history of GI bleeding, kidney disease, or if you take blood thinners.     - You can take over-the-counter claritin, zyrtec, allegra, or xyzal as directed. These are antihistamines that can help with runny nose, nasal congestion, sneezing, and helps to dry up post-nasal drip, which usually causes sore throat and cough.      - You can use Flonase (fluticasone) nasal spray as directed for sinus congestion and postnasal drip. This is a steroid nasal spray that works locally over time to decrease the inflammation in your nose/sinuses and help with allergic symptoms. This is not an quick- relief spray like afrin, but it works well if used daily.  Discontinue if you develop nose bleed  - use nasal saline prior to  Flonase.  - Use Ocean Spray Nasal Saline 1-3 puffs each nostril every 2-3 hours then blow out onto tissue. This is to irrigate the nasal passage way to clear the sinus openings. Use until sinus problem resolved.    - you can take plain Mucinex (guaifenesin) 1200 mg twice a day to help loosen mucous.     -warm salt water gargles can help with sore throat    - warm tea with honey can help with cough. Honey is a natural cough suppressant.    - Dextromethorphan (DM) is a cough suppressant over the counter (ie. mucinex DM, robitussin, delsym; dayquil/nyquil has DM as well.)    - Take the tessalon (benzonatate) as needed as prescribed for cough     - Follow up with your PCP or specialty clinic as directed in the next 1-2 weeks if not improved or as needed.  You can call [phone] to schedule an appointment with the appropriate provider.      - Go to the ER if you develop new or worsening symptoms.     - You must understand that you have received an Urgent Care treatment only and that you may be released before all of your medical problems are known or treated.   - You, the patient, will arrange for follow up care as instructed.   - If your condition worsens or fails to improve we recommend that you receive another evaluation at the ER immediately or contact your PCP to discuss your concerns or return here.       Elevated Blood Pressure  Your blood pressure was elevated during your visit to the urgent care.  It was not so high that immediate care was needed but it is recommended that you monitor your blood pressure over the next week or two to make sure that it is not staying elevated.  Please have your blood pressure taken 2-3 times daily at different times of the day.  Write all of those blood pressures down and record the time that they were taken.  Keep all that information and take it with you to see your Primary Care Physician.  If your blood pressure is consistently above 140/90 you will need to follow up with  your PCP more quickly

## 2023-01-11 NOTE — PROGRESS NOTES
"Subjective:       Patient ID: Loni Heard is a 77 y.o. female.    Vitals:  height is 5' 4" (1.626 m) and weight is 78 kg (172 lb). Her oral temperature is 99.2 °F (37.3 °C). Her blood pressure is 175/81 (abnormal) and her pulse is 84. Her respiration is 18 and oxygen saturation is 97%.     Chief Complaint: No chief complaint on file.    This is a 77 y.o. female who presents today with a chief complaint of cough, fatigue,  nasal drip, fever,sweats, sore throat- Sx started last night with post nasal drip and cough, worsened today. Had home positive covid test which she brought with her today in clinic.   Home Tx: zinc    Cough  Associated symptoms include chills, a fever, nasal congestion, postnasal drip, a sore throat and sweats. Pertinent negatives include no chest pain, ear pain, headaches, rash or shortness of breath.     Constitution: Positive for chills, sweating, fatigue and fever.   HENT:  Positive for congestion, postnasal drip and sore throat. Negative for ear pain.    Neck: Negative for neck pain and neck stiffness.   Cardiovascular:  Negative for chest pain, leg swelling and palpitations.   Eyes:  Negative for eye discharge and eye itching.   Respiratory:  Positive for cough. Negative for chest tightness and shortness of breath.    Gastrointestinal:  Negative for abdominal pain, nausea, vomiting and diarrhea.   Genitourinary:  Negative for dysuria and frequency.   Musculoskeletal:  Negative for pain and trauma.   Skin:  Negative for color change and rash.   Neurological:  Negative for dizziness, headaches, numbness and tingling.     Objective:      Physical Exam   Constitutional: She is oriented to person, place, and time. She appears well-developed. She is cooperative.  Non-toxic appearance. She does not appear ill. No distress.   HENT:   Head: Normocephalic and atraumatic.   Ears:   Right Ear: Hearing, tympanic membrane, external ear and ear canal normal.   Left Ear: Hearing, tympanic membrane, " external ear and ear canal normal.   Nose: Nose normal. No mucosal edema, rhinorrhea or nasal deformity. No epistaxis. Right sinus exhibits no maxillary sinus tenderness and no frontal sinus tenderness. Left sinus exhibits no maxillary sinus tenderness and no frontal sinus tenderness.   Mouth/Throat: Uvula is midline, oropharynx is clear and moist and mucous membranes are normal. No trismus in the jaw. Normal dentition. No uvula swelling. No oropharyngeal exudate, posterior oropharyngeal edema or posterior oropharyngeal erythema.   Eyes: Conjunctivae and lids are normal. No scleral icterus.   Neck: Trachea normal and phonation normal. Neck supple. No edema present. No erythema present. No neck rigidity present.   Cardiovascular: Normal rate, regular rhythm, normal heart sounds and normal pulses.   Pulmonary/Chest: Effort normal and breath sounds normal. No accessory muscle usage or stridor. No tachypnea and no bradypnea. No respiratory distress. She has no decreased breath sounds. She has no wheezes. She has no rhonchi. She has no rales.   Abdominal: Normal appearance.   Musculoskeletal: Normal range of motion.         General: No deformity. Normal range of motion.   Lymphadenopathy:     She has no cervical adenopathy.   Neurological: She is alert and oriented to person, place, and time. She exhibits normal muscle tone. Coordination normal.   Skin: Skin is warm, dry, intact, not diaphoretic and not pale.   Psychiatric: Her speech is normal and behavior is normal. Judgment and thought content normal.   Nursing note and vitals reviewed.      Assessment:       1. COVID-19 virus infection        I viewed pt positive home test- [facility]bott antigen test    Plan:       - Discussed ddx, home care, tx options, and given follow up precautions.     - counseled patient and answered questions in regards to COVID-19 testing and diagnosis and quarantine. Discussed home care and follow up precautions.     - discussed paxlovid and  patient does meet criteria. Informed about paxlovid, given fda EUA information to patient about medication and patient wants to take this medication for treatment of covid 19.     COVID-19 virus infection  -     nirmatrelvir-ritonavir 300 mg (150 mg x 2)-100 mg copackaged tablets (EUA); Take 3 tablets by mouth 2 (two) times daily for 5 days. Each dose contains 2 nirmatrelvir (pink tablets) and 1 ritonavir (white tablet). Take all 3 tablets together  Dispense: 30 tablet; Refill: 0  -     benzonatate (TESSALON) 200 MG capsule; Take 1 capsule (200 mg total) by mouth 3 (three) times daily as needed for Cough.  Dispense: 30 capsule; Refill: 0    Instructed discontinue statin x 10 days  Pt no longer taking amlodipine. Reviewed meds with patient and no other of patient's medications need to be held or adjusted while taking paxlovid.     Patient Instructions   Paxlovid interactions:  - DISCONTINUE ROSUVASTATIN FOR 10 DAYS DUE TO INTERACTION WITH PAXLOVID      You have tested positive for COVID-19 today.      ISOLATION    If you tested positive and do not have symptoms, you must isolate for 5 days starting on the day of the positive test.     If you tested positive and have symptoms, you must isolate for 5 days starting on the day of the first symptoms,  not the day of the positive test.    This is the most important part: both the CDC and the [facility] emphasize that you do not test out of isolation.    After 5 days, if your symptoms have improved and you have not had fever on day 5, you can return to the community on day 6- NO TESTING REQUIRED! You must continue to wear mask on days 6-10.    In fact, we do not retest if you were positive in the last 90 days.    After your 5 days of isolation are completed, the CDC recommends strict mask use for the first 5 days that you come out of isolation.       - Rest.    - Drink plenty of fluids.  - Viral upper respiratory infections typically run their course in 10-14 days.     - Tylenol  or Ibuprofen as directed as needed for fever/pain. Avoid tylenol if you have a history of liver disease. Do not take ibuprofen if you have a history of GI bleeding, kidney disease, or if you take blood thinners.     - You can take over-the-counter claritin, zyrtec, allegra, or xyzal as directed. These are antihistamines that can help with runny nose, nasal congestion, sneezing, and helps to dry up post-nasal drip, which usually causes sore throat and cough.      - You can use Flonase (fluticasone) nasal spray as directed for sinus congestion and postnasal drip. This is a steroid nasal spray that works locally over time to decrease the inflammation in your nose/sinuses and help with allergic symptoms. This is not an quick- relief spray like afrin, but it works well if used daily.  Discontinue if you develop nose bleed  - use nasal saline prior to Flonase.  - Use Ocean Spray Nasal Saline 1-3 puffs each nostril every 2-3 hours then blow out onto tissue. This is to irrigate the nasal passage way to clear the sinus openings. Use until sinus problem resolved.    - you can take plain Mucinex (guaifenesin) 1200 mg twice a day to help loosen mucous.     -warm salt water gargles can help with sore throat    - warm tea with honey can help with cough. Honey is a natural cough suppressant.    - Dextromethorphan (DM) is a cough suppressant over the counter (ie. mucinex DM, robitussin, delsym; dayquil/nyquil has DM as well.)    - Take the tessalon (benzonatate) as needed as prescribed for cough     - Follow up with your PCP or specialty clinic as directed in the next 1-2 weeks if not improved or as needed.  You can call [phone] to schedule an appointment with the appropriate provider.      - Go to the ER if you develop new or worsening symptoms.     - You must understand that you have received an Urgent Care treatment only and that you may be released before all of your medical problems are known or treated.   - You, the  patient, will arrange for follow up care as instructed.   - If your condition worsens or fails to improve we recommend that you receive another evaluation at the ER immediately or contact your PCP to discuss your concerns or return here.       Elevated Blood Pressure  Your blood pressure was elevated during your visit to the urgent care.  It was not so high that immediate care was needed but it is recommended that you monitor your blood pressure over the next week or two to make sure that it is not staying elevated.  Please have your blood pressure taken 2-3 times daily at different times of the day.  Write all of those blood pressures down and record the time that they were taken.  Keep all that information and take it with you to see your Primary Care Physician.  If your blood pressure is consistently above 140/90 you will need to follow up with your PCP more quickly

## 2023-01-17 ENCOUNTER — LAB VISIT (OUTPATIENT)
Dept: LAB | Facility: HOSPITAL | Age: 78
End: 2023-01-17
Attending: INTERNAL MEDICINE
Payer: MEDICARE

## 2023-01-17 ENCOUNTER — PATIENT MESSAGE (OUTPATIENT)
Dept: INTERNAL MEDICINE | Facility: CLINIC | Age: 78
End: 2023-01-17
Payer: COMMERCIAL

## 2023-01-17 DIAGNOSIS — I10 PRIMARY HYPERTENSION: ICD-10-CM

## 2023-01-17 DIAGNOSIS — E78.49 OTHER HYPERLIPIDEMIA: ICD-10-CM

## 2023-01-17 DIAGNOSIS — E53.8 B12 DEFICIENCY: Primary | ICD-10-CM

## 2023-01-17 DIAGNOSIS — E11.8 TYPE 2 DIABETES MELLITUS WITH UNSPECIFIED COMPLICATIONS: ICD-10-CM

## 2023-01-17 LAB
ALBUMIN SERPL BCP-MCNC: 4.2 G/DL (ref 3.5–5.2)
ALP SERPL-CCNC: 85 U/L (ref 55–135)
ALT SERPL W/O P-5'-P-CCNC: 18 U/L (ref 10–44)
ANION GAP SERPL CALC-SCNC: 9 MMOL/L (ref 8–16)
AST SERPL-CCNC: 21 U/L (ref 10–40)
BASOPHILS # BLD AUTO: 0.02 K/UL (ref 0–0.2)
BASOPHILS NFR BLD: 0.3 % (ref 0–1.9)
BILIRUB SERPL-MCNC: 0.4 MG/DL (ref 0.1–1)
BUN SERPL-MCNC: 15 MG/DL (ref 8–23)
CALCIUM SERPL-MCNC: 10.6 MG/DL (ref 8.7–10.5)
CHLORIDE SERPL-SCNC: 102 MMOL/L (ref 95–110)
CHOLEST SERPL-MCNC: 222 MG/DL (ref 120–199)
CHOLEST/HDLC SERPL: 5.2 {RATIO} (ref 2–5)
CO2 SERPL-SCNC: 28 MMOL/L (ref 23–29)
CREAT SERPL-MCNC: 0.8 MG/DL (ref 0.5–1.4)
DIFFERENTIAL METHOD: ABNORMAL
EOSINOPHIL # BLD AUTO: 0.1 K/UL (ref 0–0.5)
EOSINOPHIL NFR BLD: 0.7 % (ref 0–8)
ERYTHROCYTE [DISTWIDTH] IN BLOOD BY AUTOMATED COUNT: 12.4 % (ref 11.5–14.5)
EST. GFR  (NO RACE VARIABLE): >60 ML/MIN/1.73 M^2
ESTIMATED AVG GLUCOSE: 140 MG/DL (ref 68–131)
GLUCOSE SERPL-MCNC: 129 MG/DL (ref 70–110)
HBA1C MFR BLD: 6.5 % (ref 4–5.6)
HCT VFR BLD AUTO: 43.7 % (ref 37–48.5)
HDLC SERPL-MCNC: 43 MG/DL (ref 40–75)
HDLC SERPL: 19.4 % (ref 20–50)
HGB BLD-MCNC: 13.7 G/DL (ref 12–16)
IMM GRANULOCYTES # BLD AUTO: 0.03 K/UL (ref 0–0.04)
IMM GRANULOCYTES NFR BLD AUTO: 0.4 % (ref 0–0.5)
LDLC SERPL CALC-MCNC: 128.8 MG/DL (ref 63–159)
LYMPHOCYTES # BLD AUTO: 2.4 K/UL (ref 1–4.8)
LYMPHOCYTES NFR BLD: 31.2 % (ref 18–48)
MCH RBC QN AUTO: 29 PG (ref 27–31)
MCHC RBC AUTO-ENTMCNC: 31.4 G/DL (ref 32–36)
MCV RBC AUTO: 92 FL (ref 82–98)
MONOCYTES # BLD AUTO: 0.5 K/UL (ref 0.3–1)
MONOCYTES NFR BLD: 7.1 % (ref 4–15)
NEUTROPHILS # BLD AUTO: 4.6 K/UL (ref 1.8–7.7)
NEUTROPHILS NFR BLD: 60.3 % (ref 38–73)
NONHDLC SERPL-MCNC: 179 MG/DL
NRBC BLD-RTO: 0 /100 WBC
PLATELET # BLD AUTO: 262 K/UL (ref 150–450)
PMV BLD AUTO: 11.3 FL (ref 9.2–12.9)
POTASSIUM SERPL-SCNC: 5.3 MMOL/L (ref 3.5–5.1)
PROT SERPL-MCNC: 7.4 G/DL (ref 6–8.4)
RBC # BLD AUTO: 4.73 M/UL (ref 4–5.4)
SODIUM SERPL-SCNC: 139 MMOL/L (ref 136–145)
TRIGL SERPL-MCNC: 251 MG/DL (ref 30–150)
WBC # BLD AUTO: 7.56 K/UL (ref 3.9–12.7)

## 2023-01-17 PROCEDURE — 80053 COMPREHEN METABOLIC PANEL: CPT | Performed by: INTERNAL MEDICINE

## 2023-01-17 PROCEDURE — 36415 COLL VENOUS BLD VENIPUNCTURE: CPT | Performed by: INTERNAL MEDICINE

## 2023-01-17 PROCEDURE — 83036 HEMOGLOBIN GLYCOSYLATED A1C: CPT | Performed by: INTERNAL MEDICINE

## 2023-01-17 PROCEDURE — 85025 COMPLETE CBC W/AUTO DIFF WBC: CPT | Performed by: INTERNAL MEDICINE

## 2023-01-17 PROCEDURE — 80061 LIPID PANEL: CPT | Performed by: INTERNAL MEDICINE

## 2023-01-17 RX ORDER — CYANOCOBALAMIN 1000 UG/ML
INJECTION, SOLUTION INTRAMUSCULAR; SUBCUTANEOUS
Qty: 9 ML | Refills: 11 | Status: SHIPPED | OUTPATIENT
Start: 2023-01-17 | End: 2023-01-20 | Stop reason: SDUPTHER

## 2023-01-18 ENCOUNTER — PATIENT MESSAGE (OUTPATIENT)
Dept: CARDIOLOGY | Facility: CLINIC | Age: 78
End: 2023-01-18
Payer: COMMERCIAL

## 2023-01-20 ENCOUNTER — OFFICE VISIT (OUTPATIENT)
Dept: INTERNAL MEDICINE | Facility: CLINIC | Age: 78
End: 2023-01-20
Payer: MEDICARE

## 2023-01-20 VITALS
OXYGEN SATURATION: 96 % | DIASTOLIC BLOOD PRESSURE: 68 MMHG | SYSTOLIC BLOOD PRESSURE: 132 MMHG | WEIGHT: 169.06 LBS | HEART RATE: 72 BPM | HEIGHT: 64 IN | BODY MASS INDEX: 28.86 KG/M2

## 2023-01-20 DIAGNOSIS — E11.9 TYPE 2 DIABETES MELLITUS WITHOUT COMPLICATION, WITHOUT LONG-TERM CURRENT USE OF INSULIN: Primary | ICD-10-CM

## 2023-01-20 DIAGNOSIS — E53.8 B12 DEFICIENCY: ICD-10-CM

## 2023-01-20 DIAGNOSIS — E55.9 MILD VITAMIN D DEFICIENCY: ICD-10-CM

## 2023-01-20 DIAGNOSIS — U07.1 COVID-19: ICD-10-CM

## 2023-01-20 DIAGNOSIS — Z85.3 HX: BREAST CANCER: ICD-10-CM

## 2023-01-20 DIAGNOSIS — E78.5 HYPERLIPIDEMIA, UNSPECIFIED HYPERLIPIDEMIA TYPE: ICD-10-CM

## 2023-01-20 DIAGNOSIS — I10 HYPERTENSION, UNSPECIFIED TYPE: ICD-10-CM

## 2023-01-20 PROCEDURE — 99215 OFFICE O/P EST HI 40 MIN: CPT | Mod: PBBFAC | Performed by: INTERNAL MEDICINE

## 2023-01-20 PROCEDURE — 99214 OFFICE O/P EST MOD 30 MIN: CPT | Mod: S$PBB,CR,, | Performed by: INTERNAL MEDICINE

## 2023-01-20 PROCEDURE — 99999 PR PBB SHADOW E&M-EST. PATIENT-LVL V: ICD-10-PCS | Mod: PBBFAC,CR,, | Performed by: INTERNAL MEDICINE

## 2023-01-20 PROCEDURE — 99999 PR PBB SHADOW E&M-EST. PATIENT-LVL V: CPT | Mod: PBBFAC,CR,, | Performed by: INTERNAL MEDICINE

## 2023-01-20 PROCEDURE — 99214 PR OFFICE/OUTPT VISIT, EST, LEVL IV, 30-39 MIN: ICD-10-PCS | Mod: S$PBB,CR,, | Performed by: INTERNAL MEDICINE

## 2023-01-20 RX ORDER — CYANOCOBALAMIN 1000 UG/ML
INJECTION, SOLUTION INTRAMUSCULAR; SUBCUTANEOUS
Qty: 9 ML | Refills: 11 | Status: SHIPPED | OUTPATIENT
Start: 2023-01-20

## 2023-01-20 NOTE — PROGRESS NOTES
"Subjective:       Patient ID: Loni Heard is a 77 y.o. female.    Chief Complaint: Follow-up  This is a 77-year-old who presents today for follow-up she reports that she had been doing fairly well but she developed COVID more recently had a positive test and was treated with Paxlovid she held her statin well taking but then did resume the other day when cardiology recommended she get back on it because of her lipids.  After her last cardiology consultation she did agree to starting statin due to her increased cardiac score risk she seems to be doing well with it she had been tolerating rosuvastatin.  She had been trying to get a B12 covered by her insurance but currently they will cover vitamins or B12 injections she would like a new prescription sent to the [facility] pharmacy where it may be cheaper for her  She does have some fatigue and notes some joint pains with her    Follow-up  Associated symptoms include arthralgias and fatigue.   Review of Systems   Constitutional:  Positive for fatigue.   Musculoskeletal:  Positive for arthralgias.     Objective:    Blood pressure 132/68, pulse 72, height 5' 4" (1.626 m), weight 76.7 kg (169 lb 1.5 oz), SpO2 96 %.   Physical Exam  Constitutional:       General: She is not in acute distress.  HENT:      Head: Normocephalic.   Eyes:      General: No scleral icterus.  Cardiovascular:      Rate and Rhythm: Normal rate and regular rhythm.      Heart sounds: Normal heart sounds. No murmur heard.    No friction rub. No gallop.      Comments: Surgical radiations changes left breast   Pulmonary:      Effort: Pulmonary effort is normal. No respiratory distress.      Breath sounds: Normal breath sounds.   Abdominal:      General: Bowel sounds are normal.      Palpations: Abdomen is soft. There is no mass.      Tenderness: There is no abdominal tenderness.   Skin:     Findings: No erythema.   Neurological:      Mental Status: She is alert.   Psychiatric:         Mood and " Affect: Mood normal.       Assessment:       1. Type 2 diabetes mellitus without complication, without long-term current use of insulin    2. Hypertension, unspecified type    3. B12 deficiency    4. HX: breast cancer    5. Hyperlipidemia, unspecified hyperlipidemia type    6. COVID-19    7. Mild vitamin D deficiency          Plan:       Loni was seen today for follow-up.    Diagnoses and all orders for this visit:    Type 2 diabetes mellitus without complication, without long-term current use of insulin  History of continue current regimen hemoglobin A1c fairly stable  -     Microalbumin/Creatinine Ratio, Urine; Future  -     Comprehensive Metabolic Panel; Future  -     Hemoglobin A1C; Future  -     Vitamin D; Future  -     CBC Auto Differential; Future  -     Lipid Panel; Future    Hypertension, unspecified type  Blood pressure some fluctuations we discussed amlodipine she has not been taking she may consider resuming at half tablet if her blood pressure is elevated  -     Comprehensive Metabolic Panel; Future  -     Hemoglobin A1C; Future  -     Vitamin D; Future  -     CBC Auto Differential; Future  -     Lipid Panel; Future    B12 deficiency  She prefers taking B12 twice monthly her insurance would cover but she would like a new prescription sent here so she can pick it up this was done as she has history of B12 deficiency and previous atrophic gastritis  -     cyanocobalamin 1,000 mcg/mL injection; Inject 1000 mcg into the muscle twice monthly.    HX: breast cancer  History of breast cancer she continues to follow with Hematology oncology and will be due for her mammogram this spring she remains on letrozole but thinks this will be her last year    Hyperlipidemia, unspecified hyperlipidemia type  She did see Cardiology for consultation and was started on rosuvastatin which she has been tolerating but she was not taking it more recently with her episode of COVID she has resumed    COVID-19  Recent episode  of continue precautions   Symptoms improved some bit fatigued    Mild vitamin D deficiency  -     Vitamin D; Future    Follow-up 4 months  Discussed lower potassium diet and resume her vitamin-D

## 2023-02-16 ENCOUNTER — LAB VISIT (OUTPATIENT)
Dept: LAB | Facility: HOSPITAL | Age: 78
End: 2023-02-16
Attending: INTERNAL MEDICINE
Payer: MEDICARE

## 2023-02-16 DIAGNOSIS — E11.9 TYPE 2 DIABETES MELLITUS WITHOUT COMPLICATION, WITHOUT LONG-TERM CURRENT USE OF INSULIN: ICD-10-CM

## 2023-02-16 LAB
ALBUMIN/CREAT UR: NORMAL UG/MG (ref 0–30)
CREAT UR-MCNC: 45 MG/DL (ref 15–325)
MICROALBUMIN UR DL<=1MG/L-MCNC: <5 UG/ML

## 2023-02-16 PROCEDURE — 82570 ASSAY OF URINE CREATININE: CPT | Performed by: INTERNAL MEDICINE

## 2023-03-30 ENCOUNTER — PATIENT MESSAGE (OUTPATIENT)
Dept: INTERNAL MEDICINE | Facility: CLINIC | Age: 78
End: 2023-03-30
Payer: COMMERCIAL

## 2023-03-30 ENCOUNTER — TELEPHONE (OUTPATIENT)
Dept: INTERNAL MEDICINE | Facility: CLINIC | Age: 78
End: 2023-03-30
Payer: COMMERCIAL

## 2023-03-30 NOTE — LETTER
March 31, 2023        Mario Heard  [address] [facility] [address]             [facility]  [address]  [facility] [address]  Phone: [phone]  Fax: [phone]   Patient: Loni Heard   MR Number: [id number]   YOB: 1945   Date of Visit: 3/30/2023     To whomever it may concern,   Ms. Loni Heard had covid in January 2023 with full resolution. She does not have any symptoms of covid now.         Sincerely,  Yasmin Baird MD

## 2023-03-30 NOTE — TELEPHONE ENCOUNTER
----- Message from Jeanninecally Allen sent at 3/30/2023  1:27 PM CDT -----  Regarding: pt call back  Name of Who is Calling: VIDAL MOORE [[id number]]        What is the request in detail: Pt wants to know how would she go about getting documentation stating that she does not have covid anymore? So that she can go on her trip. Please advise.         Can the clinic reply by [facility]: no          [facility]: [phone]

## 2023-03-31 ENCOUNTER — TELEPHONE (OUTPATIENT)
Dept: INTERNAL MEDICINE | Facility: CLINIC | Age: 78
End: 2023-03-31
Payer: COMMERCIAL

## 2023-03-31 ENCOUNTER — PATIENT MESSAGE (OUTPATIENT)
Dept: INTERNAL MEDICINE | Facility: CLINIC | Age: 78
End: 2023-03-31
Payer: COMMERCIAL

## 2023-03-31 NOTE — TELEPHONE ENCOUNTER
----- Message from Alisha Collins sent at 3/31/2023  9:32 AM CDT -----  Contact: [phone]  Patient is returning a phone call.  Who left a message for the patient: Isabela Collins MA  Does patient know what this is regarding:    Would you like a call back, or a response through your [facility] portal?:   call back  Comments:

## 2023-03-31 NOTE — TELEPHONE ENCOUNTER
Pt states she need to know if you would like her to come in for a COVID test in order to write letter for trip stating she don't have COVID.pt states she can come in today between 1:15 and 1:30 I advised pt that she would have to be added to schedule pt will be leaving on Monday.

## 2023-03-31 NOTE — TELEPHONE ENCOUNTER
Pt call in regards to what Dr was covering for Dr Mallory Pt was informed Dr Baird pt states she had some question and would call back later pt was in class.

## 2023-04-25 ENCOUNTER — OFFICE VISIT (OUTPATIENT)
Dept: PRIMARY CARE CLINIC | Facility: CLINIC | Age: 78
End: 2023-04-25
Attending: FAMILY MEDICINE
Payer: MEDICARE

## 2023-04-25 DIAGNOSIS — J06.9 VIRAL URI: Primary | ICD-10-CM

## 2023-04-25 DIAGNOSIS — I10 ESSENTIAL HYPERTENSION: ICD-10-CM

## 2023-04-25 PROCEDURE — 99213 PR OFFICE/OUTPT VISIT, EST, LEVL III, 20-29 MIN: ICD-10-PCS | Mod: 95,,, | Performed by: FAMILY MEDICINE

## 2023-04-25 PROCEDURE — 99213 OFFICE O/P EST LOW 20 MIN: CPT | Mod: 95,,, | Performed by: FAMILY MEDICINE

## 2023-04-25 NOTE — PROGRESS NOTES
Subjective:       Patient ID: Loni Heard is a 77 y.o. female.    Chief Complaint: URI x 1 week    The patient location is: home  The chief complaint leading to consultation is: above    Visit type: audiovisual    Face to Face time with patient: 15 minutes 20 minutes of total time spent on the encounter, which includes face to face time and non-face to face time preparing to see the patient (eg, review of tests), Obtaining and/or reviewing separately obtained history, Documenting clinical information in the electronic or other health record, Independently interpreting results (not separately reported) and communicating results to the patient/family/caregiver, or Care coordination (not separately reported).     Each patient to whom he or she provides medical services by telemedicine is:  (1) informed of the relationship between the physician and patient and the respective role of any other health care provider with respect to management of the patient; and (2) notified that he or she may decline to receive medical services by telemedicine and may withdraw from such care at any time.    Notes:     Pt presents today w URI that she states has improved in past few days. She has less symptoms such as runny nose cough and no fever x 3 days. Pt was traveling abroad x 2 weeks and is at home.  She does want an injection to clear her of congestion- pt is type 2 DM    Cough  This is a new problem. The current episode started in the past 7 days. The problem has been gradually improving. The problem occurs hourly. The cough is Productive of sputum. Associated symptoms include myalgias, postnasal drip and rhinorrhea. Pertinent negatives include no chest pain, chills, ear congestion, ear pain, fever, headaches, heartburn, hemoptysis, nasal congestion, rash, sore throat, shortness of breath, sweats, weight loss or wheezing. The symptoms are aggravated by lying down. She has tried OTC cough suppressant for the symptoms.  The treatment provided mild relief. Her past medical history is significant for bronchitis.   Review of Systems   Constitutional:  Positive for activity change and fatigue. Negative for appetite change, chills, diaphoresis, fever and weight loss.   HENT:  Positive for congestion, postnasal drip, rhinorrhea and sinus pressure. Negative for dental problem, drooling, ear discharge, ear pain, facial swelling, hearing loss, mouth sores, nosebleeds, sinus pain, sneezing, sore throat, tinnitus, trouble swallowing and voice change.    Respiratory:  Positive for cough. Negative for hemoptysis, shortness of breath and wheezing.    Cardiovascular:  Negative for chest pain.   Gastrointestinal:  Negative for heartburn.   Musculoskeletal:  Positive for myalgias.   Skin:  Negative for rash.   Neurological:  Negative for headaches.   All other systems reviewed and are negative.      Objective:      Physical Exam  Vitals reviewed.   Constitutional:       Appearance: Normal appearance. She is well-developed.   HENT:      Head: Normocephalic and atraumatic.      Mouth/Throat:      Pharynx: No oropharyngeal exudate or posterior oropharyngeal erythema.   Eyes:      Extraocular Movements: Extraocular movements intact.   Pulmonary:      Effort: Pulmonary effort is normal.   Musculoskeletal:         General: Normal range of motion.      Cervical back: Neck supple.      Right lower leg: No edema.      Left lower leg: No edema.   Neurological:      Mental Status: She is alert and oriented to person, place, and time.   Psychiatric:         Mood and Affect: Mood normal.         Behavior: Behavior normal.         Thought Content: Thought content normal.         Judgment: Judgment normal.       Assessment:       1. Essential hypertension      2. URI  Plan:   1. Essential hypertension    BP stable   DMt2: stable   URI: suspect viral and with symptoms improving at day 7 would suggest that patient take sudafed prn, rest, fluids, tylenol prn and  observe. If not improving pt can call for abx, okay to give!   I have declined steroid injection for patient in light of her diabetes, pt amenable with this plan

## 2023-05-11 NOTE — TELEPHONE ENCOUNTER
Care Due:                  Date            Visit Type   Department     Provider  --------------------------------------------------------------------------------                                EP -                              [facility] INTERNAL  Mariaelena Lopez  Last Visit: 01-      CARE ([facility])   MEDICINE       Mohamud                              [facility] INTERNAL  Mariaelena Lopez  Next Visit: 05-      CARE ([facility])   MEDICINE       Mohamud                                                            Last  Test          Frequency    Reason                     Performed    Due Date  --------------------------------------------------------------------------------    HBA1C.......  6 months...  SITagliptin..............  01- 07-    [facility] Embedded Care Due Messages. Reference number: [id number].   5/11/2023 4:47:30 AM CDT

## 2023-05-11 NOTE — TELEPHONE ENCOUNTER
Refill Decision Note   Loni Heard  is requesting a refill authorization.  Brief Assessment and Rationale for Refill:  Approve     Medication Therapy Plan:  FLOS      Comments:     Note composed:11:00 AM 05/11/2023             Appointments     Last Visit   1/20/2023 Mariaelena Mallory MD   Next Visit   5/23/2023 Mariaelena Mallory MD

## 2023-05-16 ENCOUNTER — LAB VISIT (OUTPATIENT)
Dept: LAB | Facility: HOSPITAL | Age: 78
End: 2023-05-16
Attending: INTERNAL MEDICINE
Payer: MEDICARE

## 2023-05-16 DIAGNOSIS — E55.9 MILD VITAMIN D DEFICIENCY: ICD-10-CM

## 2023-05-16 DIAGNOSIS — I10 HYPERTENSION, UNSPECIFIED TYPE: ICD-10-CM

## 2023-05-16 DIAGNOSIS — E11.9 TYPE 2 DIABETES MELLITUS WITHOUT COMPLICATION, WITHOUT LONG-TERM CURRENT USE OF INSULIN: ICD-10-CM

## 2023-05-16 LAB
25(OH)D3+25(OH)D2 SERPL-MCNC: 38 NG/ML (ref 30–96)
ALBUMIN SERPL BCP-MCNC: 4.1 G/DL (ref 3.5–5.2)
ALP SERPL-CCNC: 75 U/L (ref 55–135)
ALT SERPL W/O P-5'-P-CCNC: 21 U/L (ref 10–44)
ANION GAP SERPL CALC-SCNC: 11 MMOL/L (ref 8–16)
AST SERPL-CCNC: 20 U/L (ref 10–40)
BASOPHILS # BLD AUTO: 0.03 K/UL (ref 0–0.2)
BASOPHILS NFR BLD: 0.4 % (ref 0–1.9)
BILIRUB SERPL-MCNC: 0.5 MG/DL (ref 0.1–1)
BUN SERPL-MCNC: 18 MG/DL (ref 8–23)
CALCIUM SERPL-MCNC: 10.1 MG/DL (ref 8.7–10.5)
CHLORIDE SERPL-SCNC: 105 MMOL/L (ref 95–110)
CHOLEST SERPL-MCNC: 167 MG/DL (ref 120–199)
CHOLEST/HDLC SERPL: 3 {RATIO} (ref 2–5)
CO2 SERPL-SCNC: 27 MMOL/L (ref 23–29)
CREAT SERPL-MCNC: 0.8 MG/DL (ref 0.5–1.4)
DIFFERENTIAL METHOD: ABNORMAL
EOSINOPHIL # BLD AUTO: 0.1 K/UL (ref 0–0.5)
EOSINOPHIL NFR BLD: 0.8 % (ref 0–8)
ERYTHROCYTE [DISTWIDTH] IN BLOOD BY AUTOMATED COUNT: 12.3 % (ref 11.5–14.5)
EST. GFR  (NO RACE VARIABLE): >60 ML/MIN/1.73 M^2
ESTIMATED AVG GLUCOSE: 143 MG/DL (ref 68–131)
GLUCOSE SERPL-MCNC: 149 MG/DL (ref 70–110)
HBA1C MFR BLD: 6.6 % (ref 4–5.6)
HCT VFR BLD AUTO: 40.6 % (ref 37–48.5)
HDLC SERPL-MCNC: 55 MG/DL (ref 40–75)
HDLC SERPL: 32.9 % (ref 20–50)
HGB BLD-MCNC: 12.9 G/DL (ref 12–16)
IMM GRANULOCYTES # BLD AUTO: 0.02 K/UL (ref 0–0.04)
IMM GRANULOCYTES NFR BLD AUTO: 0.3 % (ref 0–0.5)
LDLC SERPL CALC-MCNC: 86 MG/DL (ref 63–159)
LYMPHOCYTES # BLD AUTO: 2 K/UL (ref 1–4.8)
LYMPHOCYTES NFR BLD: 28.6 % (ref 18–48)
MCH RBC QN AUTO: 29.2 PG (ref 27–31)
MCHC RBC AUTO-ENTMCNC: 31.8 G/DL (ref 32–36)
MCV RBC AUTO: 92 FL (ref 82–98)
MONOCYTES # BLD AUTO: 0.6 K/UL (ref 0.3–1)
MONOCYTES NFR BLD: 7.8 % (ref 4–15)
NEUTROPHILS # BLD AUTO: 4.4 K/UL (ref 1.8–7.7)
NEUTROPHILS NFR BLD: 62.1 % (ref 38–73)
NONHDLC SERPL-MCNC: 112 MG/DL
NRBC BLD-RTO: 0 /100 WBC
PLATELET # BLD AUTO: 220 K/UL (ref 150–450)
PMV BLD AUTO: 11 FL (ref 9.2–12.9)
POTASSIUM SERPL-SCNC: 4.9 MMOL/L (ref 3.5–5.1)
PROT SERPL-MCNC: 6.9 G/DL (ref 6–8.4)
RBC # BLD AUTO: 4.42 M/UL (ref 4–5.4)
SODIUM SERPL-SCNC: 143 MMOL/L (ref 136–145)
TRIGL SERPL-MCNC: 130 MG/DL (ref 30–150)
WBC # BLD AUTO: 7.09 K/UL (ref 3.9–12.7)

## 2023-05-16 PROCEDURE — 36415 COLL VENOUS BLD VENIPUNCTURE: CPT | Performed by: INTERNAL MEDICINE

## 2023-05-16 PROCEDURE — 83036 HEMOGLOBIN GLYCOSYLATED A1C: CPT | Performed by: INTERNAL MEDICINE

## 2023-05-16 PROCEDURE — 80053 COMPREHEN METABOLIC PANEL: CPT | Performed by: INTERNAL MEDICINE

## 2023-05-16 PROCEDURE — 80061 LIPID PANEL: CPT | Performed by: INTERNAL MEDICINE

## 2023-05-16 PROCEDURE — 85025 COMPLETE CBC W/AUTO DIFF WBC: CPT | Performed by: INTERNAL MEDICINE

## 2023-05-16 PROCEDURE — 82306 VITAMIN D 25 HYDROXY: CPT | Performed by: INTERNAL MEDICINE

## 2023-05-23 ENCOUNTER — OFFICE VISIT (OUTPATIENT)
Dept: INTERNAL MEDICINE | Facility: CLINIC | Age: 78
End: 2023-05-23
Payer: MEDICARE

## 2023-05-23 VITALS
SYSTOLIC BLOOD PRESSURE: 144 MMHG | HEART RATE: 67 BPM | BODY MASS INDEX: 28.56 KG/M2 | OXYGEN SATURATION: 100 % | DIASTOLIC BLOOD PRESSURE: 70 MMHG | HEIGHT: 64 IN | WEIGHT: 167.31 LBS

## 2023-05-23 DIAGNOSIS — R42 VERTIGO: ICD-10-CM

## 2023-05-23 DIAGNOSIS — I10 PRIMARY HYPERTENSION: ICD-10-CM

## 2023-05-23 DIAGNOSIS — E11.9 TYPE 2 DIABETES MELLITUS WITHOUT COMPLICATION, WITHOUT LONG-TERM CURRENT USE OF INSULIN: Primary | ICD-10-CM

## 2023-05-23 DIAGNOSIS — E53.8 B12 DEFICIENCY: ICD-10-CM

## 2023-05-23 DIAGNOSIS — Z85.3 HX: BREAST CANCER: ICD-10-CM

## 2023-05-23 DIAGNOSIS — E78.5 HYPERLIPIDEMIA, UNSPECIFIED HYPERLIPIDEMIA TYPE: ICD-10-CM

## 2023-05-23 DIAGNOSIS — Z78.0 POSTMENOPAUSAL: ICD-10-CM

## 2023-05-23 PROCEDURE — 99214 OFFICE O/P EST MOD 30 MIN: CPT | Mod: S$PBB,,, | Performed by: INTERNAL MEDICINE

## 2023-05-23 PROCEDURE — 99999 PR PBB SHADOW E&M-EST. PATIENT-LVL V: CPT | Mod: PBBFAC,,, | Performed by: INTERNAL MEDICINE

## 2023-05-23 PROCEDURE — 99999 PR PBB SHADOW E&M-EST. PATIENT-LVL V: ICD-10-PCS | Mod: PBBFAC,,, | Performed by: INTERNAL MEDICINE

## 2023-05-23 PROCEDURE — 99215 OFFICE O/P EST HI 40 MIN: CPT | Mod: PBBFAC | Performed by: INTERNAL MEDICINE

## 2023-05-23 PROCEDURE — 99214 PR OFFICE/OUTPT VISIT, EST, LEVL IV, 30-39 MIN: ICD-10-PCS | Mod: S$PBB,,, | Performed by: INTERNAL MEDICINE

## 2023-05-23 NOTE — PROGRESS NOTES
"Subjective:       Patient ID: Loni Heard is a 77 y.o. female.    Chief Complaint: Follow-up  This is a 77-year-old who presents today for follow-up.  She has been under some increased stress recently with school and also went on a trip to [facility] reports when she returned she was sick with an upper respiratory infection that lasted for a while she had a bronchitis which would go away she reports it did resolve and since then she has been having some issues with vertigo at times she was wondering if she can get in with some vestibular therapy as her doctor friend told her she would benefit from that she reports she notices it more when she is swimming with her head down or she was lying on the table for massage and when she gets up suddenly she will feel vertigo.  She continues to follow with her outlying hematologist in breast surgeon has upcoming appointment in mammogram planned this fall.  She has been continuing to use her B12 injections although not coverd by her insurance     Follow-up    Review of Systems   Constitutional:         Weight down a bit   Eating healthier less redmeat    Neurological:         Vertigo  Bad uri in April after trip to [facility]     Objective:    Blood pressure (!) 144/70, pulse 67, height 5' 4" (1.626 m), weight 75.9 kg (167 lb 5.3 oz), SpO2 100 %.   Physical Exam  Constitutional:       General: She is not in acute distress.  HENT:      Head: Normocephalic.      Comments: Tm clear      Mouth/Throat:      Pharynx: Oropharynx is clear.   Eyes:      General: No scleral icterus.  Cardiovascular:      Rate and Rhythm: Normal rate and regular rhythm.      Heart sounds: Normal heart sounds. No murmur heard.    No friction rub. No gallop.      Comments: Surgical/radiation changes left  Discomfort stable   Pulmonary:      Effort: Pulmonary effort is normal. No respiratory distress.      Breath sounds: Normal breath sounds.   Abdominal:      General: Bowel sounds are normal.      " Palpations: Abdomen is soft. There is no mass.      Tenderness: There is no abdominal tenderness.   Skin:     Findings: No erythema.   Neurological:      Mental Status: She is alert.   Psychiatric:         Mood and Affect: Mood normal.       Assessment:       1. Type 2 diabetes mellitus without complication, without long-term current use of insulin    2. Primary hypertension    3. B12 deficiency    4. HX: breast cancer    5. Vertigo    6. Postmenopausal        Plan:       Loni was seen today for follow-up.    Diagnoses and all orders for this visit:    Type 2 diabetes mellitus without complication, without long-term current use of insulin  History of improved  -     CBC Auto Differential; Future  -     Comprehensive Metabolic Panel; Future  -     Hemoglobin A1C; Future  -     Lipid Panel; Future    Primary hypertension  Blood pressure elevated she has not been taking her amlodipine discussed resume home monitoring if elevated she will resume medications  -     CBC Auto Differential; Future  -     Comprehensive Metabolic Panel; Future  -     Hemoglobin A1C; Future  -     Lipid Panel; Future    B12 deficiency  Discussed with patient she is taking her B12 injections    HX: breast cancer  She continues to follow with her hematologist and breast surgeon    Vertigo  Referral placed for vestibular physical therapy  -     Ambulatory referral/consult to Physical/Occupational Therapy; Future    Hyperlipidemia improvement on rosuvastatin she will follow-up with cardiology as recommended    Postmenopausal  She will be due for follow-up in October  -     DXA Bone Density Axial Skeleton 1 or more sites; Future    Labs reviewed follow-up 4 months

## 2023-05-30 ENCOUNTER — OFFICE VISIT (OUTPATIENT)
Dept: ORTHOPEDICS | Facility: CLINIC | Age: 78
End: 2023-05-30
Payer: MEDICARE

## 2023-05-30 VITALS — HEIGHT: 64 IN | BODY MASS INDEX: 28.56 KG/M2 | WEIGHT: 167.31 LBS

## 2023-05-30 DIAGNOSIS — M77.42 METATARSALGIA OF LEFT FOOT: Primary | ICD-10-CM

## 2023-05-30 DIAGNOSIS — Q82.8 PLANTAR KERATOSIS: ICD-10-CM

## 2023-05-30 PROCEDURE — 99214 OFFICE O/P EST MOD 30 MIN: CPT | Mod: PBBFAC | Performed by: ORTHOPAEDIC SURGERY

## 2023-05-30 PROCEDURE — 99999 PR PBB SHADOW E&M-EST. PATIENT-LVL IV: ICD-10-PCS | Mod: PBBFAC,,, | Performed by: ORTHOPAEDIC SURGERY

## 2023-05-30 PROCEDURE — 99999 PR PBB SHADOW E&M-EST. PATIENT-LVL IV: CPT | Mod: PBBFAC,,, | Performed by: ORTHOPAEDIC SURGERY

## 2023-05-30 PROCEDURE — 99213 PR OFFICE/OUTPT VISIT, EST, LEVL III, 20-29 MIN: ICD-10-PCS | Mod: S$PBB,,, | Performed by: ORTHOPAEDIC SURGERY

## 2023-05-30 PROCEDURE — 99213 OFFICE O/P EST LOW 20 MIN: CPT | Mod: S$PBB,,, | Performed by: ORTHOPAEDIC SURGERY

## 2023-05-30 NOTE — PROGRESS NOTES
Loni Heard  Returns today with a new problem.  This is a 77-year-old female who I have seen previously for ankle sprain, toe contusion, and neuroma; she comes in today because of which she thinks might be a plantar wart on the bottom of her left foot.  She states she noticed this lesion about two months ago and it is fairly tender to walk on.  She states she has a history of plantar warts when she was younger that were pretty severe and she did not want to let this get as bad.  She admits that she tends not to wear shoes even out of the house when she is gardening..    Examination:  She walks in today with a normal gait.  On standing inspection she has plantigrade alignment of her feet.  Inspection of the bottom of her left foot reveals a hyperkeratotic lesion with a central core underneath the 3rd metatarsal head.  The lesion is tender to direct pressure as well as lateral pressure.  It does not appear to be a plantar wart but more of a deep seeded callus.    Impression:  1. Metatarsalgia of left foot        2. Plantar keratosis          Recommendation:  Using a 15 blade I pared the lesion down.  I did not encounter any punctate bleeding to suggest a plantar wart.  Her symptoms were somewhat improved after I shaved the lesion down.  I suggested to her that she should be in shoes when she is out of the house and this may also help with her pain.  I suggested that she get an emery board or a pumice stone to use to keep the lesion shaved down.  I am going to put in a referral to podiatry in case this lesion worsens over time.      Follow-up as needed

## 2023-05-31 ENCOUNTER — TELEPHONE (OUTPATIENT)
Dept: ADMINISTRATIVE | Facility: HOSPITAL | Age: 78
End: 2023-05-31
Payer: COMMERCIAL

## 2023-06-02 ENCOUNTER — TELEPHONE (OUTPATIENT)
Dept: ADMINISTRATIVE | Facility: HOSPITAL | Age: 78
End: 2023-06-02
Payer: COMMERCIAL

## 2023-06-02 ENCOUNTER — CLINICAL SUPPORT (OUTPATIENT)
Dept: REHABILITATION | Facility: HOSPITAL | Age: 78
End: 2023-06-02
Attending: INTERNAL MEDICINE
Payer: MEDICARE

## 2023-06-02 DIAGNOSIS — R42 DIZZINESS: ICD-10-CM

## 2023-06-02 DIAGNOSIS — R42 VERTIGO: ICD-10-CM

## 2023-06-02 PROCEDURE — 97161 PT EVAL LOW COMPLEX 20 MIN: CPT | Mod: PO

## 2023-06-02 NOTE — PLAN OF CARE
"[facility]  Physical Therapy Neurological Rehabilitation Initial Evaluation    Name: Loni Heard  Clinic Number: [id number]    Therapy Diagnosis:   Encounter Diagnoses   Name Primary?    Vertigo     Dizziness      Physician: Mariaelena Mallory*    Physician Orders: PT Eval and Treat "Vestibular Therapy "  Medical Diagnosis from Referral: R42 (ICD-10-CM) - Vertigo   Evaluation Date: 6/2/2023  Insurance Authorization Period: 05/23/2023 - 05/22/2024   Plan of Care Expiration: 06/30/2023  Visit # / Visits authorized: 01/ 01 pending additional authorization     Time In: 0925  Time Out: 1015  Total Billable Time: 50 minutes    Precautions: Standard, Hx of breast cancer    Subjective   Date of onset: ~6 weeks   History of current condition - Mario reports: she returned from [place] and had a bad cold. She was feeling better but had an exacerbation while trying to swim. Again, she had been feeling better, but had another exacerbation while getting up from a massage about 2 weeks ago (lasted hours).  She is currently swimming as tolerated.   Triggers: positional, swimming   Description of symptoms: spinning   Duration of symptoms: vertigo episode lasted about 2 hours following massage    Visual changes: None   Hearing Changes (hearing loss or tinnitus): None   Recent history of upper respiratory infection or GI infection: Yes   Currently taking Meclizine: None     Pts goals: Eliminate the dizziness     Systems screening  Cardiovascular indicators: Hypertension, Hypercholesteremia, and Diabetes    Is patient currently taking medication for stated indicators: Does not monitor blood pressure regularly, takes medication for cholesterol and diabetes  Neurological:  None    Medical History:   Past Medical History:   Diagnosis Date    Allergy     hx rhinitis     Asthma     childhood asthma     Atrophic gastritis     B12 deficiency     Cancer     left breast invasive ductal carcinoma     " Cataract     Depression     Diabetes mellitus type II     Hyperlipidemia     Hypertension     Mild vitamin D deficiency     Pulmonary nodule     micronodule 8/2017        Surgical History:   Loni Heard  has a past surgical history that includes Tonsillectomy; left ankle fracture and repair/orif ; neuroma removed; foreign body removed upper airway ; eyelid cyst removed; Breast surgery (Left, 07/2018); and Upper gastrointestinal endoscopy.    Medications:   Loni has a current medication list which includes the following prescription(s): amlodipine, b complex-vitamin c-folic acid, blood sugar diagnostic, blood-glucose meter, cholecalciferol (vitamin d3), coenzyme q10, cyanocobalamin, estradiol, lancets, letrozole, magnesium, rosuvastatin, sitagliptin phosphate, syringe with needle, safety, vit c/e/zn/coppr/lutein/zeaxan, and vitamin k2, and the following Facility-Administered Medications: cyanocobalamin.    Allergies:   Review of patient's allergies indicates:   Allergen Reactions    Codeine Anaphylaxis     Other reaction(s): Anaphylaxis    Penicillins      Other reaction(s): dizzy     Niacin preparations Other (See Comments)     Flushing/lips swelling         Imaging: No recent relevant     Vestibular Function Testing/Audiogram: None at this time    Prior Therapy: No prior vestibular PT  Social History: Lives with roommate   Falls: None   DME: None    Home Environment: Single story house   Exercise Routine / History: Swimming, walking  Family Present at time of Eval: No   Occupation: Student at [employer]  Prior Level of Function: Independent with ADLs, functional mobility, recreational activities   Current Level of Function:  Independent with ADLs, functional mobility, recreational activities     Pain: No complaints of pain     Objective   Outcome Measure:   Dizziness Handicap Inventory: 10 - Mild    SYSTEMS SCREEN    - Follows commands: 100% of time   - Speech: no deficits     Mental status: normal mood,  behavior, speech, dress, motor activity, and thought processes  Appearance: Casually dressed  Behavior:  calm and cooperative  Attention Span and Concentration:  Normal    Posture Alignment: WFL     UPPER EXTREMITY--AROM/PROM  (R) UE: WFLs  (L) UE: WFLs         RANGE OF MOTION--LOWER EXTREMITIES  (R) LE Hip: WFL   Knee: WFL   Ankle: WFL    (L) LE: Hip: WFL   Knee: WFL   Ankle: WFL    Strength: manual muscle testing not performed    ROM:   CERVICAL SPINE  Flexion: WNL, mild dizziness   Extension: WNL . Mild dizziness  L side bend: limited  R side bend: WFL   L rotation: WFL  R rotation: WFL   Are concurrent symptoms present with any of these movements: As noted above     Modified VAS (Vertebral Artery Screen), in sitting (rotation, then extension):  R: Negative  L: Negative    VESTIBULAR EXAMINATION    Oculomotor Screen in room light (fixation present):   Known eye dysfunction: None   Ocular ROM: WNL, mild dizziness    Tracking/Smooth Pursuits: Intact  Saccades: Intact  Convergence: DNT  Spontaneous Nystagmus: Absent   Gaze Holding Nystagmus: Absent     Slow VOR Screen: WNL  VOR Cancellation: DNT  Head Thrust Test: Consistent loss of gaze with L thrust    Oculomotor Screen with fixation suppressed (Infrared goggles):  Spontaneous Nystagmus: Absent   Gaze Holding Nystagmus: Absent   Head Shaking Nystagmus: Absent     Dynamic Visual Acuity: 3 line loss    POSITIONAL CANAL TESTING  Looking for nystagmus (slow phase followed by quick phase to the affected side for BPPV)    Wes Hallpike (posterior / CL anterior)   Right : Negative nystagmus, positive dizziness - dizziness upon return to sitting   Left: Negative nystagmus, Positive dizziness - dizziness upon return to sitting  Horizontal Canals   Right: Negative nystagmus, Negative dizziness - dizziness upon return to sitting   Left: Negative nystagmus, Negative dizziness  - dizziness upon return to sitting  Treatment Performed: Not indicated    Functional Gait Assessment:    1. Gait on level surface =  3   (3) Normal: less than 5.5 sec, no A.D., no imbalance, normal gait pattern, deviates< 6in   (2) Mild impairment: 7-5.6 sec, uses A.D., mild gait deviations, or deviates 6-10 in   (1) Moderate impairment: > 7 sec, slow speed, imbalance, deviates 10-15 in.   (0) Severe impairment: needs assist, deviates >15 in, reach/touch wall  2. Change in Gait Speed = 3   (3) Normal: smooth change w/o loss of balance or gait deviation, deviates < 6 in, significant difference between speeds   (2) Mild impairment: changes speed, but demonstrates mild gait deviations, deviates 6-10 in, OR no deviations but unable to significantly speed, OR uses A.D.   (1) Moderate impairment: minor changes to speed, OR changes speed w/ significant deviations, deviates 10-15 in, OR  Changes speed , but loses balance & recovers   (0) Severe impairment: cannot change speed, deviates >15 in, or loses balance & needs assist  3. Gait with horizontal head turns  = 3   (3) Normal: no change in gait, deviates <6 in   (2) Mild impairment: slight change in speed, deviates 6-10 in, OR uses A.D.   (1) Moderate impairment: moderate change in speed, deviates 10-15 in   (0) Severe impairment: severe disruption of gait, deviates >15in  4. Gait with vertical head turns = 3   (3) Normal: no change in gait, deviates <6 in   (2) Mild impairment: slight change in speed, deviates 6-10 in OR uses A.D.   (1) Moderate impairment: moderate change in speed, deviates 10-15 in   (0) Severe impairment: severe disruption of gait, deviates >15 in  5. Gait with pivot turns = 3   (3) Normal: performs safely in 3 sec, no LOB   (2) Mild impairment: performs in >3 sec & no LOB, OR turns safely & requires several steps to regain LOB   (1) Moderate impairment: turns slow, OR requires several small steps for balance following turn & stop   (0) Severe impairment: cannot turn safely, needs assist  6. Step over obstacle = 3   (3) Normal: steps over 2 stacked  boxes w/o change in speed or LOB   (2) Mild impairment: able to step over 1 box w/o change in speed or LOB   (1) Moderate impairment: steps over 1 box but must slow down, may require VC   (0) Severe impairment: cannot perform w/o assist  7. Gait with Narrow MAGDY = 2   (3) Normal: 10 steps no staggering   (2) Mild impairment: 7-9 steps   (1) Moderate impairment: 4-7 steps   (0) Severe impairment: < 4 steps or cannot perform w/o assist  8. Gait with eyes closed = 2   (3) Normal: < 7 sec, no A.D., no LOB, normal gait pattern, deviates <6 in   (2) Mild impairment: 7.1-9 sec, mild gait deviations, deviates 6-10 in   (1) Moderate impairment: > 9 sec, abnormal pattern, LOB, deviates 10-15 in   (0) Severe impairment: cannot perform w/o assist, LOB, deviates >15in  9. Ambulating Backwards = 2   (3) Normal: no A.D., no LOB, normal gait pattern, deviates <6in   (2) Mild impairment: uses A.D., slower speed, mild gait deviations, deviates 6-10 in   (1) Moderate impairment: slow speed, abnormal gait pattern, LOB, deviates 10-15 in   (0) Severe impairment: severe gait deviations or LOB, deviates >15in  10. Steps = 3   (3) Normal: alternating feet, no rail   (2) Mild Impairment: alternating feet, uses rail   (1) Moderate impairment: step-to, uses rail   (0) Severe impairment: cannot perform safely    Score 27/30     Score:   <22/30 fall risk   <20/30 fall risk in older adults   <18/30 fall risk in Parkinsons        Postural control: MCTSIB: Evaluation 06/02/2023 (Average of 3 trials)   1. Eyes Open/feet together/Firm: 30 seconds   2. Eyes Closed/feet together/Firm:  30 seconds   3. Eyes Open/feet together/Foam:  30 seconds   4. Eyes Closed/feet together/Foam: 23 seconds   TOTAL 113/120     CMS Impairment/Limitation/Restriction for FOTO Vestibular Survey    Therapist reviewed FOTO scores for Loni Heard on 6/2/2023.   FOTO documents entered into [employer] - see Media section.    Limitation Score: 62%  Category: Mobility    "    TREATMENT   No treatment provided today. All time spent on evaluation.     Home Exercises and Patient Education Provided    Education provided: Examination findings, POC, scheduling, goals of therapy    Written Home Exercises Provided: No. To be established at initial follow up session.     Assessment   Loni is a 77 y.o. female referred to outpatient Physical Therapy with a medical diagnosis of Vertigo. Patient presents with a chief complaint of residual dizziness following vertigo episodes triggered by swimming and getting of a massage table. She had experienced a "bad cold" prior to onset of vertigo symptoms. Patient was screened for impairments of cervical range, oculomotor function, gaze stability, balance, and BPPV. Cervical range was functional, but with some reports of dizziness throughout. No abnormalities noted with oculomotor screen, but she did report increased dizziness with tracking the target. She did exhibit mild impairment of gaze stability as indicated by Dynamic Visual Acuity and Head Thrust tests, indicating impaired VOR consistent with vestibular dysfunction. She performed dynamic balance testing well, but was slightly challenged with vestibular-biased static balance testing (vision-eliminated on a compliant surface). Positional testing was negative of each canal bilaterally, suggestive negative BPPV at this time; however, Mario did report dizziness in some positions as well as returning to upright position suggesting some motion sensitivity. Today's examination is somewhat consistent with incompletely compensated vestibular dysfunction, possibly of the L side. She will benefit from vestibular PT to decrease motion sensitivity and improve balance for return to PLOF.     Pt prognosis is Good.   Pt will benefit from skilled outpatient Physical Therapy to address the deficits stated above and in the chart below, provide pt/family education, and to maximize pt's level of independence. "     Plan of care discussed with patient: Yes   Pt's spiritual, cultural and educational needs considered and patient is agreeable to the plan of care and goals as stated below:     Anticipated Barriers for therapy: None foreseen    Medical Necessity is demonstrated by the following  History  Co-morbidities and personal factors that may impact the plan of care [] LOW: no personal factors / comorbidities  [] MODERATE: 1-2 personal factors / comorbidities  [x] HIGH: 3+ personal factors / comorbidities    Moderate / High Support Documentation:   Co-morbidities:    Back pain, Diabetes Type I or II, HTN, hyperlipidemia    Personal Factors:   no deficits     Examination  Body Structures and Functions, activity limitations and participation restrictions that may impact the plan of care [] LOW: addressing 1-2 elements  [] MODERATE: 3+ elements  [x] HIGH: 3+ elements (please support below)    Moderate / High Support Documentation:   Body Regions:   head    Body Systems:    Vestibular    Participation Restrictions:   Tolerance to recreational swimming    Activity limitations:   Learning and applying knowledge  no deficits    General Tasks and Commands  no deficits    Communication  no deficits    Mobility  no deficits    Self care  no deficits    Domestic Life  no deficits    Interactions/Relationships  no deficits    Life Areas  no deficits    Community and Social Life  no deficits     Clinical Presentation [x] LOW: stable  [] MODERATE: Evolving  [] HIGH: Unstable     Decision Making/ Complexity Score: low       Goals:  Long Term Goals (LTG), 4 weeks.   Pt agrees to goals set. Eval date: 06/02/2023     Status   LTG: Pt will achieve a score of 0 on the Dizziness Handicap Inventory to increase functional mobility 10 Ongoing   LTG: Pt will score a 30/30 on the Functional gait assessment to increase functional mobility and dynamic balance while walking on campus 27/30 Ongoing   LTG: Pt will score 120/120 on the MCTSIB to increase  static balance 113/120 Ongoing   LTG: Patient will exhibit </= 2 line loss with Dynamic Visual Acuity testing, indicating improved gaze stabilization. 2 line loss Ongoing   LTG: Patient will be independent with HEP emphasizing gaze stabilization. Education required Ongoing       Plan   Plan of care Certification: 6/2/2023 to 06/30/2023.    Outpatient Physical Therapy 1 times weekly for 4 weeks to include the following interventions: Neuromuscular Re-ed, Patient Education, Self Care, Therapeutic Activities, and Therapeutic Exercise.     Next visit: HEP education for VORx1 and gait with head turns    Noemí Brown PT

## 2023-06-05 ENCOUNTER — CLINICAL SUPPORT (OUTPATIENT)
Dept: REHABILITATION | Facility: HOSPITAL | Age: 78
End: 2023-06-05
Payer: MEDICARE

## 2023-06-05 DIAGNOSIS — R42 DIZZINESS: Primary | ICD-10-CM

## 2023-06-05 PROCEDURE — 97530 THERAPEUTIC ACTIVITIES: CPT | Mod: PO

## 2023-06-05 PROCEDURE — 97112 NEUROMUSCULAR REEDUCATION: CPT | Mod: PO

## 2023-06-05 NOTE — PROGRESS NOTES
"[facility]   Physical Therapy Treatment Note      Name: Loni Heard  Clinic Number: [id number]    Therapy Diagnosis:   Encounter Diagnosis   Name Primary?    Dizziness Yes     Physician: Mariaelena Mallory*    Visit Date: 6/5/2023    Physician Orders: PT Eval and Treat "Vestibular Therapy "  Medical Diagnosis from Referral: R42 (ICD-10-CM) - Vertigo   Evaluation Date: 6/2/2023  Insurance Authorization Period: 12/31/23  Plan of Care Expiration: 06/30/2023  Visit # / Visits authorized: 01/ 20 + eval     Time In: 09:42  Time Out: 10:21  Total Billable Time: 39 minutes     Precautions: Standard, Hx of breast cancer    Subjective     Pt reports: that her dizziness is doing fine this morning. She went swimming over the weekend-usually swims a quarter of a mile and felt a little light headed after.     Home exercise program provided this date.   Response to previous treatment: no adverse effects  Functional change: ongoing    Pain: 0/10  Location: NA    Objective      Objective Measures updated at progress report unless specified.     Treatment     Mario received the treatments listed below:      neuromuscular re-education activities to improve: Balance, Sense, and vestibular function for 29 minutes. The following activities were included:  Gaze Stabilization:  VOR 1: X target  Horizontal:  1 x 30" at 90 bpm, slight headache  2 x 30" at 100 bpm, increase in dizziness  Vertical:   3 x 30" at 90 bpm, visual slippage, dizziness    Balance Training:    2 x 30" each lower extremity single leg stance with alternate lower extremity on mini BOSU, soft side down, eyes closed, CGA    2 point rocker board:  X 60" right <>left weight shifting, eyes open, no upper extremity support, CGA  X 60" right <>left weight shifting, eyes closed, occ upper extremity support, CGA  X 60" A<>P weight shifting, eyes open, no upper extremity support, CGA  X 60" A<>P weight shifting, eyes closed, occ upper " "extremity support, CGA    therapeutic activities to improve functional performance for 10  minutes, including:  Time spent education patient on vestibular system, vestibular therapy, and purpose of vestibular interventions.     Functional motion tolerance training:  2 x 30" each side Goncalvesjavon Gutierrez for motion tolerance training      Patient Education and Home Exercises       Education provided:   - plan of care (POC), HEP    Written Home Exercises Provided: yes. Exercises were reviewed and Mario was able to demonstrate them prior to the end of the session.  Mario demonstrated good  understanding of the education provided. See EMR under Patient Instructions for exercises provided during therapy session on 06/05/23    Assessment     Patient tolerated therapy session well this morning. Home exercise program provided for gaze stabilization and motion tolerance training. Patient demonstrates good understanding of home exercise program exercises. Improved performance noted as balance exercises progressed. No significant increase in symptoms post intervention. Continue plan of care (POC) to progress remaining vestibular deficits.     Mario Is progressing well towards her goals.   Pt prognosis is Good.     Pt will continue to benefit from skilled outpatient physical therapy to address the deficits listed in the problem list box on initial evaluation, provide pt/family education and to maximize pt's level of independence in the home and community environment.     Pt's spiritual, cultural and educational needs considered and pt agreeable to plan of care and goals.     Anticipated barriers to physical therapy: none foreseen    Goals:  Long Term Goals (LTG), 4 weeks.   Pt agrees to goals set. Eval date: 06/02/2023       Status   LTG: Pt will achieve a score of 0 on the Dizziness Handicap Inventory to increase functional mobility 10 Ongoing   LTG: Pt will score a 30/30 on the Functional gait assessment to increase functional " mobility and dynamic balance while walking on campus 27/30 Ongoing   LTG: Pt will score 120/120 on the MCTSIB to increase static balance 113/120 Ongoing   LTG: Patient will exhibit </= 2 line loss with Dynamic Visual Acuity testing, indicating improved gaze stabilization. 2 line loss Ongoing   LTG: Patient will be independent with HEP emphasizing gaze stabilization. Education required Ongoing           Plan     Check home exercise program. Progress motion tolerance training as able.     Basilia Watson, PT

## 2023-06-05 NOTE — PATIENT INSTRUCTIONS
Resource: [employer] [place] [employer]       To do the Goncalves-Matt exercise: for motion tolerance  Start in an upright, seated position.  Move into the lying position on one side with your nose pointed up at about a 45-degree angle.  Remain in this position for about 30 seconds (or until the vertigo subsides, whichever is longer), then move back to the seated position.

## 2023-06-12 ENCOUNTER — CLINICAL SUPPORT (OUTPATIENT)
Dept: REHABILITATION | Facility: HOSPITAL | Age: 78
End: 2023-06-12
Payer: MEDICARE

## 2023-06-12 DIAGNOSIS — R42 DIZZINESS: Primary | ICD-10-CM

## 2023-06-12 PROCEDURE — 97112 NEUROMUSCULAR REEDUCATION: CPT | Mod: PO

## 2023-06-12 PROCEDURE — 97530 THERAPEUTIC ACTIVITIES: CPT | Mod: PO

## 2023-06-12 NOTE — PROGRESS NOTES
"[facility]   Physical Therapy Treatment Note      Name: Loni Heard  Clinic Number: [id number]    Therapy Diagnosis:   Encounter Diagnosis   Name Primary?    Dizziness Yes       Physician: Mariaelena Mallory*    Visit Date: 6/12/2023    Physician Orders: PT Eval and Treat "Vestibular Therapy "  Medical Diagnosis from Referral: R42 (ICD-10-CM) - Vertigo   Evaluation Date: 6/2/2023  Insurance Authorization Period: 12/31/23  Plan of Care Expiration: 06/30/2023  Visit # / Visits authorized: 02/ 20 + eval     Time In: 09:30  Time Out: 10:15  Total Billable Time: 45 minutes     Precautions: Standard, Hx of breast cancer    Subjective     Pt reports: that she is doing fine this morning. Her right knee (from prior medial meniscus injury) was irritated following rocker board activity last session. It is feeling fine today to start. She was able to swim last week without dizziness.     She has been somewhat compliant with home exercise program.   Response to previous treatment: no adverse effects  Functional change: ongoing    Pain: 0/10  Location: NA    Objective      Objective Measures updated at progress report unless specified.     Treatment     Mario received the treatments listed below:      neuromuscular re-education activities to improve: Balance, Sense, and vestibular function for 10 minutes. The following activities were included:  Gaze Stabilization:  VOR 1: X target  Horizontal:  3 x 30" at 100 bpm, slight increase in dizziness  Vertical:   3 x 30" at 90 bpm, occ visual slippage, dizziness    Balance Training:  NP      therapeutic activities to improve functional performance for 35 minutes, including:    Ambulation in hallway for functional motion tolerance: SBA  2 x 100 feet, RUQ <>LLQ diagonal head movements, dizziness and mod unsteadiness  2 x 100 feet, LUQ <>RLQ diagonal head movements, slight dizziness and min unsteadiness    Seated cone transfer:  X 6 cones, transfer " "from ground <> upper opposite diagonal both directions, min dizziness when transferring to upper right quadrant    Functional motion tolerance training:  3 x 30" each side Sacha Gutierrez for motion tolerance training- more pronounced upon rising    Patient Education and Home Exercises       Education provided:   - plan of care (POC), HEP    Written Home Exercises Provided: yes. Exercises were reviewed and Mario was able to demonstrate them prior to the end of the session.  Mario demonstrated good  understanding of the education provided. See EMR under Patient Instructions for exercises provided during therapy session on 06/05/23    Assessment     Patient tolerated therapy session well this morning. Therapy session increased focus on motion tolerance training, specifically in diagonal patterns since this motion remains most provocative for symptoms. During Goncalves Daroff exercises, increase in symptoms mainly noted with rising motion. Only slight increase in symptoms post intervention. Continue plan of care (POC) to progress remaining vestibular deficits.     Mario Is progressing well towards her goals.   Pt prognosis is Good.     Pt will continue to benefit from skilled outpatient physical therapy to address the deficits listed in the problem list box on initial evaluation, provide pt/family education and to maximize pt's level of independence in the home and community environment.     Pt's spiritual, cultural and educational needs considered and pt agreeable to plan of care and goals.     Anticipated barriers to physical therapy: none foreseen    Goals:  Long Term Goals (LTG), 4 weeks.   Pt agrees to goals set. Eval date: 06/02/2023       Status   LTG: Pt will achieve a score of 0 on the Dizziness Handicap Inventory to increase functional mobility 10 Ongoing   LTG: Pt will score a 30/30 on the Functional gait assessment to increase functional mobility and dynamic balance while walking on campus 27/30 Ongoing   LTG: Pt " will score 120/120 on the MCTSIB to increase static balance 113/120 Ongoing   LTG: Patient will exhibit </= 2 line loss with Dynamic Visual Acuity testing, indicating improved gaze stabilization. 2 line loss Ongoing   LTG: Patient will be independent with HEP emphasizing gaze stabilization. Education required Ongoing           Plan     Check home exercise program. Progress motion tolerance training as able.     Basilia Watson, PT

## 2023-06-19 ENCOUNTER — CLINICAL SUPPORT (OUTPATIENT)
Dept: REHABILITATION | Facility: HOSPITAL | Age: 78
End: 2023-06-19
Payer: MEDICARE

## 2023-06-19 DIAGNOSIS — R42 DIZZINESS: Primary | ICD-10-CM

## 2023-06-19 PROCEDURE — 97112 NEUROMUSCULAR REEDUCATION: CPT | Mod: PO

## 2023-06-19 PROCEDURE — 97530 THERAPEUTIC ACTIVITIES: CPT | Mod: PO

## 2023-06-19 NOTE — PROGRESS NOTES
"[facility]   Physical Therapy Treatment Note      Name: Loni Heard  Clinic Number: [id number]    Therapy Diagnosis:   Encounter Diagnosis   Name Primary?    Dizziness Yes     Physician: Mariaelena Mallory*    Visit Date: 6/19/2023    Physician Orders: PT Eval and Treat "Vestibular Therapy "  Medical Diagnosis from Referral: R42 (ICD-10-CM) - Vertigo   Evaluation Date: 6/2/2023  Insurance Authorization Period: 12/31/23  Plan of Care Expiration: 06/30/2023  Visit # / Visits authorized: 03/ 20 + eval     Time In: 0930  Time Out: 1012  Total Billable Time: 42 minutes     Precautions: Standard, Hx of breast cancer    Subjective     Pt reports: that she is doing fine this morning. Her right knee is still slight bothersome but feels better than how it was last session    She has been somewhat compliant with home exercise program.   Response to previous treatment: no adverse effects  Functional change: ongoing    Pain: 0/10  Location: NA    Objective      Objective Measures updated at progress report unless specified.     Treatment     Mario received the treatments listed below:      neuromuscular re-education activities to improve: Balance, Sense, and vestibular function for 10 minutes. The following activities were included:  Gaze Stabilization:  VOR 1: X target  Horizontal:  3 x 30" at 100 bpm,   Vertical:   3 x 30" at 90 bpm, occ visual slippage, slight increase in dizziness    Balance Training:  NP      therapeutic activities to improve functional performance for 32 minutes, including:    Ambulation in hallway for functional motion tolerance: SBA  2  x 100 feet , Vertical head movements, slight dizziness  2 x 100 feet, RUQ <>LLQ diagonal head movements, dizziness and mod unsteadiness  2 x 100 feet, LUQ <>RLQ diagonal head movements, slight dizziness and min unsteadiness    Standing cone transfer:  X 10 cones, transfer from ground <> upper opposite diagonal both directions, min " "dizziness when transferring to upper right quadrant    Standing pin transfer   X 6 yellow pins,  transfer from black chair <> upper opposite diagonal of the board, Both directions     Functional motion tolerance training:  3 x 30" each side Sacha Gutierrez for motion tolerance training- slight dizziness upon siting up, less provocative and disappears less than 5 seconds       Patient Education and Home Exercises       Education provided:   - plan of care (POC), HEP    Written Home Exercises Provided: yes. Exercises were reviewed and Mario was able to demonstrate them prior to the end of the session.  Mario demonstrated good  understanding of the education provided. See EMR under Patient Instructions for exercises provided during therapy session on 06/05/23    Assessment     Patient tolerated therapy session well this morning. Patient reported improvement during horizontal head movements in which she didn't experience any symptoms .Therapy session continue to focus on motion tolerance training, specifically in wider diagonal ranges. Decreased symptoms during Goncalves Daroff exercises indicating much improved motion tolerance. PT discussed with pt about potential discharge next visit if symptoms continues improvements. Continue plan of care (POC) to progress remaining vestibular deficits.     Mario Is progressing well towards her goals.   Pt prognosis is Good.     Pt will continue to benefit from skilled outpatient physical therapy to address the deficits listed in the problem list box on initial evaluation, provide pt/family education and to maximize pt's level of independence in the home and community environment.     Pt's spiritual, cultural and educational needs considered and pt agreeable to plan of care and goals.     Anticipated barriers to physical therapy: none foreseen    Goals:  Long Term Goals (LTG), 4 weeks.   Pt agrees to goals set. Eval date: 06/02/2023       Status   LTG: Pt will achieve a score of 0 on the " Dizziness Handicap Inventory to increase functional mobility 10 Ongoing   LTG: Pt will score a 30/30 on the Functional gait assessment to increase functional mobility and dynamic balance while walking on campus 27/30 Ongoing   LTG: Pt will score 120/120 on the MCTSIB to increase static balance 113/120 Ongoing   LTG: Patient will exhibit </= 2 line loss with Dynamic Visual Acuity testing, indicating improved gaze stabilization. 2 line loss Ongoing   LTG: Patient will be independent with HEP emphasizing gaze stabilization. Education required Ongoing           Plan     Discuss further therapy needs.     I certify that I was present in the room directing the student in service delivery and guiding them using my skilled judgment. As the co-signing therapist I have reviewed the students documentation and am responsible for the treatment, assessment, and plan.     Basilia Watson, PT

## 2023-06-20 ENCOUNTER — PATIENT MESSAGE (OUTPATIENT)
Dept: ORTHOPEDICS | Facility: CLINIC | Age: 78
End: 2023-06-20
Payer: COMMERCIAL

## 2023-06-30 ENCOUNTER — CLINICAL SUPPORT (OUTPATIENT)
Dept: REHABILITATION | Facility: HOSPITAL | Age: 78
End: 2023-06-30
Payer: MEDICARE

## 2023-06-30 DIAGNOSIS — R42 DIZZINESS: Primary | ICD-10-CM

## 2023-06-30 PROCEDURE — 97112 NEUROMUSCULAR REEDUCATION: CPT | Mod: PO

## 2023-06-30 NOTE — PLAN OF CARE
PHYSICAL THERAPY ASSESSMENT & DISCHARGE SUMMARY     Mario participated in vestibular PT for ~1 month. At onset pf plan of care, patient's symptoms had already resolved to a point of only minimal impact on her daily functions. Vestibular PT emphasized gaze stabilization training, balance training, and motion tolerance training. She currently reports resolution of dizziness, stating that she has no issues with swimming and social activities with friends. She feels that she is ready to discharge at this time, which PT also finds appropriate. She was provided with an updated HEP to continue gaze stabilization training.     Long Term Goals (LTG), 4 weeks.   Pt agrees to goals set. Eval date: 06/02/2023 06/30/2023 Status   LTG: Pt will achieve a score of 0 on the Dizziness Handicap Inventory to increase functional mobility 10 8 Improved, not met   LTG: Pt will score a 30/30 on the Functional gait assessment to increase functional mobility and dynamic balance while walking on campus 27/30 29/30 Improved, not met   LTG: Pt will score 120/120 on the MCTSIB to increase static balance 113/120 120/120 Met 06/30/2023   LTG: Patient will exhibit </= 2 line loss with Dynamic Visual Acuity testing, indicating improved gaze stabilization. 3 line loss 3 line loss Not met   LTG: Patient will be independent with HEP emphasizing gaze stabilization. Education required Updated and reviewed Met 06/30/2023        Discharge reason: Resolution of symptoms    PLAN   This patient is discharged from Outpatient Physical Therapy Services.     Noemí Brown, PT  06/30/2023

## 2023-06-30 NOTE — PROGRESS NOTES
"[facility]   Physical Therapy Treatment Note      Name: Loni eHard  Clinic Number: [id number]    Therapy Diagnosis:   Encounter Diagnosis   Name Primary?    Dizziness Yes       Physician: Mariaelena Mallory*    Visit Date: 6/30/2023    Physician Orders: PT Eval and Treat "Vestibular Therapy "  Medical Diagnosis from Referral: R42 (ICD-10-CM) - Vertigo   Evaluation Date: 6/2/2023  Insurance Authorization Period: 12/31/23  Plan of Care Expiration: 06/30/2023  Visit # / Visits authorized: 04/ 20 + eval     Time In: 0700  Time Out: 0725  Total Billable Time: 25 minutes     Precautions: Standard, Hx of breast cancer    Subjective     Pt reports: that she is doing well. She reports no dizziness with her functional tasks, including swimming. She is engaging with friends and feels that she is back to her prior state.     She has been somewhat compliant with home exercise program.   Response to previous treatment: no adverse effects  Functional change: ongoing    Pain: 0/10  Location: NA    Objective      Functional Gait Assessment:   1. Gait on level surface =  3  2. Change in Gait Speed = 3  3. Gait with horizontal head turns  = 2  4. Gait with vertical head turns = 3  5. Gait with pivot turns = 3  6. Step over obstacle = 3  7. Gait with Narrow MAGDY = 3  8. Gait with eyes closed = 3  9. Ambulating Backwards = 3  10. Steps = 3    Score 29/30        Postural control: MCTSIB: Evaluation 06/30/2023 (Average of 3 trials)   1. Eyes Open/feet together/Firm: 30 seconds   2. Eyes Closed/feet together/Firm:  30 seconds   3. Eyes Open/feet together/Foam:  30 seconds   4. Eyes Closed/feet together/Foam:  30 seconds   TOTAL 120/120         Long Term Goals (LTG), 4 weeks.   Pt agrees to goals set. Eval date: 06/02/2023 06/30/2023   LTG: Pt will achieve a score of 0 on the Dizziness Handicap Inventory to increase functional mobility 10 8   LTG: Pt will score a 30/30 on the Functional gait " assessment to increase functional mobility and dynamic balance while walking on campus 27/30 29/30   LTG: Pt will score 120/120 on the MCTSIB to increase static balance 113/120 120/120   LTG: Patient will exhibit </= 2 line loss with Dynamic Visual Acuity testing, indicating improved gaze stabilization. 3 line loss 3 line loss   LTG: Patient will be independent with HEP emphasizing gaze stabilization. Education required Updated and reviewed        Treatment     Mario received the treatments listed below:      neuromuscular re-education activities to improve: Balance, Sense, and vestibular function for 25 minutes. The following activities were included:    Includes time for objective measurements and HEP review. See above.     therapeutic activities to improve functional performance for 0 minutes, including:        Patient Education and Home Exercises       Education provided:   - plan of care (POC), HEP    Written Home Exercises Provided: yes. Exercises were reviewed and Mario was able to demonstrate them prior to the end of the session.  Mario demonstrated good  understanding of the education provided. See EMR under Patient Instructions for exercises provided during therapy session on 06/05/23      PHYSICAL THERAPY ASSESSMENT & DISCHARGE SUMMARY     Mario participated in vestibular PT for ~1 month. At onset pf plan of care, patient's symptoms had already resolved to a point of only minimal impact on her daily functions. Vestibular PT emphasized gaze stabilization training, balance training, and motion tolerance training. She currently reports resolution of dizziness, stating that she has no issues with swimming and social activities with friends. She feels that she is ready to discharge at this time, which PT also finds appropriate. She was provided with an updated HEP to continue gaze stabilization training.     Long Term Goals (LTG), 4 weeks.   Pt agrees to goals set. Eval date: 06/02/2023 06/30/2023 Status    LTG: Pt will achieve a score of 0 on the Dizziness Handicap Inventory to increase functional mobility 10 8 Improved, not met   LTG: Pt will score a 30/30 on the Functional gait assessment to increase functional mobility and dynamic balance while walking on campus 27/30 29/30 Improved, not met   LTG: Pt will score 120/120 on the MCTSIB to increase static balance 113/120 120/120 Met 06/30/2023   LTG: Patient will exhibit </= 2 line loss with Dynamic Visual Acuity testing, indicating improved gaze stabilization. 3 line loss 3 line loss Not met   LTG: Patient will be independent with HEP emphasizing gaze stabilization. Education required Updated and reviewed Met 06/30/2023        Discharge reason: Resolution of symptoms    PLAN   This patient is discharged from Outpatient Physical Therapy Services.     Noemí Brown, PT  06/30/2023

## 2023-07-03 PROBLEM — R42 DIZZINESS: Status: RESOLVED | Noted: 2023-06-02 | Resolved: 2023-07-03

## 2023-07-06 ENCOUNTER — IMMUNIZATION (OUTPATIENT)
Dept: INTERNAL MEDICINE | Facility: CLINIC | Age: 78
End: 2023-07-06
Payer: MEDICARE

## 2023-07-06 DIAGNOSIS — Z23 NEED FOR VACCINATION: Primary | ICD-10-CM

## 2023-07-06 PROCEDURE — 91312 COVID-19, MRNA, LNP-S, BIVALENT BOOSTER, PF, 30 MCG/0.3 ML DOSE: CPT | Mod: PBBFAC

## 2023-07-06 PROCEDURE — 0124A COVID-19, MRNA, LNP-S, BIVALENT BOOSTER, PF, 30 MCG/0.3 ML DOSE: CPT | Mod: PBBFAC,CV19

## 2023-07-19 ENCOUNTER — PES CALL (OUTPATIENT)
Dept: ADMINISTRATIVE | Facility: CLINIC | Age: 78
End: 2023-07-19
Payer: COMMERCIAL

## 2023-07-24 ENCOUNTER — DOCUMENTATION ONLY (OUTPATIENT)
Dept: PHARMACY | Facility: CLINIC | Age: 78
End: 2023-07-24
Payer: COMMERCIAL

## 2023-07-24 NOTE — PROGRESS NOTES
Patient received IM Cyanocobalamin 1,000 mcg/mL to the right deltoid on 07/24/2023      Lot Number: [id number]  Expiration Date: 07/31/2024  BY Yanet Menard PharmD

## 2023-07-26 ENCOUNTER — PATIENT OUTREACH (OUTPATIENT)
Dept: ADMINISTRATIVE | Facility: HOSPITAL | Age: 78
End: 2023-07-26
Payer: COMMERCIAL

## 2023-08-15 NOTE — TELEPHONE ENCOUNTER
Refill Decision Note   Loni Heard  is requesting a refill authorization.  Brief Assessment and Rationale for Refill:  Approve     Medication Therapy Plan:  FLOS 09/22/23      Comments:     Note composed:6:40 AM 08/15/2023

## 2023-08-15 NOTE — TELEPHONE ENCOUNTER
Care Due:                  Date            Visit Type   Department     Provider  --------------------------------------------------------------------------------                                EP -                              [facility] INTERNAL  Mariaelena Lopez  Last Visit: 05-      CARE ([facility])   MEDICINE       Mohamud                              [facility] INTERNAL  Mariaelena Lopez  Next Visit: 09-      CARE ([facility])   MEDICINE       Mohamud                                                            Last  Test          Frequency    Reason                     Performed    Due Date  --------------------------------------------------------------------------------    HBA1C.......  6 months...  SITagliptin..............  05- 11-    [facility] Embedded Care Due Messages. Reference number: [id number].   8/15/2023 3:36:08 AM CDT

## 2023-08-18 ENCOUNTER — DOCUMENTATION ONLY (OUTPATIENT)
Dept: PHARMACY | Facility: CLINIC | Age: 78
End: 2023-08-18
Payer: COMMERCIAL

## 2023-08-18 NOTE — PROGRESS NOTES
Patient received IM Cyanocobalamin 1,000 mcg/mL to the RD on 08/18/2023.      Lot Number: [id number]  Expiration Date: 07/31/2024  Rx#: [id number]      Saige Jesus, PharmD, CSP [facility]  P: [phone]  F: [phone]  Monday thru Friday 8am-5:30pm

## 2023-09-05 ENCOUNTER — PATIENT MESSAGE (OUTPATIENT)
Dept: INTERNAL MEDICINE | Facility: CLINIC | Age: 78
End: 2023-09-05
Payer: COMMERCIAL

## 2023-09-22 ENCOUNTER — LAB VISIT (OUTPATIENT)
Dept: LAB | Facility: HOSPITAL | Age: 78
End: 2023-09-22
Attending: INTERNAL MEDICINE
Payer: MEDICARE

## 2023-09-22 DIAGNOSIS — I10 PRIMARY HYPERTENSION: ICD-10-CM

## 2023-09-22 DIAGNOSIS — E11.9 TYPE 2 DIABETES MELLITUS WITHOUT COMPLICATION, WITHOUT LONG-TERM CURRENT USE OF INSULIN: ICD-10-CM

## 2023-09-22 LAB
ALBUMIN SERPL BCP-MCNC: 4.1 G/DL (ref 3.5–5.2)
ALP SERPL-CCNC: 74 U/L (ref 55–135)
ALT SERPL W/O P-5'-P-CCNC: 18 U/L (ref 10–44)
ANION GAP SERPL CALC-SCNC: 6 MMOL/L (ref 8–16)
AST SERPL-CCNC: 20 U/L (ref 10–40)
BASOPHILS # BLD AUTO: 0.03 K/UL (ref 0–0.2)
BASOPHILS NFR BLD: 0.5 % (ref 0–1.9)
BILIRUB SERPL-MCNC: 0.6 MG/DL (ref 0.1–1)
BUN SERPL-MCNC: 15 MG/DL (ref 8–23)
CALCIUM SERPL-MCNC: 10.3 MG/DL (ref 8.7–10.5)
CHLORIDE SERPL-SCNC: 105 MMOL/L (ref 95–110)
CHOLEST SERPL-MCNC: 183 MG/DL (ref 120–199)
CHOLEST/HDLC SERPL: 3.5 {RATIO} (ref 2–5)
CO2 SERPL-SCNC: 31 MMOL/L (ref 23–29)
CREAT SERPL-MCNC: 0.9 MG/DL (ref 0.5–1.4)
DIFFERENTIAL METHOD: NORMAL
EOSINOPHIL # BLD AUTO: 0.1 K/UL (ref 0–0.5)
EOSINOPHIL NFR BLD: 1.5 % (ref 0–8)
ERYTHROCYTE [DISTWIDTH] IN BLOOD BY AUTOMATED COUNT: 11.9 % (ref 11.5–14.5)
EST. GFR  (NO RACE VARIABLE): >60 ML/MIN/1.73 M^2
ESTIMATED AVG GLUCOSE: 146 MG/DL (ref 68–131)
GLUCOSE SERPL-MCNC: 155 MG/DL (ref 70–110)
HBA1C MFR BLD: 6.7 % (ref 4–5.6)
HCT VFR BLD AUTO: 41 % (ref 37–48.5)
HDLC SERPL-MCNC: 53 MG/DL (ref 40–75)
HDLC SERPL: 29 % (ref 20–50)
HGB BLD-MCNC: 13.1 G/DL (ref 12–16)
IMM GRANULOCYTES # BLD AUTO: 0.02 K/UL (ref 0–0.04)
IMM GRANULOCYTES NFR BLD AUTO: 0.3 % (ref 0–0.5)
LDLC SERPL CALC-MCNC: 105 MG/DL (ref 63–159)
LYMPHOCYTES # BLD AUTO: 1.8 K/UL (ref 1–4.8)
LYMPHOCYTES NFR BLD: 28.1 % (ref 18–48)
MCH RBC QN AUTO: 30 PG (ref 27–31)
MCHC RBC AUTO-ENTMCNC: 32 G/DL (ref 32–36)
MCV RBC AUTO: 94 FL (ref 82–98)
MONOCYTES # BLD AUTO: 0.6 K/UL (ref 0.3–1)
MONOCYTES NFR BLD: 9.6 % (ref 4–15)
NEUTROPHILS # BLD AUTO: 3.9 K/UL (ref 1.8–7.7)
NEUTROPHILS NFR BLD: 60 % (ref 38–73)
NONHDLC SERPL-MCNC: 130 MG/DL
NRBC BLD-RTO: 0 /100 WBC
PLATELET # BLD AUTO: 226 K/UL (ref 150–450)
PMV BLD AUTO: 11.5 FL (ref 9.2–12.9)
POTASSIUM SERPL-SCNC: 5.1 MMOL/L (ref 3.5–5.1)
PROT SERPL-MCNC: 7 G/DL (ref 6–8.4)
RBC # BLD AUTO: 4.37 M/UL (ref 4–5.4)
SODIUM SERPL-SCNC: 142 MMOL/L (ref 136–145)
TRIGL SERPL-MCNC: 125 MG/DL (ref 30–150)
WBC # BLD AUTO: 6.54 K/UL (ref 3.9–12.7)

## 2023-09-22 PROCEDURE — 36415 COLL VENOUS BLD VENIPUNCTURE: CPT | Performed by: INTERNAL MEDICINE

## 2023-09-22 PROCEDURE — 80061 LIPID PANEL: CPT | Performed by: INTERNAL MEDICINE

## 2023-09-22 PROCEDURE — 83036 HEMOGLOBIN GLYCOSYLATED A1C: CPT | Performed by: INTERNAL MEDICINE

## 2023-09-22 PROCEDURE — 80053 COMPREHEN METABOLIC PANEL: CPT | Performed by: INTERNAL MEDICINE

## 2023-09-22 PROCEDURE — 85025 COMPLETE CBC W/AUTO DIFF WBC: CPT | Performed by: INTERNAL MEDICINE

## 2023-09-25 ENCOUNTER — OFFICE VISIT (OUTPATIENT)
Dept: INTERNAL MEDICINE | Facility: CLINIC | Age: 78
End: 2023-09-25
Payer: MEDICARE

## 2023-09-25 VITALS
WEIGHT: 172.19 LBS | HEIGHT: 64 IN | OXYGEN SATURATION: 99 % | BODY MASS INDEX: 29.4 KG/M2 | DIASTOLIC BLOOD PRESSURE: 78 MMHG | HEART RATE: 72 BPM | SYSTOLIC BLOOD PRESSURE: 126 MMHG

## 2023-09-25 DIAGNOSIS — I10 PRIMARY HYPERTENSION: ICD-10-CM

## 2023-09-25 DIAGNOSIS — Z85.3 HX: BREAST CANCER: ICD-10-CM

## 2023-09-25 DIAGNOSIS — E78.5 HYPERLIPIDEMIA, UNSPECIFIED HYPERLIPIDEMIA TYPE: ICD-10-CM

## 2023-09-25 DIAGNOSIS — E53.8 B12 DEFICIENCY: ICD-10-CM

## 2023-09-25 DIAGNOSIS — E11.9 TYPE 2 DIABETES MELLITUS WITHOUT COMPLICATION, WITHOUT LONG-TERM CURRENT USE OF INSULIN: Primary | ICD-10-CM

## 2023-09-25 PROCEDURE — 99214 OFFICE O/P EST MOD 30 MIN: CPT | Mod: PBBFAC | Performed by: INTERNAL MEDICINE

## 2023-09-25 PROCEDURE — 99999 PR PBB SHADOW E&M-EST. PATIENT-LVL IV: ICD-10-PCS | Mod: PBBFAC,,, | Performed by: INTERNAL MEDICINE

## 2023-09-25 PROCEDURE — 99214 OFFICE O/P EST MOD 30 MIN: CPT | Mod: S$PBB,,, | Performed by: INTERNAL MEDICINE

## 2023-09-25 PROCEDURE — 99214 PR OFFICE/OUTPT VISIT, EST, LEVL IV, 30-39 MIN: ICD-10-PCS | Mod: S$PBB,,, | Performed by: INTERNAL MEDICINE

## 2023-09-25 PROCEDURE — 99999 PR PBB SHADOW E&M-EST. PATIENT-LVL IV: CPT | Mod: PBBFAC,,, | Performed by: INTERNAL MEDICINE

## 2023-09-25 NOTE — PROGRESS NOTES
"Subjective:       Patient ID: Loni Heard is a 77 y.o. female.    Chief Complaint: Follow-up  This is a 77-year-old presents today for follow-up she continues to do her B12 injections although reports that her stayed insurance isn't covering it she remains on letrozole currently but reports has an appointment later with her hematologist and breast surgeon reports she thinks she may be able to get off of it she hopes so because she does feel she gets some side effects with it as far as joint pains and some fluctuations in her pressure.  She is not been taking her amlodipine because her blood pressure has been doing better although she has not been exercising as much as she used to she used to swim pretty regularly and since taking her new classes she has a heavier course load with a lot of a work with some [employer] courses.  She continues to take her medications diabetes has been fairly stable.    Follow-up      Review of Systems   Constitutional:         Not exercising recently busy with her class schedule    HENT:          Dizziness at times    Respiratory:          Had bad uri spring thought maybe rsv       Objective:    Blood pressure 126/78, pulse 72, height 5' 4" (1.626 m), weight 78.1 kg (172 lb 2.9 oz), SpO2 99 %.   Physical Exam  Constitutional:       General: She is not in acute distress.  HENT:      Mouth/Throat:      Pharynx: Oropharynx is clear.   Cardiovascular:      Rate and Rhythm: Normal rate and regular rhythm.      Heart sounds: Normal heart sounds. No murmur heard.     No friction rub. No gallop.      Comments: Post surgical radiaton changes left  Stable discomfort   Pulmonary:      Effort: Pulmonary effort is normal. No respiratory distress.      Breath sounds: Normal breath sounds.   Abdominal:      General: Bowel sounds are normal.      Palpations: Abdomen is soft. There is no mass.      Tenderness: There is no abdominal tenderness.   Skin:     Findings: No erythema.   Neurological:    "   Mental Status: She is alert.         Assessment:       1. Type 2 diabetes mellitus without complication, without long-term current use of insulin    2. Hyperlipidemia, unspecified hyperlipidemia type    3. Primary hypertension    4. HX: breast cancer    5. B12 deficiency        Plan:       Loni was seen today for follow-up.    Diagnoses and all orders for this visit:    Type 2 diabetes mellitus without complication, without long-term current use of insulin  She remains on Januvia will continue current regimen  -     CBC Auto Differential; Future  -     Comprehensive Metabolic Panel; Future  -     Hemoglobin A1C; Future    Hyperlipidemia, unspecified hyperlipidemia type  Elevated coronary calcium score following with Cardiology is taking rosuvastatin and tolerating currently  -     CBC Auto Differential; Future  -     Comprehensive Metabolic Panel; Future  -     Hemoglobin A1C; Future    Primary hypertension  Her home blood pressure has been acceptable she does have amlodipine to resume if    HX: breast cancer  History of she is follow-up appoint with her breast specialist later today and reports up-to-date on her mammogram  She is hopint she will be taken off her letrozole soon     B12 deficiency  Continue B12 injections discussed    She has bone density scheudled    Follow-up 4 months with labs annual eye appointment recommended discussed flu RSV and new COVID vaccine when available

## 2023-10-17 ENCOUNTER — HOSPITAL ENCOUNTER (OUTPATIENT)
Dept: RADIOLOGY | Facility: CLINIC | Age: 78
Discharge: HOME | End: 2023-10-17
Attending: INTERNAL MEDICINE
Payer: MEDICARE

## 2023-10-17 DIAGNOSIS — Z78.0 POSTMENOPAUSAL: ICD-10-CM

## 2023-10-17 PROCEDURE — 77080 DXA BONE DENSITY AXIAL SKELETON 1 OR MORE SITES: ICD-10-PCS | Mod: 26,,, | Performed by: INTERNAL MEDICINE

## 2023-10-17 PROCEDURE — 77080 DXA BONE DENSITY AXIAL: CPT | Mod: TC

## 2023-10-17 PROCEDURE — 77080 DXA BONE DENSITY AXIAL: CPT | Mod: 26,,, | Performed by: INTERNAL MEDICINE

## 2023-11-13 NOTE — TELEPHONE ENCOUNTER
No care due was identified.  [facility] Embedded Care Due Messages. Reference number: [id number].   11/13/2023 3:36:00 AM CST

## 2023-11-16 ENCOUNTER — PATIENT MESSAGE (OUTPATIENT)
Dept: INTERNAL MEDICINE | Facility: CLINIC | Age: 78
End: 2023-11-16
Payer: COMMERCIAL

## 2023-11-16 ENCOUNTER — PATIENT MESSAGE (OUTPATIENT)
Dept: OPHTHALMOLOGY | Facility: CLINIC | Age: 78
End: 2023-11-16
Payer: COMMERCIAL

## 2023-12-04 ENCOUNTER — NURSE TRIAGE (OUTPATIENT)
Dept: ADMINISTRATIVE | Facility: CLINIC | Age: 78
End: 2023-12-04
Payer: COMMERCIAL

## 2023-12-05 NOTE — TELEPHONE ENCOUNTER
Called and spoke to patient. Pt reports feeling better since taking Tylenol, with mild symptoms    Pt reports-  -afebrile since last night (Took Tylenol at 2am)  -myalgia  -Tiredness  -intermittent productive cough with clear/yellow sputum    Denies nasal congestion, sore throat, sinus/ear pain/pressure  Symptom onset- Sunday, postnasal gtt and fever    911 precautions discussed

## 2023-12-05 NOTE — TELEPHONE ENCOUNTER
Pt calling and c/o fever 101 and and yesterday it was 100 pt only took 1 tylenol yesterday and today and pt triaged and told that she would need to take the tylenol at the appropriate dosage to see if effective. Pt care advice is to see the Dr within 4 hours because of the fever at 101, Pt told that she is urinating good nut often and no burning and told that its important that if fever remains to go get seen. Pt had runny nose and sore throat yesterday but now gone but think she has the flu. I reinforced the importance of the seeing Dr within 4 hours. Pt will call back if any other questions or concerns           Reason for Disposition   [1] Fever > 101 F (38.3 C) AND [2] age > 60 years    Additional Information   Negative: Shock suspected (e.g., cold/pale/clammy skin, too weak to stand, low BP, rapid pulse)   Negative: Difficult to awaken or acting confused (e.g., disoriented, slurred speech)   Negative: [1] Difficulty breathing AND [2] bluish lips, tongue or face   Negative: New-onset rash with multiple purple (or blood-colored) spots or dots   Negative: Sounds like a life-threatening emergency to the triager   Negative: [1] Headache AND [2] stiff neck (can't touch chin to chest)   Negative: Difficulty breathing   Negative: IV Drug Use (IVDU)   Negative: [1] Drinking very little AND [2] dehydration suspected (e.g., no urine > 12 hours, very dry mouth, very lightheaded)   Negative: Widespread rash and cause unknown   Negative: Patient sounds very sick or weak to the triager  (Exception: Mild weakness and hasn't taken fever medicine.)   Negative: Fever > 104 F (40 C)    Protocols used: Fever-A-AH

## 2023-12-15 ENCOUNTER — TELEPHONE (OUTPATIENT)
Dept: INTERNAL MEDICINE | Facility: CLINIC | Age: 78
End: 2023-12-15
Payer: COMMERCIAL

## 2023-12-15 NOTE — TELEPHONE ENCOUNTER
----- Message from Mariaelena Mallory MD sent at 12/15/2023  2:38 PM CST -----  Regarding: FW: Flu  Would recomomend that she await full recovery first  Then she can get as soon as feeling 100%  Kj   ----- Message -----  From: Lu Chen  Sent: 12/15/2023  12:54 PM CST  To: Mariaelena Mallory MD  Subject: Flu                                              Flu     12/15/23-Pt states she just getting over the Flu and wants to know can she take the covid booster. Please call pt at [phone], MRN# [id number]

## 2023-12-15 NOTE — TELEPHONE ENCOUNTER
----- Message from Mariaelena Mallory MD sent at 12/15/2023  2:38 PM CST -----  Regarding: FW: Flu  Would recomomend that she await full recovery first  Then she can get as soon as feeling 100%  Kj   ----- Message -----  From: Lu Chen  Sent: 12/15/2023  12:54 PM CST  To: Mariaelena Mallory MD  Subject: Flu                                              Flu     12/15/23-Pt states she just getting over the Flu and wants to know can she take the covid booster. Please call pt at [phone], MRN# [id number]

## 2023-12-18 ENCOUNTER — OFFICE VISIT (OUTPATIENT)
Dept: OPHTHALMOLOGY | Facility: CLINIC | Age: 78
End: 2023-12-18
Payer: MEDICARE

## 2023-12-18 ENCOUNTER — TELEPHONE (OUTPATIENT)
Dept: INTERNAL MEDICINE | Facility: CLINIC | Age: 78
End: 2023-12-18
Payer: COMMERCIAL

## 2023-12-18 DIAGNOSIS — E11.9 DM TYPE 2 WITHOUT RETINOPATHY: ICD-10-CM

## 2023-12-18 DIAGNOSIS — H25.13 NS (NUCLEAR SCLEROSIS), BILATERAL: Primary | ICD-10-CM

## 2023-12-18 DIAGNOSIS — H02.823 SEBACEOUS CYSTS OF EYELIDS OF BOTH EYES: ICD-10-CM

## 2023-12-18 DIAGNOSIS — H52.7 REFRACTIVE ERROR: ICD-10-CM

## 2023-12-18 DIAGNOSIS — H02.826 SEBACEOUS CYSTS OF EYELIDS OF BOTH EYES: ICD-10-CM

## 2023-12-18 DIAGNOSIS — D23.10 PAPILLOMA OF RIGHT EYELID: ICD-10-CM

## 2023-12-18 PROCEDURE — 99212 OFFICE O/P EST SF 10 MIN: CPT | Mod: PBBFAC,PO | Performed by: OPHTHALMOLOGY

## 2023-12-18 PROCEDURE — 99999 PR PBB SHADOW E&M-EST. PATIENT-LVL II: CPT | Mod: PBBFAC,,, | Performed by: OPHTHALMOLOGY

## 2023-12-18 PROCEDURE — 99999 PR PBB SHADOW E&M-EST. PATIENT-LVL II: ICD-10-PCS | Mod: PBBFAC,,, | Performed by: OPHTHALMOLOGY

## 2023-12-18 PROCEDURE — 92014 COMPRE OPH EXAM EST PT 1/>: CPT | Mod: S$PBB,,, | Performed by: OPHTHALMOLOGY

## 2023-12-18 PROCEDURE — 92014 PR EYE EXAM, EST PATIENT,COMPREHESV: ICD-10-PCS | Mod: S$PBB,,, | Performed by: OPHTHALMOLOGY

## 2023-12-18 NOTE — PROGRESS NOTES
Subjective:       Patient ID: Loni Heard is a 78 y.o. female.    Chief Complaint: No chief complaint on file.    HPI    78 y.o female is here for an annual/diabetic eye exam. Dryness sometimes   and uses OTC drops sometimes. Does a lot of reading with +2.25D readers   OTC as she is in school. Difficulty driving at night sometimes with light.            Last edited by Henry Donaldson on 12/18/2023  9:35 AM.             Assessment:       1. NS (nuclear sclerosis), bilateral    2. Papilloma of right eyelid    3. Sebaceous cysts of eyelids of both eyes    4. DM type 2 without retinopathy    5. Refractive error        Plan:       Cataracts- Not visually significant.  RUL papilloma- Causing irritation. Pt wants it removed.    Eyelid cysts-Pt wants them removed.  DM-No NPDR OU.  RE-Pt does not want MRx.      RTC me for RUL papilloma excision.  Refer to Dr Ornelas for eyelid cysts excision.

## 2023-12-18 NOTE — TELEPHONE ENCOUNTER
----- Message from Mariaelena Mallory MD sent at 12/15/2023  2:38 PM CST -----  Regarding: FW: Flu  Would recomomend that she await full recovery first  Then she can get as soon as feeling 100%  Kj   ----- Message -----  From: Lu Chen  Sent: 12/15/2023  12:54 PM CST  To: Mariaelena Mallory MD  Subject: Flu                                              Flu     12/15/23-Pt states she just getting over the Flu and wants to know can she take the covid booster. Please call pt at [phone], MRN# [id number]

## 2023-12-18 NOTE — TELEPHONE ENCOUNTER
Called pt in regards to waiting full recover from flu to get covid booster. Pt states she will get booster on 12/19 pt spoke with nurse. Pt states she is fully recovered from flu.

## 2024-01-11 DIAGNOSIS — Z00.00 ENCOUNTER FOR MEDICARE ANNUAL WELLNESS EXAM: ICD-10-CM

## 2024-01-18 ENCOUNTER — LAB VISIT (OUTPATIENT)
Dept: LAB | Facility: HOSPITAL | Age: 79
End: 2024-01-18
Attending: INTERNAL MEDICINE
Payer: MEDICARE

## 2024-01-18 DIAGNOSIS — E78.49 OTHER HYPERLIPIDEMIA: ICD-10-CM

## 2024-01-18 DIAGNOSIS — R93.1 AGATSTON CORONARY ARTERY CALCIUM SCORE BETWEEN 200 AND 399: ICD-10-CM

## 2024-01-18 DIAGNOSIS — E11.9 TYPE 2 DIABETES MELLITUS WITHOUT COMPLICATION, WITHOUT LONG-TERM CURRENT USE OF INSULIN: ICD-10-CM

## 2024-01-18 DIAGNOSIS — E78.5 HYPERLIPIDEMIA, UNSPECIFIED HYPERLIPIDEMIA TYPE: ICD-10-CM

## 2024-01-18 LAB
ALBUMIN SERPL BCP-MCNC: 4 G/DL (ref 3.5–5.2)
ALP SERPL-CCNC: 69 U/L (ref 55–135)
ALT SERPL W/O P-5'-P-CCNC: 19 U/L (ref 10–44)
ANION GAP SERPL CALC-SCNC: 8 MMOL/L (ref 8–16)
AST SERPL-CCNC: 22 U/L (ref 10–40)
BASOPHILS # BLD AUTO: 0.02 K/UL (ref 0–0.2)
BASOPHILS NFR BLD: 0.3 % (ref 0–1.9)
BILIRUB SERPL-MCNC: 0.7 MG/DL (ref 0.1–1)
BUN SERPL-MCNC: 15 MG/DL (ref 8–23)
CALCIUM SERPL-MCNC: 10.3 MG/DL (ref 8.7–10.5)
CHLORIDE SERPL-SCNC: 103 MMOL/L (ref 95–110)
CO2 SERPL-SCNC: 27 MMOL/L (ref 23–29)
CREAT SERPL-MCNC: 0.9 MG/DL (ref 0.5–1.4)
DIFFERENTIAL METHOD BLD: NORMAL
EOSINOPHIL # BLD AUTO: 0.1 K/UL (ref 0–0.5)
EOSINOPHIL NFR BLD: 0.8 % (ref 0–8)
ERYTHROCYTE [DISTWIDTH] IN BLOOD BY AUTOMATED COUNT: 12.4 % (ref 11.5–14.5)
EST. GFR  (NO RACE VARIABLE): >60 ML/MIN/1.73 M^2
ESTIMATED AVG GLUCOSE: 140 MG/DL (ref 68–131)
GLUCOSE SERPL-MCNC: 133 MG/DL (ref 70–110)
HBA1C MFR BLD: 6.5 % (ref 4–5.6)
HCT VFR BLD AUTO: 39.9 % (ref 37–48.5)
HGB BLD-MCNC: 13 G/DL (ref 12–16)
IMM GRANULOCYTES # BLD AUTO: 0.03 K/UL (ref 0–0.04)
IMM GRANULOCYTES NFR BLD AUTO: 0.4 % (ref 0–0.5)
LYMPHOCYTES # BLD AUTO: 1.8 K/UL (ref 1–4.8)
LYMPHOCYTES NFR BLD: 25.1 % (ref 18–48)
MCH RBC QN AUTO: 30 PG (ref 27–31)
MCHC RBC AUTO-ENTMCNC: 32.6 G/DL (ref 32–36)
MCV RBC AUTO: 92 FL (ref 82–98)
MONOCYTES # BLD AUTO: 0.6 K/UL (ref 0.3–1)
MONOCYTES NFR BLD: 8.6 % (ref 4–15)
NEUTROPHILS # BLD AUTO: 4.6 K/UL (ref 1.8–7.7)
NEUTROPHILS NFR BLD: 64.8 % (ref 38–73)
NRBC BLD-RTO: 0 /100 WBC
PLATELET # BLD AUTO: 224 K/UL (ref 150–450)
PMV BLD AUTO: 11.3 FL (ref 9.2–12.9)
POTASSIUM SERPL-SCNC: 4.5 MMOL/L (ref 3.5–5.1)
PROT SERPL-MCNC: 6.8 G/DL (ref 6–8.4)
RBC # BLD AUTO: 4.33 M/UL (ref 4–5.4)
SODIUM SERPL-SCNC: 138 MMOL/L (ref 136–145)
WBC # BLD AUTO: 7.09 K/UL (ref 3.9–12.7)

## 2024-01-18 PROCEDURE — 85025 COMPLETE CBC W/AUTO DIFF WBC: CPT | Performed by: INTERNAL MEDICINE

## 2024-01-18 PROCEDURE — 83036 HEMOGLOBIN GLYCOSYLATED A1C: CPT | Performed by: INTERNAL MEDICINE

## 2024-01-18 PROCEDURE — 36415 COLL VENOUS BLD VENIPUNCTURE: CPT | Performed by: INTERNAL MEDICINE

## 2024-01-18 PROCEDURE — 80053 COMPREHEN METABOLIC PANEL: CPT | Performed by: INTERNAL MEDICINE

## 2024-01-19 RX ORDER — ROSUVASTATIN CALCIUM 20 MG/1
20 TABLET, COATED ORAL DAILY
Qty: 90 TABLET | Refills: 3 | Status: SHIPPED | OUTPATIENT
Start: 2024-01-19 | End: 2024-02-05

## 2024-01-29 ENCOUNTER — OFFICE VISIT (OUTPATIENT)
Dept: INTERNAL MEDICINE | Facility: CLINIC | Age: 79
End: 2024-01-29
Payer: MEDICARE

## 2024-01-29 VITALS
SYSTOLIC BLOOD PRESSURE: 144 MMHG | HEART RATE: 71 BPM | BODY MASS INDEX: 28.56 KG/M2 | DIASTOLIC BLOOD PRESSURE: 70 MMHG | OXYGEN SATURATION: 99 % | WEIGHT: 167.31 LBS | HEIGHT: 64 IN

## 2024-01-29 DIAGNOSIS — I10 PRIMARY HYPERTENSION: Primary | ICD-10-CM

## 2024-01-29 DIAGNOSIS — E53.8 B12 DEFICIENCY: ICD-10-CM

## 2024-01-29 DIAGNOSIS — M81.0 OSTEOPOROSIS, UNSPECIFIED OSTEOPOROSIS TYPE, UNSPECIFIED PATHOLOGICAL FRACTURE PRESENCE: ICD-10-CM

## 2024-01-29 DIAGNOSIS — E11.9 TYPE 2 DIABETES MELLITUS WITHOUT COMPLICATION, WITHOUT LONG-TERM CURRENT USE OF INSULIN: ICD-10-CM

## 2024-01-29 DIAGNOSIS — Z85.3 HX: BREAST CANCER: ICD-10-CM

## 2024-01-29 DIAGNOSIS — E78.5 HYPERLIPIDEMIA, UNSPECIFIED HYPERLIPIDEMIA TYPE: ICD-10-CM

## 2024-01-29 PROBLEM — C50.912 BREAST CANCER, LEFT BREAST: Status: RESOLVED | Noted: 2018-06-25 | Resolved: 2024-01-29

## 2024-01-29 PROCEDURE — 99999 PR PBB SHADOW E&M-EST. PATIENT-LVL V: CPT | Mod: PBBFAC,,, | Performed by: INTERNAL MEDICINE

## 2024-01-29 PROCEDURE — 99215 OFFICE O/P EST HI 40 MIN: CPT | Mod: PBBFAC | Performed by: INTERNAL MEDICINE

## 2024-01-29 PROCEDURE — 99214 OFFICE O/P EST MOD 30 MIN: CPT | Mod: S$PBB,,, | Performed by: INTERNAL MEDICINE

## 2024-01-29 NOTE — PROGRESS NOTES
"Subjective:       Patient ID: Loni Heard is a 78 y.o. female.    Chief Complaint: Follow-up  This is a 78-year-old who presents today for follow-up she reports that she has been doing fairly well continues take her medicines watch her diet she has noticed that her blood pressure is trending back up she has not been taking her amlodipine recently but will plan to resume she was taken off of her letrozole by her hematologist oncologist after completing the course.  She wondered about her calcium level and whether it relates to her calcium score or the fact she drinks a lot of butter milk. Also her [facility]  She does take her B12 injections but not as consistently as before.  She did have the flu since last visit reports had episode where she had high fevers body aches symptoms did resolve she has not gotten her flu shot yet  She was started on estradiol pill by her gynecologist she reports with the approval of her hematologist oncologist as well she will send us an update on  the dosing.  She still has some issues with her knees sees an outlying orthopedist    Follow-up      Review of Systems   Musculoskeletal:         Right leg burning pain at times at night   Improves with pillow    Psychiatric/Behavioral:          Some stressors taking two difficult classes currently        Objective:    Blood pressure (!) 144/70, pulse 71, height 5' 4" (1.626 m), weight 75.9 kg (167 lb 5.3 oz), SpO2 99 %.   Physical Exam  Constitutional:       General: She is not in acute distress.  HENT:      Head: Normocephalic.   Cardiovascular:      Rate and Rhythm: Normal rate and regular rhythm.      Heart sounds: Normal heart sounds. No murmur heard.     No friction rub. No gallop.      Comments: Surgical changes Radiation changes left   Pulmonary:      Effort: Pulmonary effort is normal. No respiratory distress.      Breath sounds: Normal breath sounds.   Abdominal:      General: Bowel sounds are normal.      Palpations: Abdomen is " soft. There is no mass.      Tenderness: There is no abdominal tenderness.   Musculoskeletal:      Comments: Negative leg raise    Skin:     Findings: No erythema.   Neurological:      Mental Status: She is alert.   Psychiatric:         Mood and Affect: Mood normal.         Assessment:       1. Primary hypertension    2. Type 2 diabetes mellitus without complication, without long-term current use of insulin    3. Hyperlipidemia, unspecified hyperlipidemia type    4. HX: breast cancer    5. Osteoporosis, unspecified osteoporosis type, unspecified pathological fracture presence    6. B12 deficiency        Plan:       Loni was seen today for follow-up.    Diagnoses and all orders for this visit:    Primary hypertension  Discussed resume amlodipine continue home monitoring  -     Comprehensive Metabolic Panel; Future  -     CBC Auto Differential; Future  -     Hemoglobin A1C; Future  -     Lipid Panel; Future    Type 2 diabetes mellitus without complication, without long-term current use of insulin  Hemoglobin A1c doing well  -     Comprehensive Metabolic Panel; Future  -     CBC Auto Differential; Future  -     Hemoglobin A1C; Future  -     Lipid Panel; Future    For leg discomfort intermittently we discussed back precautions try to avoid heavy lifting she reports it has not to the point where she needs to take medicines for it    Hyperlipidemia, unspecified hyperlipidemia type  She remains on statin and reports has a cardiology follow-up    B12 deficiency she is she is taking B12 although not as consistently her insurance stopped covering    HX: breast cancer  She is completed her course of letrozole and follows with her outlying hematologist oncologist she is up-to-date on her mammogram    Osteoporosis, unspecified osteoporosis type, unspecified pathological fracture presence  We discussed she is reluctant to take medications but would be agreeable to endocrinology consult for recommendations  -     Ambulatory  referral/consult to Endocrinology; Future    Reviewed her labs     Flu shot rsv discussed    Follow-up 4 months

## 2024-02-05 ENCOUNTER — OFFICE VISIT (OUTPATIENT)
Dept: CARDIOLOGY | Facility: CLINIC | Age: 79
End: 2024-02-05
Payer: MEDICARE

## 2024-02-05 VITALS
HEART RATE: 70 BPM | SYSTOLIC BLOOD PRESSURE: 144 MMHG | DIASTOLIC BLOOD PRESSURE: 66 MMHG | WEIGHT: 171.31 LBS | BODY MASS INDEX: 29.24 KG/M2 | HEIGHT: 64 IN | OXYGEN SATURATION: 98 %

## 2024-02-05 DIAGNOSIS — R42 DIZZINESS: ICD-10-CM

## 2024-02-05 DIAGNOSIS — E78.49 OTHER HYPERLIPIDEMIA: ICD-10-CM

## 2024-02-05 DIAGNOSIS — R09.89 BRUIT: Primary | ICD-10-CM

## 2024-02-05 DIAGNOSIS — I10 ESSENTIAL HYPERTENSION: ICD-10-CM

## 2024-02-05 DIAGNOSIS — R93.1 AGATSTON CORONARY ARTERY CALCIUM SCORE BETWEEN 200 AND 399: ICD-10-CM

## 2024-02-05 DIAGNOSIS — E11.9 TYPE 2 DIABETES MELLITUS WITHOUT COMPLICATION, WITHOUT LONG-TERM CURRENT USE OF INSULIN: ICD-10-CM

## 2024-02-05 DIAGNOSIS — C50.912 MALIGNANT NEOPLASM OF LEFT FEMALE BREAST, UNSPECIFIED ESTROGEN RECEPTOR STATUS, UNSPECIFIED SITE OF BREAST: ICD-10-CM

## 2024-02-05 DIAGNOSIS — Z82.3 FAMILY HISTORY OF CVA: ICD-10-CM

## 2024-02-05 DIAGNOSIS — R42 LIGHT HEADEDNESS: ICD-10-CM

## 2024-02-05 PROCEDURE — 99999 PR PBB SHADOW E&M-EST. PATIENT-LVL IV: CPT | Mod: PBBFAC,,, | Performed by: INTERNAL MEDICINE

## 2024-02-05 PROCEDURE — 99214 OFFICE O/P EST MOD 30 MIN: CPT | Mod: S$PBB,,, | Performed by: INTERNAL MEDICINE

## 2024-02-05 PROCEDURE — 99214 OFFICE O/P EST MOD 30 MIN: CPT | Mod: PBBFAC | Performed by: INTERNAL MEDICINE

## 2024-02-05 RX ORDER — ROSUVASTATIN CALCIUM 40 MG/1
40 TABLET, COATED ORAL NIGHTLY
Qty: 90 TABLET | Refills: 3 | Status: SHIPPED | OUTPATIENT
Start: 2024-02-05 | End: 2025-02-04

## 2024-02-05 NOTE — PROGRESS NOTES
Subjective:    Patient ID:  Loni Heard is a 78 y.o. female who presents for follow-up of elevated CAC    HPI  The patient is a 78 year old female last seen 9/22/22 for follow up of an elevated CAC of 251. She was a non smoker and no family history of CAD. Last EKG was in 2019 and was normal. 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  She had a light headedness X 2 and some increased ANGUIANO. She has  not been swimming for exercise as usual. She denies chest pain.Her father had stroke and is concerned. She is having explosive diarrhea for 2 1/2 months [januvia?]She not adherent to low sugar diet at times  Lab Results   Component Value Date     01/18/2024    K 4.5 01/18/2024     01/18/2024    CO2 27 01/18/2024    BUN 15 01/18/2024    CREATININE 0.9 01/18/2024     (H) 01/18/2024    HGBA1C 6.5 (H) 01/18/2024    AST 22 01/18/2024    ALT 19 01/18/2024    ALBUMIN 4.0 01/18/2024    PROT 6.8 01/18/2024    BILITOT 0.7 01/18/2024    WBC 7.09 01/18/2024    HGB 13.0 01/18/2024    HCT 39.9 01/18/2024    MCV 92 01/18/2024     01/18/2024    TSH 2.839 08/07/2019         Lab Results   Component Value Date    CHOL 183 09/22/2023    HDL 53 09/22/2023    TRIG 125 09/22/2023       Lab Results   Component Value Date    LDLCALC 105.0 09/22/2023       Past Medical History:   Diagnosis Date    Allergy     hx rhinitis     Asthma     childhood asthma     Atrophic gastritis     B12 deficiency     Cancer     left breast invasive ductal carcinoma     Cataract     Depression     Diabetes mellitus type II     Hyperlipidemia     Hypertension     Mild vitamin D deficiency     Pulmonary nodule     micronodule 8/2017        Current Outpatient Medications:     amLODIPine (NORVASC) 5 MG tablet, Take 1 tablet (5 mg total) by mouth once daily., Disp: 90 tablet, Rfl: 3    blood sugar diagnostic Strp, Use for blood sugar testing once daily -once touch ultra test strips, Disp: 50 each, Rfl: 4    blood-glucose  "meter Misc, Use for blood sugar testing as instructed once daily - product selection permitted, Disp: 1 each, Rfl: 0    cholecalciferol, vitamin D3, (VITAMIN D3) 2,000 unit Tab, Take 2 tablets (4,000 Units total) by mouth once daily., Disp: , Rfl:     coenzyme Q10 10 mg capsule, Take 10 mg by mouth., Disp: , Rfl:     cyanocobalamin 1,000 mcg/mL injection, Inject 1000 mcg into the muscle twice monthly., Disp: 9 mL, Rfl: 11    lancets Misc, Use for blood sugar testing once daily - one touch delica lanets 30 g, Disp: 50 each, Rfl: 3    magnesium 250 mg Tab, Take by mouth., Disp: , Rfl:     SITagliptin phosphate (JANUVIA) 100 MG Tab, TAKE 1 TABLET BY MOUTH EVERY DAY, Disp: 90 tablet, Rfl: 1    syringe with needle, safety (BD INTEGRA SYRINGE) 3 mL 25 gauge x 1" Syrg, for use with b 2 injection, Disp: 100 Syringe, Rfl: 6    vit C/E/Zn/coppr/lutein/zeaxan (PRESERVISION AREDS-2 ORAL), , Disp: , Rfl:     VITAMIN K2 ORAL, Take by mouth., Disp: , Rfl:     rosuvastatin (CRESTOR) 40 MG Tab, Take 1 tablet (40 mg total) by mouth every evening., Disp: 90 tablet, Rfl: 3    Current Facility-Administered Medications:     cyanocobalamin injection 1,000 mcg, 1,000 mcg, Intramuscular, Q14 Days, Mariaelena Mallory MD          Review of Systems   Constitutional: Negative for decreased appetite, diaphoresis, fever, malaise/fatigue, weight gain and weight loss.   HENT:  Negative for congestion, ear discharge, ear pain and nosebleeds.    Eyes:  Negative for blurred vision, double vision and visual disturbance.   Cardiovascular:  Negative for chest pain, claudication, cyanosis, dyspnea on exertion, irregular heartbeat, leg swelling, near-syncope, orthopnea, palpitations, paroxysmal nocturnal dyspnea and syncope.   Respiratory:  Negative for cough, hemoptysis, shortness of breath, sleep disturbances due to breathing, snoring, sputum production and wheezing.    Endocrine: Negative for polydipsia, polyphagia and polyuria. " "  Hematologic/Lymphatic: Negative for adenopathy and bleeding problem. Does not bruise/bleed easily.   Skin:  Negative for color change, nail changes, poor wound healing and rash.   Musculoskeletal:  Negative for muscle cramps and muscle weakness.   Gastrointestinal:  Positive for diarrhea. Negative for abdominal pain, anorexia, change in bowel habit, hematochezia, nausea and vomiting.   Genitourinary:  Negative for dysuria, frequency and hematuria.   Neurological:  Negative for brief paralysis, difficulty with concentration, excessive daytime sleepiness, dizziness, focal weakness, headaches, light-headedness, seizures, vertigo and weakness.   Psychiatric/Behavioral:  Negative for altered mental status and depression.    Allergic/Immunologic: Negative for persistent infections.        Objective:BP (!) 144/66   Pulse 70   Ht 5' 4" (1.626 m)   Wt 77.7 kg (171 lb 4.8 oz)   SpO2 98%   BMI 29.40 kg/m²             Physical Exam  Constitutional:       Appearance: She is well-developed. She is obese.   HENT:      Head: Normocephalic.      Right Ear: External ear normal.      Left Ear: External ear normal.      Nose: Nose normal.   Eyes:      General: No scleral icterus.     Conjunctiva/sclera: Conjunctivae normal.      Pupils: Pupils are equal, round, and reactive to light.   Neck:      Thyroid: No thyromegaly.      Vascular: No JVD.      Trachea: No tracheal deviation.   Cardiovascular:      Rate and Rhythm: Normal rate and regular rhythm.      Pulses: Intact distal pulses.           Carotid pulses are 2+ on the right side and 2+ on the left side.       Dorsalis pedis pulses are 2+ on the left side.      Heart sounds: Normal heart sounds, S1 normal and S2 normal. No murmur heard.     No friction rub. No gallop.   Pulmonary:      Effort: Pulmonary effort is normal. No respiratory distress.      Breath sounds: Normal breath sounds. No wheezing or rales.   Chest:      Chest wall: No tenderness.   Abdominal:      General: " Bowel sounds are normal. There is no distension.      Palpations: Abdomen is soft.      Tenderness: There is no abdominal tenderness. There is no guarding.   Musculoskeletal:         General: No tenderness. Normal range of motion.      Cervical back: Normal range of motion.   Lymphadenopathy:      Comments: Palpation of lymph nodes of neck and groin normal   Skin:     General: Skin is warm and dry.      Coloration: Skin is not pale.      Findings: No erythema or rash.      Comments: Palpation of skin normal   Neurological:      Mental Status: She is alert and oriented to person, place, and time.      Cranial Nerves: No cranial nerve deficit.      Motor: No abnormal muscle tone.      Coordination: Coordination normal.   Psychiatric:         Behavior: Behavior normal.         Thought Content: Thought content normal.         Judgment: Judgment normal.         Assessment:       1. Family history of CVA    2. Other hyperlipidemia    3. Essential hypertension    4. Malignant neoplasm of left female breast, unspecified estrogen receptor status, unspecified site of breast    5. Type 2 diabetes mellitus without complication, without long-term current use of insulin    6. Dizziness    7. Light headedness    8. Agatston coronary artery calcium score between 200 and 399         Plan:       Loni was seen today for follow-up.    Diagnoses and all orders for this visit:    Family history of CVA  -     CV Ultrasound Bilateral Doppler Carotid; Future    Other hyperlipidemia  -     CV Ultrasound Bilateral Doppler Carotid; Future  -     Lipid Panel; Future; Expected date: 02/05/2024  -     rosuvastatin (CRESTOR) 40 MG Tab; Take 1 tablet (40 mg total) by mouth every evening.  -     Lipid Panel; Future; Expected date: 05/05/2024    Essential hypertension  -     CV Ultrasound Bilateral Doppler Carotid; Future    Malignant neoplasm of left female breast, unspecified estrogen receptor status, unspecified site of breast    Type 2  diabetes mellitus without complication, without long-term current use of insulin  -     CV Ultrasound Bilateral Doppler Carotid; Future  -     Lipid Panel; Future; Expected date: 02/05/2024    Dizziness    Light headedness    Agatston coronary artery calcium score between 200 and 399  -     rosuvastatin (CRESTOR) 40 MG Tab; Take 1 tablet (40 mg total) by mouth every evening.  -     Lipid Panel; Future; Expected date: 05/05/2024

## 2024-02-06 ENCOUNTER — PATIENT MESSAGE (OUTPATIENT)
Dept: INTERNAL MEDICINE | Facility: CLINIC | Age: 79
End: 2024-02-06
Payer: COMMERCIAL

## 2024-02-06 RX ORDER — ESTRADIOL 10 UG/1
1 INSERT VAGINAL
COMMUNITY

## 2024-02-12 DIAGNOSIS — R19.7 DIARRHEA, UNSPECIFIED TYPE: ICD-10-CM

## 2024-02-12 NOTE — TELEPHONE ENCOUNTER
Spoke with pt discussed  Intermittant diarrhea she plans to make appt with her gi doctor  Discussed increased dietary fiber and plan for stool studies    Stool study orders in for pt notify her how to  to submit

## 2024-02-14 ENCOUNTER — LAB VISIT (OUTPATIENT)
Dept: LAB | Facility: HOSPITAL | Age: 79
End: 2024-02-14
Attending: INTERNAL MEDICINE
Payer: MEDICARE

## 2024-02-14 DIAGNOSIS — R19.7 DIARRHEA, UNSPECIFIED TYPE: ICD-10-CM

## 2024-02-14 LAB
C DIFF GDH STL QL: NEGATIVE
C DIFF TOX A+B STL QL IA: NEGATIVE

## 2024-02-14 PROCEDURE — 87329 GIARDIA AG IA: CPT | Performed by: INTERNAL MEDICINE

## 2024-02-14 PROCEDURE — 87045 FECES CULTURE AEROBIC BACT: CPT | Performed by: INTERNAL MEDICINE

## 2024-02-14 PROCEDURE — 87046 STOOL CULTR AEROBIC BACT EA: CPT | Performed by: INTERNAL MEDICINE

## 2024-02-14 PROCEDURE — 87449 NOS EACH ORGANISM AG IA: CPT | Performed by: INTERNAL MEDICINE

## 2024-02-14 PROCEDURE — 87449 NOS EACH ORGANISM AG IA: CPT | Mod: 91 | Performed by: INTERNAL MEDICINE

## 2024-02-14 PROCEDURE — 87427 SHIGA-LIKE TOXIN AG IA: CPT | Mod: 59 | Performed by: INTERNAL MEDICINE

## 2024-02-15 LAB
CRYPTOSP AG STL QL IA: NEGATIVE
E COLI SXT1 STL QL IA: NEGATIVE
E COLI SXT2 STL QL IA: NEGATIVE
G LAMBLIA AG STL QL IA: NEGATIVE

## 2024-02-16 ENCOUNTER — LAB VISIT (OUTPATIENT)
Dept: LAB | Facility: HOSPITAL | Age: 79
End: 2024-02-16
Attending: INTERNAL MEDICINE
Payer: MEDICARE

## 2024-02-16 ENCOUNTER — TELEPHONE (OUTPATIENT)
Dept: INTERNAL MEDICINE | Facility: CLINIC | Age: 79
End: 2024-02-16
Payer: COMMERCIAL

## 2024-02-16 DIAGNOSIS — E11.9 TYPE 2 DIABETES MELLITUS WITHOUT COMPLICATION, WITHOUT LONG-TERM CURRENT USE OF INSULIN: Primary | ICD-10-CM

## 2024-02-16 DIAGNOSIS — E11.9 TYPE 2 DIABETES MELLITUS WITHOUT COMPLICATION, WITHOUT LONG-TERM CURRENT USE OF INSULIN: ICD-10-CM

## 2024-02-16 LAB
ALBUMIN/CREAT UR: 9.4 UG/MG (ref 0–30)
BACTERIA STL CULT: NORMAL
CREAT UR-MCNC: 53 MG/DL (ref 15–325)
MICROALBUMIN UR DL<=1MG/L-MCNC: 5 UG/ML

## 2024-02-16 PROCEDURE — 82043 UR ALBUMIN QUANTITATIVE: CPT | Performed by: INTERNAL MEDICINE

## 2024-03-18 ENCOUNTER — PROCEDURE VISIT (OUTPATIENT)
Dept: OPHTHALMOLOGY | Facility: CLINIC | Age: 79
End: 2024-03-18
Payer: MEDICARE

## 2024-03-18 DIAGNOSIS — H25.13 NS (NUCLEAR SCLEROSIS), BILATERAL: ICD-10-CM

## 2024-03-18 DIAGNOSIS — D23.10 PAPILLOMA OF RIGHT EYELID: Primary | ICD-10-CM

## 2024-03-18 PROCEDURE — 99499 UNLISTED E&M SERVICE: CPT | Mod: S$PBB,,, | Performed by: OPHTHALMOLOGY

## 2024-03-19 NOTE — PROGRESS NOTES
Subjective:       Patient ID: Loni Heard is a 78 y.o. female.    Chief Complaint: Follow-up (**Patient left before seeing doctor in clinic.**)    HPI     Follow-up     Additional comments: **Patient left before seeing doctor in clinic.**           Comments    DLS: 12/18/23    Gtts: AT's PRN OU     POHx:   1. NS (nuclear sclerosis), bilateral   2. R efractive error     Pt here for RUL papilloma excision.  Pt states she was leaning her lomeli in   her kitchen and got some liquid in OD.  Pt rinsed the eye out and states   it is better now. Pt states OD was red this morning.          Last edited by Sofi Prasad on 3/18/2024  4:29 PM.             Assessment:       1. Papilloma of right eyelid    2. NS (nuclear sclerosis), bilateral        Plan:       RUL papilloma-Pt left before procedure.  Cataracts- Not visually significant.      RTC prn for RLL papilloma excision.

## 2024-04-25 ENCOUNTER — OFFICE VISIT (OUTPATIENT)
Dept: ENDOCRINOLOGY | Facility: CLINIC | Age: 79
End: 2024-04-25
Payer: MEDICARE

## 2024-04-25 ENCOUNTER — TELEPHONE (OUTPATIENT)
Dept: ENDOCRINOLOGY | Facility: CLINIC | Age: 79
End: 2024-04-25
Payer: COMMERCIAL

## 2024-04-25 VITALS
BODY MASS INDEX: 28.63 KG/M2 | DIASTOLIC BLOOD PRESSURE: 90 MMHG | WEIGHT: 167.69 LBS | HEIGHT: 64 IN | SYSTOLIC BLOOD PRESSURE: 132 MMHG

## 2024-04-25 DIAGNOSIS — E11.9 TYPE 2 DIABETES MELLITUS WITHOUT COMPLICATION, WITHOUT LONG-TERM CURRENT USE OF INSULIN: Primary | ICD-10-CM

## 2024-04-25 DIAGNOSIS — E78.49 OTHER HYPERLIPIDEMIA: ICD-10-CM

## 2024-04-25 DIAGNOSIS — M81.0 OSTEOPOROSIS, UNSPECIFIED OSTEOPOROSIS TYPE, UNSPECIFIED PATHOLOGICAL FRACTURE PRESENCE: ICD-10-CM

## 2024-04-25 PROCEDURE — 99214 OFFICE O/P EST MOD 30 MIN: CPT | Mod: PBBFAC | Performed by: INTERNAL MEDICINE

## 2024-04-25 PROCEDURE — 99999 PR PBB SHADOW E&M-EST. PATIENT-LVL IV: CPT | Mod: PBBFAC,,, | Performed by: INTERNAL MEDICINE

## 2024-04-25 PROCEDURE — 99204 OFFICE O/P NEW MOD 45 MIN: CPT | Mod: S$PBB,,, | Performed by: INTERNAL MEDICINE

## 2024-04-25 RX ORDER — CHOLECALCIFEROL (VITAMIN D3) 50 MCG
1 TABLET ORAL DAILY
Start: 2024-04-25

## 2024-04-25 NOTE — PROGRESS NOTES
ENDOCRINOLOGY CLINIC NEW PATIENT NOTE  04/25/2024     Subjective:      Reason for referal: referred by Mariaelena Mallory* for evaluation/management of bone health    HPI:   Loni Heard is a 78 y.o. female with hx of T2DM, hyperlipidemia, hypertension, osteoporosis, vitD deficiency  who presents for evaluation of  bone health.     Patient denies any falls/fractures in the past year.  She reports that her mother was on fosamax and after stopping had hip fracture.    Previously on letrazole for breast cancer  Also treated with radiation     Dexa 10/17/23:  Lumbar spine (L1-L4):               BMD is 0.934 g/cm2, T-score is -1.0, and Z-score is 1.6.  Total hip:                                BMD is 0.755 g/cm2, T-score is -1.5, and Z-score is 0.5.  Femoral neck:                          BMD is 0.708 g/cm2, T-score is -1.3, and Z-score is 1.0.  Distal 1/3 radius:                      Not applicable    FRAX:  20% risk of a major osteoporotic fracture in the next 10 years.  11% risk of hip fracture in the next 10 years.        Secondary osteoporosis workup:   postmenopausal estrogen deficiency    Lab Results   Component Value Date    CALCIUM 10.3 01/18/2024    ALBUMIN 4.0 01/18/2024    ESTGFRAFRICA >60.0 05/17/2022    EGFRNONAA >60.0 05/17/2022    ALKPHOS 69 01/18/2024    [id number] 38 05/16/2023    PTH 55.0 12/04/2020    TSH 2.839 08/07/2019      Previous Treatment:   none  History of previous fracture: Yes patella and ankle fractures  Parent with fractured hip: Yes mother in 90s  Smoking status: No  Glucocorticoid use: No  History of RA: No  Alcohol 3 or more/day: No  Nephrolithiasis: No  Dental up to date: yes, no upcoming procedures   GERD: + in the pat but now controled with diet changes  Supplements:   Ca: not taking supplement.  Getting 2-3 servings of diary/day   VitD: over the counter 2000 IU daily     Exercise: stays active, walking     T2DM well controled and managed by primary care provider   On  "januvia only   Lab Results   Component Value Date    HGBA1C 6.5 (H) 01/18/2024        On Crestor 40 mg daily  (increased in 2/2024)  Lab Results   Component Value Date    CHOL 183 09/22/2023    TRIG 125 09/22/2023    HDL 53 09/22/2023    LDLCALC 105.0 09/22/2023    CHOLHDL 29.0 09/22/2023            Current Outpatient Medications:     amLODIPine (NORVASC) 5 MG tablet, Take 1 tablet (5 mg total) by mouth once daily., Disp: 90 tablet, Rfl: 3    blood sugar diagnostic Strp, Use for blood sugar testing once daily -once touch ultra test strips, Disp: 50 each, Rfl: 4    blood-glucose meter Misc, Use for blood sugar testing as instructed once daily - product selection permitted, Disp: 1 each, Rfl: 0    cholecalciferol, vitamin D3, (VITAMIN D3) 2,000 unit Tab, Take 2 tablets (4,000 Units total) by mouth once daily., Disp: , Rfl:     coenzyme Q10 10 mg capsule, Take 10 mg by mouth., Disp: , Rfl:     cyanocobalamin 1,000 mcg/mL injection, Inject 1000 mcg into the muscle twice monthly., Disp: 9 mL, Rfl: 11    estradioL (VAGIFEM) 10 mcg Tab, Place 1 tablet vaginally twice a week., Disp: , Rfl:     lancets Misc, Use for blood sugar testing once daily - one touch delica lanets 30 g, Disp: 50 each, Rfl: 3    magnesium 250 mg Tab, Take by mouth., Disp: , Rfl:     rosuvastatin (CRESTOR) 40 MG Tab, Take 1 tablet (40 mg total) by mouth every evening., Disp: 90 tablet, Rfl: 3    SITagliptin phosphate (JANUVIA) 100 MG Tab, TAKE 1 TABLET BY MOUTH EVERY DAY, Disp: 90 tablet, Rfl: 1    syringe with needle, safety (BD INTEGRA SYRINGE) 3 mL 25 gauge x 1" Syrg, for use with b 2 injection, Disp: 100 Syringe, Rfl: 6    vit C/E/Zn/coppr/lutein/zeaxan (PRESERVISION AREDS-2 ORAL), , Disp: , Rfl:     VITAMIN K2 ORAL, Take by mouth., Disp: , Rfl:     Current Facility-Administered Medications:     cyanocobalamin injection 1,000 mcg, 1,000 mcg, Intramuscular, Q14 Days, Mariaelena Mallory MD      ROS: see HPI     Objective:   Physical Exam "   There were no vitals taken for this visit.  Wt Readings from Last 3 Encounters:   02/05/24 77.7 kg (171 lb 4.8 oz)   01/29/24 75.9 kg (167 lb 5.3 oz)   09/25/23 78.1 kg (172 lb 2.9 oz)   ]    Constitutional:  Pleasant,  in no acute distress.   HENT:   Eyes:     No scleral icterus.   Respiratory:   Effort normal   Neurological:  normal speech  Psych:  Normal mood and affect.      LABORATORY REVIEW:    Chemistry        Component Value Date/Time     01/18/2024 0728    K 4.5 01/18/2024 0728     01/18/2024 0728    CO2 27 01/18/2024 0728    BUN 15 01/18/2024 0728    CREATININE 0.9 01/18/2024 0728     (H) 01/18/2024 0728        Component Value Date/Time    CALCIUM 10.3 01/18/2024 0728    ALKPHOS 69 01/18/2024 0728    AST 22 01/18/2024 0728    ALT 19 01/18/2024 0728    BILITOT 0.7 01/18/2024 0728    ESTGFRAFRICA >60.0 05/17/2022 0746    EGFRNONAA >60.0 05/17/2022 0746          Assessment/Plan:     Problem List Items Addressed This Visit       Hyperlipidemia     Continue Crestor 40 mg daily         Osteoporosis     Patient with osteoporosis based on FRAX scores.  Reviewed that she is at high-risk for fracture so recommend using anti resorptive agent.  She is hesitant to start Fosamax due to history of acid reflux and her mother had.  Reclast would be an alternative option given history of GERD and normal kidney function.  She will review written information about these 2 medications so given today as well as work on optimizing calcium intake.  If she would like to proceed with medical management she will reach out to me or her primary care provider.  Need repeat bone density scan every 2 years.         Type 2 diabetes mellitus - Primary     Controlled on 1 agent, continue management per primary care provider          Return to clinic prdoug Rocha MD

## 2024-04-25 NOTE — TELEPHONE ENCOUNTER
Spoke with patient asking her can she come in for 1:30 instead of 11 am she agreed . I did write a note in appt. Notes informing  .

## 2024-04-25 NOTE — PATIENT INSTRUCTIONS
For calcium intake:  I advise you get 1200 mg per day of calcium, split up over the day into 2 to 4 divided doses.  This amount includes calcium from both dietary sources and/or calcium supplements, and it is best to get smaller amounts throughout the day rather than all of the calcium at one time; this allows for better absorption of the calcium.     To estimate calcium content from a nutrition label, the label lists the % calcium in that food.   For example, the label for an 8 oz glass of milk lists the calcium content as 30%.  This is equivalent to 300 mg of calcium (multiply the % by 10 to get the mg of calcium per serving).  It is best to try to get the majority of calcium intake from the diet.  I've included a table below of some calcium-rich foods.    If you are not getting enough calcium in the diet, then you can add low doses of calcium supplements.  For calcium supplements, your body can only absorb about 500-600 mg of calcium at one time.  Thus, it is best to split the tablets up over the course of a day.  It is also best to avoid taking calcium supplements at the same time as a calcium-rich food to maximize your calcium absorption.  Calcium carbonate is best taken with or after a meal.  Calcium citrate can be taken on an empty stomach or with/after a meal.  Please look at any multivitamin you are taking as these often usually contain calcium as well.    Estimated Calcium Content of Foods:  Produce  Serving Size Estimated Calcium*    Laura greens, frozen 8 oz 360 mg   Broccoli william 8 oz 200 mg   Kale, frozen 8 oz 180 mg   Soy Beans, green, boiled 8 oz 175 mg   Bok Zoe, cooked, boiled 8 oz 160 mg   Figs, dried 2 figs 65 mg   Broccoli, fresh, cooked 8 oz 60 mg   Oranges 1 whole 55 mg   Seafood Serving Size Estimated Calcium*    Sardines, canned with bones 3 oz 325 mg   [place], canned with bones 3 oz 180 mg   Shrimp, canned 3 oz 125 mg   Dairy Serving Size Estimated Calcium*    Ricotta, part-skim 4 oz  335 mg   Yogurt, plain, low-fat 6 oz 310 mg   Milk, skim, low-fat, whole 8 oz 300 mg   Yogurt with fruit, low-fat 6 oz 260 mg   Mozzarella, part-skim 1 oz 210 mg   Cheddar 1 oz 205 mg   Yogurt, Greek 6 oz 200 mg   American Cheese 1 oz 195 mg   Feta Cheese 4 oz 140 mg   Cottage Cheese, 2% 4 oz 105 mg   Frozen yogurt, vanilla 8 oz 105 mg   Ice Cream, vanilla 8 oz 85 mg   Parmesan 1 tbsp 55 mg   Fortified Food Serving Size Estimated Calcium*   [place] milk, rice milk or soy milk, fortified 8 oz 300 mg   Orange juice and other fruit juices, fortified 8 oz 300 mg   Tofu, prepared with calcium 4 oz 205 mg   Waffle, frozen, fortified 2 pieces 200 mg   Oatmeal, fortified 1 packet 140 mg   English muffin, fortified 1 muffin 100 mg   Cereal, fortified 8 oz 100-1,000 mg   Other Serving Size Estimated Calcium*   Mac & cheese, frozen 1 package 325 mg   Pizza, cheese, frozen 1 serving 115 mg   Pudding, chocolate, prepared with 2% milk 4 oz 160 mg   Beans, baked, canned 4 oz 160 mg   *The calcium content listed for most foods is estimated and can vary due to multiple factors. Check the food label to determine how much calcium is in a particular product.  If you read the nutrition label for a food source, it lists the % calcium in that food.  For an 8 oz glass of milk, for example, the label states calcium 30%.  This is equivalent to 300 mg of calcium (multiply the listed number by 10).   **Table from the [employer]

## 2024-04-25 NOTE — ASSESSMENT & PLAN NOTE
Patient with osteoporosis based on FRAX scores.  Reviewed that she is at high-risk for fracture so recommend using anti resorptive agent.  She is hesitant to start Fosamax due to history of acid reflux and her mother had.  Reclast would be an alternative option given history of GERD and normal kidney function.  She will review written information about these 2 medications so given today as well as work on optimizing calcium intake.  If she would like to proceed with medical management she will reach out to me or her primary care provider.  Need repeat bone density scan every 2 years.

## 2024-05-24 ENCOUNTER — LAB VISIT (OUTPATIENT)
Dept: LAB | Facility: HOSPITAL | Age: 79
End: 2024-05-24
Attending: INTERNAL MEDICINE
Payer: MEDICARE

## 2024-05-24 DIAGNOSIS — E11.9 TYPE 2 DIABETES MELLITUS WITHOUT COMPLICATION, WITHOUT LONG-TERM CURRENT USE OF INSULIN: ICD-10-CM

## 2024-05-24 DIAGNOSIS — E78.49 OTHER HYPERLIPIDEMIA: ICD-10-CM

## 2024-05-24 DIAGNOSIS — I10 PRIMARY HYPERTENSION: ICD-10-CM

## 2024-05-24 LAB
ALBUMIN SERPL BCP-MCNC: 3.7 G/DL (ref 3.5–5.2)
ALP SERPL-CCNC: 80 U/L (ref 55–135)
ALT SERPL W/O P-5'-P-CCNC: 17 U/L (ref 10–44)
ANION GAP SERPL CALC-SCNC: 6 MMOL/L (ref 8–16)
AST SERPL-CCNC: 22 U/L (ref 10–40)
BASOPHILS # BLD AUTO: 0.02 K/UL (ref 0–0.2)
BASOPHILS NFR BLD: 0.3 % (ref 0–1.9)
BILIRUB SERPL-MCNC: 0.5 MG/DL (ref 0.1–1)
BUN SERPL-MCNC: 12 MG/DL (ref 8–23)
CALCIUM SERPL-MCNC: 9.2 MG/DL (ref 8.7–10.5)
CHLORIDE SERPL-SCNC: 105 MMOL/L (ref 95–110)
CHOLEST SERPL-MCNC: 143 MG/DL (ref 120–199)
CHOLEST SERPL-MCNC: 143 MG/DL (ref 120–199)
CHOLEST/HDLC SERPL: 2.8 {RATIO} (ref 2–5)
CHOLEST/HDLC SERPL: 2.8 {RATIO} (ref 2–5)
CO2 SERPL-SCNC: 27 MMOL/L (ref 23–29)
CREAT SERPL-MCNC: 0.9 MG/DL (ref 0.5–1.4)
DIFFERENTIAL METHOD BLD: NORMAL
EOSINOPHIL # BLD AUTO: 0.1 K/UL (ref 0–0.5)
EOSINOPHIL NFR BLD: 0.8 % (ref 0–8)
ERYTHROCYTE [DISTWIDTH] IN BLOOD BY AUTOMATED COUNT: 12.3 % (ref 11.5–14.5)
EST. GFR  (NO RACE VARIABLE): >60 ML/MIN/1.73 M^2
ESTIMATED AVG GLUCOSE: 148 MG/DL (ref 68–131)
GLUCOSE SERPL-MCNC: 138 MG/DL (ref 70–110)
HBA1C MFR BLD: 6.8 % (ref 4–5.6)
HCT VFR BLD AUTO: 38.1 % (ref 37–48.5)
HDLC SERPL-MCNC: 51 MG/DL (ref 40–75)
HDLC SERPL-MCNC: 51 MG/DL (ref 40–75)
HDLC SERPL: 35.7 % (ref 20–50)
HDLC SERPL: 35.7 % (ref 20–50)
HGB BLD-MCNC: 12.5 G/DL (ref 12–16)
IMM GRANULOCYTES # BLD AUTO: 0.03 K/UL (ref 0–0.04)
IMM GRANULOCYTES NFR BLD AUTO: 0.5 % (ref 0–0.5)
LDLC SERPL CALC-MCNC: 74.2 MG/DL (ref 63–159)
LDLC SERPL CALC-MCNC: 74.2 MG/DL (ref 63–159)
LYMPHOCYTES # BLD AUTO: 2 K/UL (ref 1–4.8)
LYMPHOCYTES NFR BLD: 33.2 % (ref 18–48)
MCH RBC QN AUTO: 29.9 PG (ref 27–31)
MCHC RBC AUTO-ENTMCNC: 32.8 G/DL (ref 32–36)
MCV RBC AUTO: 91 FL (ref 82–98)
MONOCYTES # BLD AUTO: 0.6 K/UL (ref 0.3–1)
MONOCYTES NFR BLD: 9.5 % (ref 4–15)
NEUTROPHILS # BLD AUTO: 3.3 K/UL (ref 1.8–7.7)
NEUTROPHILS NFR BLD: 55.7 % (ref 38–73)
NONHDLC SERPL-MCNC: 92 MG/DL
NONHDLC SERPL-MCNC: 92 MG/DL
NRBC BLD-RTO: 0 /100 WBC
PLATELET # BLD AUTO: 211 K/UL (ref 150–450)
PMV BLD AUTO: 11.3 FL (ref 9.2–12.9)
POTASSIUM SERPL-SCNC: 4.4 MMOL/L (ref 3.5–5.1)
PROT SERPL-MCNC: 6.4 G/DL (ref 6–8.4)
RBC # BLD AUTO: 4.18 M/UL (ref 4–5.4)
SODIUM SERPL-SCNC: 138 MMOL/L (ref 136–145)
TRIGL SERPL-MCNC: 89 MG/DL (ref 30–150)
TRIGL SERPL-MCNC: 89 MG/DL (ref 30–150)
WBC # BLD AUTO: 6 K/UL (ref 3.9–12.7)

## 2024-05-24 PROCEDURE — 36415 COLL VENOUS BLD VENIPUNCTURE: CPT | Performed by: INTERNAL MEDICINE

## 2024-05-24 PROCEDURE — 85025 COMPLETE CBC W/AUTO DIFF WBC: CPT | Performed by: INTERNAL MEDICINE

## 2024-05-24 PROCEDURE — 80053 COMPREHEN METABOLIC PANEL: CPT | Performed by: INTERNAL MEDICINE

## 2024-05-24 PROCEDURE — 83036 HEMOGLOBIN GLYCOSYLATED A1C: CPT | Performed by: INTERNAL MEDICINE

## 2024-05-24 PROCEDURE — 80061 LIPID PANEL: CPT | Performed by: INTERNAL MEDICINE

## 2024-05-29 ENCOUNTER — OFFICE VISIT (OUTPATIENT)
Dept: INTERNAL MEDICINE | Facility: CLINIC | Age: 79
End: 2024-05-29
Payer: MEDICARE

## 2024-05-29 ENCOUNTER — HOSPITAL ENCOUNTER (OUTPATIENT)
Dept: CARDIOLOGY | Facility: HOSPITAL | Age: 79
Discharge: HOME | End: 2024-05-29
Attending: INTERNAL MEDICINE
Payer: MEDICARE

## 2024-05-29 VITALS
SYSTOLIC BLOOD PRESSURE: 130 MMHG | WEIGHT: 169.31 LBS | HEIGHT: 64 IN | BODY MASS INDEX: 28.91 KG/M2 | OXYGEN SATURATION: 98 % | HEART RATE: 74 BPM | DIASTOLIC BLOOD PRESSURE: 82 MMHG

## 2024-05-29 DIAGNOSIS — Z85.3 HX: BREAST CANCER: ICD-10-CM

## 2024-05-29 DIAGNOSIS — E78.5 HYPERLIPIDEMIA, UNSPECIFIED HYPERLIPIDEMIA TYPE: ICD-10-CM

## 2024-05-29 DIAGNOSIS — R09.89 BRUIT: ICD-10-CM

## 2024-05-29 DIAGNOSIS — E53.8 B12 DEFICIENCY: ICD-10-CM

## 2024-05-29 DIAGNOSIS — E55.9 MILD VITAMIN D DEFICIENCY: ICD-10-CM

## 2024-05-29 DIAGNOSIS — R42 LIGHT HEADEDNESS: ICD-10-CM

## 2024-05-29 DIAGNOSIS — I10 ESSENTIAL HYPERTENSION: ICD-10-CM

## 2024-05-29 DIAGNOSIS — E11.9 TYPE 2 DIABETES MELLITUS WITHOUT COMPLICATION, WITHOUT LONG-TERM CURRENT USE OF INSULIN: Primary | ICD-10-CM

## 2024-05-29 LAB
LEFT CBA DIAS: 12 CM/S
LEFT CBA SYS: 74 CM/S
LEFT CCA DIST DIAS: 14 CM/S
LEFT CCA DIST SYS: 75 CM/S
LEFT CCA MID DIAS: 15 CM/S
LEFT CCA MID SYS: 92 CM/S
LEFT CCA PROX DIAS: 18 CM/S
LEFT CCA PROX SYS: 104 CM/S
LEFT ECA DIAS: 9 CM/S
LEFT ECA SYS: 108 CM/S
LEFT ICA DIST DIAS: 22 CM/S
LEFT ICA DIST SYS: 69 CM/S
LEFT ICA MID DIAS: 17 CM/S
LEFT ICA MID SYS: 63 CM/S
LEFT ICA PROX DIAS: 17 CM/S
LEFT ICA PROX SYS: 64 CM/S
LEFT VERTEBRAL DIAS: 12 CM/S
LEFT VERTEBRAL SYS: 39 CM/S
OHS CV CAROTID RIGHT ICA EDV HIGHEST: 18
OHS CV CAROTID ULTRASOUND LEFT ICA/CCA RATIO: 0.92
OHS CV CAROTID ULTRASOUND RIGHT ICA/CCA RATIO: 1.08
OHS CV PV CAROTID LEFT HIGHEST CCA: 104
OHS CV PV CAROTID LEFT HIGHEST ICA: 69
OHS CV PV CAROTID RIGHT HIGHEST CCA: 98
OHS CV PV CAROTID RIGHT HIGHEST ICA: 77
OHS CV US CAROTID LEFT HIGHEST EDV: 22
RIGHT ARM DIASTOLIC BLOOD PRESSURE: 67 MMHG
RIGHT ARM SYSTOLIC BLOOD PRESSURE: 159 MMHG
RIGHT CBA DIAS: 13 CM/S
RIGHT CBA SYS: 80 CM/S
RIGHT CCA DIST DIAS: 14 CM/S
RIGHT CCA DIST SYS: 71 CM/S
RIGHT CCA MID DIAS: 13 CM/S
RIGHT CCA MID SYS: 81 CM/S
RIGHT CCA PROX DIAS: 18 CM/S
RIGHT CCA PROX SYS: 98 CM/S
RIGHT ECA DIAS: 8 CM/S
RIGHT ECA SYS: 81 CM/S
RIGHT ICA DIST DIAS: 18 CM/S
RIGHT ICA DIST SYS: 58 CM/S
RIGHT ICA MID DIAS: 17 CM/S
RIGHT ICA MID SYS: 77 CM/S
RIGHT ICA PROX DIAS: 11 CM/S
RIGHT ICA PROX SYS: 62 CM/S
RIGHT VERTEBRAL DIAS: 13 CM/S
RIGHT VERTEBRAL SYS: 47 CM/S

## 2024-05-29 PROCEDURE — 93880 EXTRACRANIAL BILAT STUDY: CPT | Mod: 26,,, | Performed by: INTERNAL MEDICINE

## 2024-05-29 PROCEDURE — 99214 OFFICE O/P EST MOD 30 MIN: CPT | Mod: S$PBB,,, | Performed by: INTERNAL MEDICINE

## 2024-05-29 PROCEDURE — 93880 EXTRACRANIAL BILAT STUDY: CPT

## 2024-05-29 PROCEDURE — 99999 PR PBB SHADOW E&M-EST. PATIENT-LVL IV: CPT | Mod: PBBFAC,,, | Performed by: INTERNAL MEDICINE

## 2024-05-29 PROCEDURE — 99214 OFFICE O/P EST MOD 30 MIN: CPT | Mod: PBBFAC,25 | Performed by: INTERNAL MEDICINE

## 2024-05-29 NOTE — PROGRESS NOTES
"Subjective:       Patient ID: Loni Heard is a 78 y.o. female.    Chief Complaint: Follow-up  This is a 78-year-old who presents today for follow-up she reports that she did go see endocrinology for now is trying to continue with her exercise vitamin-D and calcium she has been taking her cholesterol medicine in his tolerating so far she reports they did increase her rosuvastatin and she had ultrasound earlier today reports she was a bit nervous during the exam when they were imaging she reports taking off this summer from school.  She is trying to be more active since getting out of school and doing more gardening.  She is back to taking her B12 usually will take it monthly she has h had improvement in her knees overall she did see endocrinology for her osteoporosis  She is felt better since she is off her letrozole she has not been taking amlodipine for her blood pressure has not been watching her pressure as much regularly but felt her pressure was doing better since she was off the letrozole as well    Follow-up      Review of Systems   Constitutional:         Fatigue with gardeniing in heat    Gastrointestinal:         Diarrhea resolved       Objective:    Blood pressure 130/82, pulse 74, height 5' 4" (1.626 m), weight 76.8 kg (169 lb 5 oz), SpO2 98%.   Physical Exam  Constitutional:       General: She is not in acute distress.  HENT:      Head: Normocephalic.      Mouth/Throat:      Pharynx: Oropharynx is clear.   Cardiovascular:      Rate and Rhythm: Normal rate and regular rhythm.      Heart sounds: Normal heart sounds. No murmur heard.     No friction rub. No gallop.      Comments: Surgical changes radiation changes left breast   Pulmonary:      Effort: Pulmonary effort is normal. No respiratory distress.      Breath sounds: Normal breath sounds.   Abdominal:      General: Bowel sounds are normal.      Palpations: Abdomen is soft. There is no mass.      Tenderness: There is no abdominal tenderness. " "  Neurological:      Mental Status: She is alert.         Assessment:       1. Type 2 diabetes mellitus without complication, without long-term current use of insulin    2. Hyperlipidemia, unspecified hyperlipidemia type    3. HX: breast cancer    4. B12 deficiency    5. Mild vitamin D deficiency        Plan:       Loni Deluca" was seen today for follow-up.    Diagnoses and all orders for this visit:    Type 2 diabetes mellitus without complication, without long-term current use of insulin  She remains on Januvia A1c slight trend up will maintain current regimen continue to try to stay active she has been doing more gardening recently  -     CBC Auto Differential; Future  -     Comprehensive Metabolic Panel; Future  -     Hemoglobin A1C; Future  -     Lipid Panel; Future  -     Vitamin B12; Future  -     Vitamin D; Future    Hyperlipidemia, unspecified hyperlipidemia type  She is taking rosuvastatin and tolerating increased dose following with Cardiology he due to  increased cardiac risk score  Some improvement in ldl  -     CBC Auto Differential; Future  -     Comprehensive Metabolic Panel; Future  -     Hemoglobin A1C; Future  -     Lipid Panel; Future  -     Vitamin B12; Future  -     Vitamin D; Future    HX: breast cancer  She follows with breast surgery and does her annual mammograms she will schedule her follow-up when due as well as with her gynecologist    B12 deficiency  History of she does B12 injections monthly   Due to atrophic gastritis she is recently seen her gastroenterologist due to persistent diarrhea those symptoms did resolve  -     Vitamin B12; Future    Mild vitamin D deficiency  Remains on vitamin-D will check with next blood work  -     Vitamin D; Future    Follow-up 4 months with labs  Discussed RSV and shingles vaccines            "

## 2024-06-10 ENCOUNTER — PATIENT MESSAGE (OUTPATIENT)
Dept: INTERNAL MEDICINE | Facility: CLINIC | Age: 79
End: 2024-06-10
Payer: COMMERCIAL

## 2024-06-11 ENCOUNTER — ON-DEMAND VIRTUAL (OUTPATIENT)
Dept: URGENT CARE | Facility: CLINIC | Age: 79
End: 2024-06-11
Payer: COMMERCIAL

## 2024-06-11 DIAGNOSIS — R10.9 ABDOMINAL PAIN, UNSPECIFIED ABDOMINAL LOCATION: ICD-10-CM

## 2024-06-11 DIAGNOSIS — R19.7 DIARRHEA, UNSPECIFIED TYPE: ICD-10-CM

## 2024-06-11 DIAGNOSIS — R50.9 FEVER, UNSPECIFIED FEVER CAUSE: Primary | ICD-10-CM

## 2024-06-11 PROCEDURE — 99213 OFFICE O/P EST LOW 20 MIN: CPT | Mod: 95,,, | Performed by: PHYSICIAN ASSISTANT

## 2024-06-11 NOTE — PATIENT INSTRUCTIONS
Continue to drink small sips of fluids every five minutes (water, gatorade, pedialyte) and then advance to bland diet as tolerated. Recommend BRAT diet.    If no improvement tomorrow please be seen in person at local urgent care for further evaluation. If abdominal pain returns or you develop bloody stools, vomiting, lightheadedness/dizziness or other new symptoms go to the emergency room.    You must understand that you've received a Telehealth Urgent Care treatment only and that you may be released before all your medical problems are known or treated. You, the patient, will arrange for follow up care as instructed.???

## 2024-06-11 NOTE — PROGRESS NOTES
Subjective:      Patient ID: Loni Heard is a 78 y.o. female.    Vitals:  vitals were not taken for this visit.     Chief Complaint: Abdominal Pain (Fever, one episode of diarrhea this morning)      Visit Type: TELE AUDIOVISUAL    Present with the patient at the time of consultation: TELEMED PRESENT WITH PATIENT: None at home in [place]    Past Medical History:   Diagnosis Date    Allergy     hx rhinitis     Asthma     childhood asthma     Atrophic gastritis     B12 deficiency     Cancer     left breast invasive ductal carcinoma     Cataract     Depression     Diabetes mellitus type II     Hyperlipidemia     Hypertension     Mild vitamin D deficiency     Pulmonary nodule     micronodule 8/2017      Past Surgical History:   Procedure Laterality Date    BREAST SURGERY Left 07/2018    left breast lumpectomy     eyelid cyst removed      foreign body removed upper airway       left ankle fracture and repair/orif       neuroma removed      right foot     TONSILLECTOMY      UPPER GASTROINTESTINAL ENDOSCOPY       Review of patient's allergies indicates:   Allergen Reactions    Codeine Anaphylaxis     Other reaction(s): Anaphylaxis    Penicillins      Other reaction(s): dizzy     Niacin preparations Other (See Comments)     Flushing/lips swelling      Current Outpatient Medications on File Prior to Visit   Medication Sig Dispense Refill    amLODIPine (NORVASC) 5 MG tablet Take 1 tablet (5 mg total) by mouth once daily. 90 tablet 3    blood sugar diagnostic Strp Use for blood sugar testing once daily -once touch ultra test strips 50 each 4    blood-glucose meter Misc Use for blood sugar testing as instructed once daily - product selection permitted 1 each 0    cholecalciferol, vitamin D3, (VITAMIN D3) 50 mcg (2,000 unit) Tab Take 1 tablet (2,000 Units total) by mouth once daily.      coenzyme Q10 10 mg capsule Take 10 mg by mouth.      cyanocobalamin 1,000 mcg/mL injection Inject 1000 mcg into the muscle twice monthly. 9  "mL 11    estradioL (VAGIFEM) 10 mcg Tab Place 1 tablet vaginally twice a week.      lancets Misc Use for blood sugar testing once daily - one touch delica lanets 30 g 50 each 3    magnesium 250 mg Tab Take by mouth.      rosuvastatin (CRESTOR) 40 MG Tab Take 1 tablet (40 mg total) by mouth every evening. 90 tablet 3    SITagliptin phosphate (JANUVIA) 100 MG Tab TAKE 1 TABLET BY MOUTH EVERY DAY 90 tablet 1    syringe with needle, safety (BD INTEGRA SYRINGE) 3 mL 25 gauge x 1" Syrg for use with b 2 injection 100 Syringe 6    vit C/E/Zn/coppr/lutein/zeaxan (PRESERVISION AREDS-2 ORAL)        Current Facility-Administered Medications on File Prior to Visit   Medication Dose Route Frequency Provider Last Rate Last Admin    cyanocobalamin injection 1,000 mcg  1,000 mcg Intramuscular Q14 Days Mariaelena Mallory MD         Family History   Problem Relation Name Age of Onset    Parkinsonism Mother      Dementia Mother      Stroke Father      Diabetes Father      Diabetes Brother      Thyroid disease Paternal Uncle      Cataracts Maternal Grandfather      Amblyopia Neg Hx      Blindness Neg Hx      Cancer Neg Hx      Glaucoma Neg Hx      Hypertension Neg Hx      Macular degeneration Neg Hx      Retinal detachment Neg Hx      Strabismus Neg Hx             Ohs Peq Odvv Intake    6/11/2024  3:41 PM CDT - Filed by Patient   What is your current physical address in the event of a medical emergency? Home  [address] [place] [address]   Are you able to take your vital signs? No   Please attach any relevant images or files          HPI  79yo female presents with c/o intermittent stabbing abdominal pain, low appetite x this morning. Had one episode of loose diarrhea this morning, stringy in appearance without blood. Now with chills, fever (101.3), body aches. Has been able to drink fluids and take tylenol. States abdominal pain has subsided. Denies nausea, vomiting, diarrhea, urinary symptoms, melena.     Similar pain few months ago - had " negative stool testing at that time. But no fever at that time.     No known sick contacts, new/unusual foods. Water had sulfur smell, intermittent for a while, not new - uses filter for the water.     Her massage lady she saw this morning said some of her clients had similar stomach bugs.       Constitution: Positive for appetite change, chills and fever.        +malaise   HENT: Negative.     Cardiovascular: Negative.    Respiratory: Negative.     Gastrointestinal:  Positive for abdominal pain and diarrhea. Negative for nausea, vomiting, bright red blood in stool and dark colored stools.   Genitourinary: Negative.    Skin:  Negative for rash.   Neurological:  Negative for dizziness, light-headedness and headaches.        Objective:   The physical exam was conducted virtually.  Physical Exam   Constitutional: She is oriented to person, place, and time.  Non-toxic appearance. She does not appear ill. No distress.   HENT:   Head: Normocephalic and atraumatic.   Eyes: Conjunctivae are normal. No scleral icterus.   Neck: Neck supple.   Pulmonary/Chest: Effort normal. No respiratory distress.   Abdominal: Normal appearance. She exhibits no distension. Soft. There is no abdominal tenderness.   Neurological: She is alert and oriented to person, place, and time. Coordination normal.   Skin: Skin is dry, not diaphoretic, not pale and no rash. jaundice  Psychiatric: Her behavior is normal. Judgment and thought content normal.       Assessment:     1. Fever, unspecified fever cause    2. Abdominal pain, unspecified abdominal location    3. Diarrhea, unspecified type        Plan:       Fever, unspecified fever cause    Abdominal pain, unspecified abdominal location    Diarrhea, unspecified type      Continue to drink small sips of fluids every five minutes (water, gatorade, pedialyte) and then advance to bland diet as tolerated. Recommend BRAT diet.    If no improvement tomorrow please be seen in person at local urgent care for  further evaluation. If abdominal pain returns or you develop bloody stools, vomiting, lightheadedness/dizziness or other new symptoms go to the emergency room.    You must understand that you've received a Telehealth Urgent Care treatment only and that you may be released before all your medical problems are known or treated. You, the patient, will arrange for follow up care as instructed.???  Patient voiced understanding and agrees to plan.

## 2024-07-25 NOTE — TELEPHONE ENCOUNTER
No care due was identified.  [facility] Embedded Care Due Messages. Reference number: [id number].   7/25/2024 8:56:24 AM CDT

## 2024-07-25 NOTE — TELEPHONE ENCOUNTER
----- Message from Priti Carrero sent at 7/25/2024  9:06 AM CDT -----  Type:  Patient Requesting Referral    Who Called: pt   Does the patient already have the specialty appointment scheduled?:no  If yes, what is the date of that appointment?:n/a   Referral to What Specialty:Cardiologist  Reason for Referral:general cardiology   Does the patient want the referral with a specific physician?:no  [facility]   Patient Requesting a Response?:yes  Would the patient rather a call back or a response via [facility]?  call  Best Call Back Number: [phone]  Additional Information:Reason for visit: need new referral to Cardiologist department - Dr. Adair Carrillo retired

## 2024-07-25 NOTE — TELEPHONE ENCOUNTER
----- Message from Priti Carrero sent at 7/25/2024  9:06 AM CDT -----  Type:  Patient Requesting Referral    Who Called: pt   Does the patient already have the specialty appointment scheduled?:no  If yes, what is the date of that appointment?:n/a   Referral to What Specialty:Cardiologist  Reason for Referral:general cardiology   Does the patient want the referral with a specific physician?:no  [facility]   Patient Requesting a Response?:yes  Would the patient rather a call back or a response via [facility]?  call  Best Call Back Number: [phone]  Additional Information:Reason for visit: need new referral to Cardiologist department - Dr. Adair Carrillo retired

## 2024-08-21 ENCOUNTER — OFFICE VISIT (OUTPATIENT)
Dept: SURGERY | Facility: CLINIC | Age: 79
End: 2024-08-21
Payer: MEDICARE

## 2024-08-21 VITALS
WEIGHT: 167.13 LBS | HEIGHT: 64 IN | HEART RATE: 59 BPM | DIASTOLIC BLOOD PRESSURE: 77 MMHG | BODY MASS INDEX: 28.53 KG/M2 | SYSTOLIC BLOOD PRESSURE: 163 MMHG

## 2024-08-21 DIAGNOSIS — R10.32 LEFT GROIN PAIN: Primary | ICD-10-CM

## 2024-08-21 PROCEDURE — 99999 PR PBB SHADOW E&M-EST. PATIENT-LVL III: CPT | Mod: PBBFAC,,, | Performed by: SURGERY

## 2024-08-21 PROCEDURE — 99213 OFFICE O/P EST LOW 20 MIN: CPT | Mod: PBBFAC | Performed by: SURGERY

## 2024-08-21 NOTE — PROGRESS NOTES
[facility] H&P    Subjective:     Loni Heard is a 78 y.o. female with PMH of T2DM and HLD who presents to clinic for evaluation of possible inguinal hernia.     The patient reports developing sharp stabbing left groin pain 1.5 weeks ago after moving heavy bags of ice around her house.  The pain ultimately resolved on its own and has not returned.  Reports she was able to swim without any increase in pain.  She denies any bulging in her groin.  No fevers, chills, nausea, or vomiting.  No change in bowel movements.      PMH:   Past Medical History:   Diagnosis Date    Allergy     hx rhinitis     Asthma     childhood asthma     Atrophic gastritis     B12 deficiency     Cancer     left breast invasive ductal carcinoma     Cataract     Depression     Diabetes mellitus type II     Hyperlipidemia     Hypertension     Mild vitamin D deficiency     Pulmonary nodule     micronodule 8/2017        Past Surgical History:   Past Surgical History:   Procedure Laterality Date    BREAST SURGERY Left 07/2018    left breast lumpectomy     eyelid cyst removed      foreign body removed upper airway       left ankle fracture and repair/orif       neuroma removed      right foot     TONSILLECTOMY      UPPER GASTROINTESTINAL ENDOSCOPY         Social History:  Social History     Socioeconomic History    Marital status: Single   Tobacco Use    Smoking status: Never    Smokeless tobacco: Never   Substance and Sexual Activity    Alcohol use: No     Comment: rarely    Drug use: No    Sexual activity: Not Currently     Social Determinants of Health     Financial Resource Strain: Low Risk  (7/13/2024)    Received from [facility]    Overall Financial Resource Strain (CARDIA)     Difficulty of Paying Living Expenses: Not hard at all   Food Insecurity: No Food Insecurity (7/13/2024)    Received from [facility]    Hunger Vital Sign     Worried About Running Out of Food in the Last Year: Never true     Ran Out of  Food in the Last Year: Never true   Transportation Needs: No Transportation Needs (7/13/2024)    Received from [facility]    PRAPARE - Transportation     Lack of Transportation (Medical): No     Lack of Transportation (Non-Medical): No   Physical Activity: Insufficiently Active (7/13/2024)    Received from [facility]    Exercise Vital Sign     Days of Exercise per Week: 3 days     Minutes of Exercise per Session: 20 min   Stress: Patient Declined (7/13/2024)    Received from [facility]    [employer] - Occupational Stress Questionnaire     Feeling of Stress : Patient declined   Housing Stability: Low Risk  (1/22/2024)    Housing Stability Vital Sign     Unable to Pay for Housing in the Last Year: No     Number of Places Lived in the Last Year: 1     Unstable Housing in the Last Year: No       Allergies:   Review of patient's allergies indicates:   Allergen Reactions    Codeine Anaphylaxis     Other reaction(s): Anaphylaxis    Penicillins      Other reaction(s): dizzy     Niacin preparations Other (See Comments)     Flushing/lips swelling        Medications:  Current Outpatient Medications on File Prior to Visit   Medication Sig Dispense Refill    amLODIPine (NORVASC) 5 MG tablet Take 1 tablet (5 mg total) by mouth once daily. 90 tablet 3    blood sugar diagnostic Strp Use for blood sugar testing once daily -once touch ultra test strips 50 each 4    blood-glucose meter Misc Use for blood sugar testing as instructed once daily - product selection permitted 1 each 0    cholecalciferol, vitamin D3, (VITAMIN D3) 50 mcg (2,000 unit) Tab Take 1 tablet (2,000 Units total) by mouth once daily.      coenzyme Q10 10 mg capsule Take 10 mg by mouth.      cyanocobalamin 1,000 mcg/mL injection Inject 1000 mcg into the muscle twice monthly. 9 mL 11    estradioL (VAGIFEM) 10 mcg Tab Place 1 tablet vaginally twice a week.      lancets Misc Use for blood sugar testing once daily - one touch delica  "lanets 30 g 50 each 3    magnesium 250 mg Tab Take by mouth.      rosuvastatin (CRESTOR) 40 MG Tab Take 1 tablet (40 mg total) by mouth every evening. 90 tablet 3    SITagliptin phosphate (JANUVIA) 100 MG Tab TAKE 1 TABLET BY MOUTH EVERY DAY 90 tablet 0    syringe with needle, safety (BD INTEGRA SYRINGE) 3 mL 25 gauge x 1" Syrg for use with b 2 injection 100 Syringe 6    vit C/E/Zn/coppr/lutein/zeaxan (PRESERVISION AREDS-2 ORAL)        Current Facility-Administered Medications on File Prior to Visit   Medication Dose Route Frequency Provider Last Rate Last Admin    cyanocobalamin injection 1,000 mcg  1,000 mcg Intramuscular Q14 Days Mariaelena Mallory MD             Objective:     Vital Signs (Most Recent)       ROS A 10+ review of systems was performed with pertinent positives and negatives noted above in the history of present illness.  Other systems were negative unless otherwise specified.    Physical Exam:  Gen: awake, alert, in no acute distress  HEENT: normocephalic, atraumatic, EOMI, no scleral icterus  CV: regular rate and rhythm  Pulm: equal chest rise bilaterally, normal work of breathing  Abd:  soft, non-tender, no guarding. No palpable inguinal hernia, no bulging noted  Ext: WWP, skin warm and dry        Assessment:     Loni Heard is a 78 y.o. female with a PMH of T2DM and HLD presenting for evaluation of possible left inguinal hernia.  She previously had left groin pain, which has since resolved.  Denies any bulging in the area.  No evidence of hernia on exam, no bulge noted.  Discussed that if she develops worsening pain or notices a bulge, we could consider imaging the future, however no need intervention at this time.  The patient understands and is in agreement with the plan.  All questions were answered.  Plan:   - Imaging in the future if pain worsens, or if a bulge is noted  - RTC PRN      Diane Gallegos MD   General Surgery PGY-1      "

## 2024-08-29 ENCOUNTER — TELEPHONE (OUTPATIENT)
Dept: INTERNAL MEDICINE | Facility: CLINIC | Age: 79
End: 2024-08-29
Payer: MEDICARE

## 2024-08-29 NOTE — TELEPHONE ENCOUNTER
----- Message from Oriana Lawton sent at 8/29/2024 12:20 PM CDT -----  Regarding: Pt called to schedule an appt with the nurse for an RSV Injection and pt would like a call back to be seen on 8/30/24  Type:  Appointment Access Request    Name of Caller:Pt called to schedule an appt with the nurse for an RSV Injection and pt would like a call back to be seen on 8/30/24  Best Call Back Number:[phone]

## 2024-09-25 ENCOUNTER — LAB VISIT (OUTPATIENT)
Dept: LAB | Facility: HOSPITAL | Age: 79
End: 2024-09-25
Attending: INTERNAL MEDICINE
Payer: MEDICARE

## 2024-09-25 DIAGNOSIS — E11.9 TYPE 2 DIABETES MELLITUS WITHOUT COMPLICATION, WITHOUT LONG-TERM CURRENT USE OF INSULIN: ICD-10-CM

## 2024-09-25 DIAGNOSIS — E53.8 B12 DEFICIENCY: ICD-10-CM

## 2024-09-25 DIAGNOSIS — E55.9 MILD VITAMIN D DEFICIENCY: ICD-10-CM

## 2024-09-25 DIAGNOSIS — E78.5 HYPERLIPIDEMIA, UNSPECIFIED HYPERLIPIDEMIA TYPE: ICD-10-CM

## 2024-09-25 LAB
25(OH)D3+25(OH)D2 SERPL-MCNC: 35 NG/ML (ref 30–96)
ALBUMIN SERPL BCP-MCNC: 3.9 G/DL (ref 3.5–5.2)
ALP SERPL-CCNC: 72 U/L (ref 55–135)
ALT SERPL W/O P-5'-P-CCNC: 15 U/L (ref 10–44)
ANION GAP SERPL CALC-SCNC: 8 MMOL/L (ref 8–16)
AST SERPL-CCNC: 21 U/L (ref 10–40)
BASOPHILS # BLD AUTO: 0.02 K/UL (ref 0–0.2)
BASOPHILS NFR BLD: 0.3 % (ref 0–1.9)
BILIRUB SERPL-MCNC: 0.6 MG/DL (ref 0.1–1)
BUN SERPL-MCNC: 14 MG/DL (ref 8–23)
CALCIUM SERPL-MCNC: 10 MG/DL (ref 8.7–10.5)
CHLORIDE SERPL-SCNC: 106 MMOL/L (ref 95–110)
CHOLEST SERPL-MCNC: 186 MG/DL (ref 120–199)
CHOLEST/HDLC SERPL: 3 {RATIO} (ref 2–5)
CO2 SERPL-SCNC: 25 MMOL/L (ref 23–29)
CREAT SERPL-MCNC: 0.8 MG/DL (ref 0.5–1.4)
DIFFERENTIAL METHOD BLD: ABNORMAL
EOSINOPHIL # BLD AUTO: 0.1 K/UL (ref 0–0.5)
EOSINOPHIL NFR BLD: 0.8 % (ref 0–8)
ERYTHROCYTE [DISTWIDTH] IN BLOOD BY AUTOMATED COUNT: 12.4 % (ref 11.5–14.5)
EST. GFR  (NO RACE VARIABLE): >60 ML/MIN/1.73 M^2
ESTIMATED AVG GLUCOSE: 143 MG/DL (ref 68–131)
GLUCOSE SERPL-MCNC: 152 MG/DL (ref 70–110)
HBA1C MFR BLD: 6.6 % (ref 4–5.6)
HCT VFR BLD AUTO: 42.1 % (ref 37–48.5)
HDLC SERPL-MCNC: 61 MG/DL (ref 40–75)
HDLC SERPL: 32.8 % (ref 20–50)
HGB BLD-MCNC: 12.9 G/DL (ref 12–16)
IMM GRANULOCYTES # BLD AUTO: 0.02 K/UL (ref 0–0.04)
IMM GRANULOCYTES NFR BLD AUTO: 0.3 % (ref 0–0.5)
LDLC SERPL CALC-MCNC: 109.4 MG/DL (ref 63–159)
LYMPHOCYTES # BLD AUTO: 1.9 K/UL (ref 1–4.8)
LYMPHOCYTES NFR BLD: 30.8 % (ref 18–48)
MCH RBC QN AUTO: 28.7 PG (ref 27–31)
MCHC RBC AUTO-ENTMCNC: 30.6 G/DL (ref 32–36)
MCV RBC AUTO: 94 FL (ref 82–98)
MONOCYTES # BLD AUTO: 0.6 K/UL (ref 0.3–1)
MONOCYTES NFR BLD: 9.1 % (ref 4–15)
NEUTROPHILS # BLD AUTO: 3.6 K/UL (ref 1.8–7.7)
NEUTROPHILS NFR BLD: 58.7 % (ref 38–73)
NONHDLC SERPL-MCNC: 125 MG/DL
NRBC BLD-RTO: 0 /100 WBC
PLATELET # BLD AUTO: 216 K/UL (ref 150–450)
PMV BLD AUTO: 11.2 FL (ref 9.2–12.9)
POTASSIUM SERPL-SCNC: 4 MMOL/L (ref 3.5–5.1)
PROT SERPL-MCNC: 6.9 G/DL (ref 6–8.4)
RBC # BLD AUTO: 4.5 M/UL (ref 4–5.4)
SODIUM SERPL-SCNC: 139 MMOL/L (ref 136–145)
TRIGL SERPL-MCNC: 78 MG/DL (ref 30–150)
VIT B12 SERPL-MCNC: 278 PG/ML (ref 210–950)
WBC # BLD AUTO: 6.17 K/UL (ref 3.9–12.7)

## 2024-09-25 PROCEDURE — 85025 COMPLETE CBC W/AUTO DIFF WBC: CPT | Performed by: INTERNAL MEDICINE

## 2024-09-25 PROCEDURE — 82306 VITAMIN D 25 HYDROXY: CPT | Performed by: INTERNAL MEDICINE

## 2024-09-25 PROCEDURE — 83036 HEMOGLOBIN GLYCOSYLATED A1C: CPT | Performed by: INTERNAL MEDICINE

## 2024-09-25 PROCEDURE — 36415 COLL VENOUS BLD VENIPUNCTURE: CPT | Performed by: INTERNAL MEDICINE

## 2024-09-25 PROCEDURE — 82607 VITAMIN B-12: CPT | Performed by: INTERNAL MEDICINE

## 2024-09-25 PROCEDURE — 80053 COMPREHEN METABOLIC PANEL: CPT | Performed by: INTERNAL MEDICINE

## 2024-09-25 PROCEDURE — 80061 LIPID PANEL: CPT | Performed by: INTERNAL MEDICINE

## 2024-09-30 ENCOUNTER — OFFICE VISIT (OUTPATIENT)
Dept: INTERNAL MEDICINE | Facility: CLINIC | Age: 79
End: 2024-09-30
Payer: MEDICARE

## 2024-09-30 ENCOUNTER — IMMUNIZATION (OUTPATIENT)
Dept: INTERNAL MEDICINE | Facility: CLINIC | Age: 79
End: 2024-09-30
Payer: MEDICARE

## 2024-09-30 VITALS
DIASTOLIC BLOOD PRESSURE: 78 MMHG | OXYGEN SATURATION: 98 % | HEART RATE: 81 BPM | WEIGHT: 165.13 LBS | HEIGHT: 64 IN | SYSTOLIC BLOOD PRESSURE: 126 MMHG | BODY MASS INDEX: 28.19 KG/M2

## 2024-09-30 DIAGNOSIS — E78.49 OTHER HYPERLIPIDEMIA: ICD-10-CM

## 2024-09-30 DIAGNOSIS — E53.8 B12 DEFICIENCY: ICD-10-CM

## 2024-09-30 DIAGNOSIS — I10 ESSENTIAL HYPERTENSION: ICD-10-CM

## 2024-09-30 DIAGNOSIS — Z23 NEED FOR VACCINATION: Primary | ICD-10-CM

## 2024-09-30 DIAGNOSIS — R53.83 FATIGUE, UNSPECIFIED TYPE: ICD-10-CM

## 2024-09-30 DIAGNOSIS — Z85.3 HX: BREAST CANCER: ICD-10-CM

## 2024-09-30 DIAGNOSIS — E11.9 TYPE 2 DIABETES MELLITUS WITHOUT COMPLICATION, WITHOUT LONG-TERM CURRENT USE OF INSULIN: Primary | ICD-10-CM

## 2024-09-30 DIAGNOSIS — D64.9 ANEMIA, UNSPECIFIED TYPE: ICD-10-CM

## 2024-09-30 PROCEDURE — G0008 ADMIN INFLUENZA VIRUS VAC: HCPCS | Mod: PBBFAC

## 2024-09-30 PROCEDURE — 90653 IIV ADJUVANT VACCINE IM: CPT | Mod: PBBFAC

## 2024-09-30 PROCEDURE — 99214 OFFICE O/P EST MOD 30 MIN: CPT | Mod: PBBFAC,25 | Performed by: INTERNAL MEDICINE

## 2024-09-30 PROCEDURE — 99999 PR PBB SHADOW E&M-EST. PATIENT-LVL IV: CPT | Mod: PBBFAC,,, | Performed by: INTERNAL MEDICINE

## 2024-09-30 PROCEDURE — 99999PBSHW FLU VACCINE - ADJUVANTED: Mod: PBBFAC,,,

## 2024-09-30 PROCEDURE — 99214 OFFICE O/P EST MOD 30 MIN: CPT | Mod: S$PBB,,, | Performed by: INTERNAL MEDICINE

## 2024-09-30 RX ORDER — ESTRADIOL 1 MG/1
1 TABLET ORAL
COMMUNITY
End: 2024-09-30 | Stop reason: CLARIF

## 2024-09-30 RX ORDER — AMLODIPINE BESYLATE 5 MG/1
5 TABLET ORAL DAILY
Start: 2024-09-30

## 2024-09-30 NOTE — PROGRESS NOTES
"Subjective:       Patient ID: Loni Heard is a 79 y.o. female.    Chief Complaint: Follow-up  This is an 79-year-old who presents today for follow-up she reports that she has been feeling more fatigued lately she notes that she has been doing her B12 injections monthly when she had her B12 checked recently was on the lower side she reports she did not notice instead improvement when she gave herself B12 injection and wants to plan to go back to doing it every 2 weeks she is concerned about her [facility] she does not eat much red meat she may start a vitamin with iron she recently saw her breast surgeon and had a mammogram in August reports had an injury where a piece of plywood injured her on the right arm and breast during the last her cane she reports she had little hematomas which seemed to be resolving over time.  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  She has not been exercising as much as she was      Follow-up  Associated symptoms include fatigue.     Review of Systems   Constitutional:  Positive for fatigue.   Psychiatric/Behavioral:          School stressors       Objective:    Blood pressure 126/78, pulse 81, height 5' 4" (1.626 m), weight 74.9 kg (165 lb 2 oz), SpO2 98%.   Physical Exam  Constitutional:       General: She is not in acute distress.  HENT:      Head: Normocephalic.      Mouth/Throat:      Pharynx: Oropharynx is clear.   Cardiovascular:      Rate and Rhythm: Normal rate and regular rhythm.      Heart " "sounds: Normal heart sounds. No murmur heard.     No friction rub. No gallop.      Comments: Surgical changes left brast    Resolving bruise right breast and arm   Injury   Pulmonary:      Effort: Pulmonary effort is normal. No respiratory distress.      Breath sounds: Normal breath sounds.   Abdominal:      General: Bowel sounds are normal.      Palpations: Abdomen is soft. There is no mass.      Tenderness: There is no abdominal tenderness.   Skin:     Findings: No erythema.   Neurological:      Mental Status: She is alert.   Psychiatric:         Mood and Affect: Mood normal.         Assessment:       1. Type 2 diabetes mellitus without complication, without long-term current use of insulin    2. Essential hypertension    3. Other hyperlipidemia    4. HX: breast cancer    5. B12 deficiency    6. Fatigue, unspecified type    7. Anemia, unspecified type        Plan:       Loni Deluca" was seen today for follow-up.    Diagnoses and all orders for this visit:    Type 2 diabetes mellitus without complication, without long-term current use of insulin  History of A1c slight improvement continue Januvia  -     Comprehensive Metabolic Panel; Future  -     Hemoglobin A1C; Future  -     Lipid Panel; Future  -     CBC Auto Differential; Future  -     Vitamin B12; Future  -     FERRITIN; Future    Essential hypertension  Blood pressure acceptable today she has amlodipine to use when she has spikes but is not taking daily  -     Iron and TIBC; Future    Other hyperlipidemia  Slight trend up she is on rosuvastatin    HX: breast cancer  She is following with her outlying breast surgeon and up-to-date on her mammogram    B12 deficiency  Discussed will check iron with next visit she does not have a lot of iron in her diet she may consider multivitamin with iron  -     Vitamin B12; Future  -     Iron and TIBC; Future    Fatigue, unspecified type  Multifactorial she plans to resume B12 twice a month   -     Comprehensive " Metabolic Panel; Future  -     Hemoglobin A1C; Future  -     Lipid Panel; Future  -     CBC Auto Differential; Future  -     Vitamin B12; Future  -     Iron and TIBC; Future  -     FERRITIN; Future    Anemia, unspecified type  Will check with next blood work  -     Iron and TIBC; Future  -     FERRITIN; Future    Other orders  -     amLODIPine (NORVASC) 5 MG tablet; Take 1 tablet (5 mg total) by mouth once daily.  -     SITagliptin phosphate (JANUVIA) 100 MG Tab; TAKE 1 TABLET BY MOUTH EVERY DAY    Flu shot today discussed COVID booster and annual eye exam

## 2024-11-04 DIAGNOSIS — E53.8 B12 DEFICIENCY: ICD-10-CM

## 2024-11-04 RX ORDER — CYANOCOBALAMIN 1000 UG/ML
INJECTION, SOLUTION INTRAMUSCULAR; SUBCUTANEOUS
Qty: 9 ML | Refills: 11 | Status: SHIPPED | OUTPATIENT
Start: 2024-11-04

## 2025-01-09 ENCOUNTER — TELEPHONE (OUTPATIENT)
Dept: INTERNAL MEDICINE | Facility: CLINIC | Age: 80
End: 2025-01-09
Payer: MEDICARE

## 2025-01-09 RX ORDER — NIRMATRELVIR AND RITONAVIR 300-100 MG
KIT ORAL
Qty: 30 TABLET | Refills: 0 | Status: SHIPPED | OUTPATIENT
Start: 2025-01-09

## 2025-01-09 NOTE — TELEPHONE ENCOUNTER
Patient had positive home COVID test yesterday.   She reports that symptoms started 4-5 days ago. She initially had fever, resolved, and admits that now fever has returned. She has congestion, coughing, and no chest pain or SOB. Will start paxlovid 2nd to high covid rf. Has tolerated well in the past. -ANNETTA MCKEON    COVID RISK SCORE: 6

## 2025-01-09 NOTE — TELEPHONE ENCOUNTER
----- Message from Azalia sent at 1/9/2025 10:43 AM CST -----  Contact: [phone]@[email or web address]  Good morning patient would like to request the doc call her in some med for covid. Patient says she took a home test today 1/9/25 and it was positive for covid. Patient would like med sent to [facility]   Phone: [phone]      Please call patient to advise [phone]

## 2025-01-14 ENCOUNTER — OFFICE VISIT (OUTPATIENT)
Dept: INTERNAL MEDICINE | Facility: CLINIC | Age: 80
End: 2025-01-14
Payer: MEDICARE

## 2025-01-14 ENCOUNTER — TELEPHONE (OUTPATIENT)
Dept: INTERNAL MEDICINE | Facility: CLINIC | Age: 80
End: 2025-01-14
Payer: MEDICARE

## 2025-01-14 VITALS
DIASTOLIC BLOOD PRESSURE: 78 MMHG | WEIGHT: 166.25 LBS | BODY MASS INDEX: 28.53 KG/M2 | OXYGEN SATURATION: 95 % | SYSTOLIC BLOOD PRESSURE: 150 MMHG | HEART RATE: 80 BPM

## 2025-01-14 DIAGNOSIS — U07.1 COVID-19: Primary | ICD-10-CM

## 2025-01-14 DIAGNOSIS — I10 ESSENTIAL HYPERTENSION: ICD-10-CM

## 2025-01-14 PROCEDURE — 99214 OFFICE O/P EST MOD 30 MIN: CPT | Mod: PBBFAC

## 2025-01-14 PROCEDURE — 99212 OFFICE O/P EST SF 10 MIN: CPT | Mod: S$PBB,,,

## 2025-01-14 PROCEDURE — 99999 PR PBB SHADOW E&M-EST. PATIENT-LVL IV: CPT | Mod: PBBFAC,,,

## 2025-01-14 NOTE — TELEPHONE ENCOUNTER
----- Message from Vanessa sent at 1/14/2025  8:24 AM CST -----  Contact: [phone]  Pt is almost finished her script of Paxlovid and would like a call to discuss the status of her progress with Covid. Please call pt to discuss. Thanks

## 2025-01-14 NOTE — TELEPHONE ENCOUNTER
"Called pt. Pt is till experiencing nose drainage. Pt said this morning she had green phlegm and is coughing. Today is the last day of paxlovid. Pt said in the past she had a steroid shot and is wondering if she can get something like that to "knock out her remaining symptoms. Pt is concerned that she is contagious because of symptoms. Pt wants advice on potential treatment.   "
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
## 2025-01-22 NOTE — TELEPHONE ENCOUNTER
Care Due:                  Date            Visit Type   Department     Provider  --------------------------------------------------------------------------------                                EP [facility] INTERNAL  Mariaelena Lopez  Last Visit: 09-      CARE ([facility])   MEDICINE       Mohamud                              [facility] INTERNAL  Mariaelena Lopez  Next Visit: 01-      CARE ([facility])   MEDICINE       Mohamud                                                            Last  Test          Frequency    Reason                     Performed    Due Date  --------------------------------------------------------------------------------    HBA1C.......  6 months...  SITagliptin..............  09- 03-    [facility] Embedded Care Due Messages. Reference number: [id number].   1/22/2025 1:10:26 PM CST

## 2025-01-22 NOTE — TELEPHONE ENCOUNTER
Refill Decision Note   Loni Heard  is requesting a refill authorization.  Brief Assessment and Rationale for Refill:  Approve     Medication Therapy Plan:  FLOS      Comments:     Note composed:1:36 PM 01/22/2025

## 2025-01-27 ENCOUNTER — LAB VISIT (OUTPATIENT)
Dept: LAB | Facility: HOSPITAL | Age: 80
End: 2025-01-27
Attending: INTERNAL MEDICINE
Payer: MEDICARE

## 2025-01-27 DIAGNOSIS — E53.8 B12 DEFICIENCY: ICD-10-CM

## 2025-01-27 DIAGNOSIS — D64.9 ANEMIA, UNSPECIFIED TYPE: ICD-10-CM

## 2025-01-27 DIAGNOSIS — R53.83 FATIGUE, UNSPECIFIED TYPE: ICD-10-CM

## 2025-01-27 DIAGNOSIS — E11.9 TYPE 2 DIABETES MELLITUS WITHOUT COMPLICATION, WITHOUT LONG-TERM CURRENT USE OF INSULIN: ICD-10-CM

## 2025-01-27 DIAGNOSIS — I10 ESSENTIAL HYPERTENSION: ICD-10-CM

## 2025-01-27 LAB
ALBUMIN SERPL BCP-MCNC: 4.2 G/DL (ref 3.5–5.2)
ALP SERPL-CCNC: 66 U/L (ref 40–150)
ALT SERPL W/O P-5'-P-CCNC: 24 U/L (ref 10–44)
ANION GAP SERPL CALC-SCNC: 8 MMOL/L (ref 8–16)
AST SERPL-CCNC: 22 U/L (ref 10–40)
BASOPHILS # BLD AUTO: 0.02 K/UL (ref 0–0.2)
BASOPHILS NFR BLD: 0.3 % (ref 0–1.9)
BILIRUB SERPL-MCNC: 0.5 MG/DL (ref 0.1–1)
BUN SERPL-MCNC: 14 MG/DL (ref 8–23)
CALCIUM SERPL-MCNC: 9.6 MG/DL (ref 8.7–10.5)
CHLORIDE SERPL-SCNC: 107 MMOL/L (ref 95–110)
CHOLEST SERPL-MCNC: 159 MG/DL (ref 120–199)
CHOLEST/HDLC SERPL: 2.7 {RATIO} (ref 2–5)
CO2 SERPL-SCNC: 26 MMOL/L (ref 23–29)
CREAT SERPL-MCNC: 0.7 MG/DL (ref 0.5–1.4)
DIFFERENTIAL METHOD BLD: ABNORMAL
EOSINOPHIL # BLD AUTO: 0.1 K/UL (ref 0–0.5)
EOSINOPHIL NFR BLD: 0.8 % (ref 0–8)
ERYTHROCYTE [DISTWIDTH] IN BLOOD BY AUTOMATED COUNT: 12.5 % (ref 11.5–14.5)
EST. GFR  (NO RACE VARIABLE): >60 ML/MIN/1.73 M^2
ESTIMATED AVG GLUCOSE: 146 MG/DL (ref 68–131)
FERRITIN SERPL-MCNC: 35 NG/ML (ref 20–300)
GLUCOSE SERPL-MCNC: 133 MG/DL (ref 70–110)
HBA1C MFR BLD: 6.7 % (ref 4–5.6)
HCT VFR BLD AUTO: 40.8 % (ref 37–48.5)
HDLC SERPL-MCNC: 59 MG/DL (ref 40–75)
HDLC SERPL: 37.1 % (ref 20–50)
HGB BLD-MCNC: 12.9 G/DL (ref 12–16)
IMM GRANULOCYTES # BLD AUTO: 0.03 K/UL (ref 0–0.04)
IMM GRANULOCYTES NFR BLD AUTO: 0.4 % (ref 0–0.5)
IRON SERPL-MCNC: 62 UG/DL (ref 30–160)
LDLC SERPL CALC-MCNC: 80.4 MG/DL (ref 63–159)
LYMPHOCYTES # BLD AUTO: 1.8 K/UL (ref 1–4.8)
LYMPHOCYTES NFR BLD: 23 % (ref 18–48)
MCH RBC QN AUTO: 29.4 PG (ref 27–31)
MCHC RBC AUTO-ENTMCNC: 31.6 G/DL (ref 32–36)
MCV RBC AUTO: 93 FL (ref 82–98)
MONOCYTES # BLD AUTO: 0.6 K/UL (ref 0.3–1)
MONOCYTES NFR BLD: 7.7 % (ref 4–15)
NEUTROPHILS # BLD AUTO: 5.3 K/UL (ref 1.8–7.7)
NEUTROPHILS NFR BLD: 67.8 % (ref 38–73)
NONHDLC SERPL-MCNC: 100 MG/DL
NRBC BLD-RTO: 0 /100 WBC
PLATELET # BLD AUTO: 250 K/UL (ref 150–450)
PMV BLD AUTO: 11.1 FL (ref 9.2–12.9)
POTASSIUM SERPL-SCNC: 4.2 MMOL/L (ref 3.5–5.1)
PROT SERPL-MCNC: 7.5 G/DL (ref 6–8.4)
RBC # BLD AUTO: 4.39 M/UL (ref 4–5.4)
SATURATED IRON: 17 % (ref 20–50)
SODIUM SERPL-SCNC: 141 MMOL/L (ref 136–145)
TOTAL IRON BINDING CAPACITY: 364 UG/DL (ref 250–450)
TRANSFERRIN SERPL-MCNC: 246 MG/DL (ref 200–375)
TRIGL SERPL-MCNC: 98 MG/DL (ref 30–150)
VIT B12 SERPL-MCNC: 679 PG/ML (ref 210–950)
WBC # BLD AUTO: 7.79 K/UL (ref 3.9–12.7)

## 2025-01-27 PROCEDURE — 80061 LIPID PANEL: CPT | Performed by: INTERNAL MEDICINE

## 2025-01-27 PROCEDURE — 84466 ASSAY OF TRANSFERRIN: CPT | Performed by: INTERNAL MEDICINE

## 2025-01-27 PROCEDURE — 85025 COMPLETE CBC W/AUTO DIFF WBC: CPT | Performed by: INTERNAL MEDICINE

## 2025-01-27 PROCEDURE — 82728 ASSAY OF FERRITIN: CPT | Performed by: INTERNAL MEDICINE

## 2025-01-27 PROCEDURE — 36415 COLL VENOUS BLD VENIPUNCTURE: CPT | Performed by: INTERNAL MEDICINE

## 2025-01-27 PROCEDURE — 83036 HEMOGLOBIN GLYCOSYLATED A1C: CPT | Performed by: INTERNAL MEDICINE

## 2025-01-27 PROCEDURE — 82607 VITAMIN B-12: CPT | Performed by: INTERNAL MEDICINE

## 2025-01-27 PROCEDURE — 80053 COMPREHEN METABOLIC PANEL: CPT | Performed by: INTERNAL MEDICINE

## 2025-01-30 ENCOUNTER — OFFICE VISIT (OUTPATIENT)
Dept: INTERNAL MEDICINE | Facility: CLINIC | Age: 80
End: 2025-01-30
Payer: MEDICARE

## 2025-01-30 VITALS
BODY MASS INDEX: 28.49 KG/M2 | OXYGEN SATURATION: 96 % | HEART RATE: 78 BPM | HEIGHT: 64 IN | DIASTOLIC BLOOD PRESSURE: 78 MMHG | SYSTOLIC BLOOD PRESSURE: 138 MMHG | WEIGHT: 166.88 LBS

## 2025-01-30 DIAGNOSIS — I10 ESSENTIAL HYPERTENSION: ICD-10-CM

## 2025-01-30 DIAGNOSIS — R93.1 ELEVATED CORONARY ARTERY CALCIUM SCORE: ICD-10-CM

## 2025-01-30 DIAGNOSIS — Z85.3 HX: BREAST CANCER: ICD-10-CM

## 2025-01-30 DIAGNOSIS — E53.8 B12 DEFICIENCY: ICD-10-CM

## 2025-01-30 DIAGNOSIS — E11.9 TYPE 2 DIABETES MELLITUS WITHOUT COMPLICATION, WITHOUT LONG-TERM CURRENT USE OF INSULIN: Primary | ICD-10-CM

## 2025-01-30 DIAGNOSIS — E78.49 OTHER HYPERLIPIDEMIA: ICD-10-CM

## 2025-01-30 PROCEDURE — G2211 COMPLEX E/M VISIT ADD ON: HCPCS | Mod: S$PBB,,, | Performed by: INTERNAL MEDICINE

## 2025-01-30 PROCEDURE — 99214 OFFICE O/P EST MOD 30 MIN: CPT | Mod: S$PBB,,, | Performed by: INTERNAL MEDICINE

## 2025-01-30 PROCEDURE — 99215 OFFICE O/P EST HI 40 MIN: CPT | Mod: PBBFAC | Performed by: INTERNAL MEDICINE

## 2025-01-30 PROCEDURE — 99999 PR PBB SHADOW E&M-EST. PATIENT-LVL V: CPT | Mod: PBBFAC,,, | Performed by: INTERNAL MEDICINE
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
## 2025-01-31 ENCOUNTER — PATIENT MESSAGE (OUTPATIENT)
Dept: OPHTHALMOLOGY | Facility: CLINIC | Age: 80
End: 2025-01-31
Payer: MEDICARE

## 2025-02-02 PROBLEM — Z85.3 HISTORY OF LEFT BREAST CANCER: Status: ACTIVE | Noted: 2025-02-02

## 2025-02-03 ENCOUNTER — OFFICE VISIT (OUTPATIENT)
Dept: CARDIOLOGY | Facility: CLINIC | Age: 80
End: 2025-02-03
Payer: MEDICARE

## 2025-02-03 VITALS
HEART RATE: 73 BPM | WEIGHT: 166.69 LBS | DIASTOLIC BLOOD PRESSURE: 63 MMHG | BODY MASS INDEX: 28.46 KG/M2 | SYSTOLIC BLOOD PRESSURE: 125 MMHG | OXYGEN SATURATION: 98 % | HEIGHT: 64 IN

## 2025-02-03 DIAGNOSIS — R93.1 ELEVATED CORONARY ARTERY CALCIUM SCORE: Primary | ICD-10-CM

## 2025-02-03 DIAGNOSIS — Z85.3 HISTORY OF LEFT BREAST CANCER: ICD-10-CM

## 2025-02-03 DIAGNOSIS — E78.49 OTHER HYPERLIPIDEMIA: ICD-10-CM

## 2025-02-03 DIAGNOSIS — I10 ESSENTIAL HYPERTENSION: ICD-10-CM

## 2025-02-03 DIAGNOSIS — E78.5 HYPERLIPIDEMIA, UNSPECIFIED HYPERLIPIDEMIA TYPE: ICD-10-CM

## 2025-02-03 DIAGNOSIS — E11.9 TYPE 2 DIABETES MELLITUS WITHOUT COMPLICATION, WITHOUT LONG-TERM CURRENT USE OF INSULIN: ICD-10-CM

## 2025-02-03 DIAGNOSIS — R93.1 AGATSTON CORONARY ARTERY CALCIUM SCORE BETWEEN 200 AND 399: ICD-10-CM

## 2025-02-03 PROCEDURE — 99214 OFFICE O/P EST MOD 30 MIN: CPT | Mod: S$PBB,,, | Performed by: INTERNAL MEDICINE

## 2025-02-03 PROCEDURE — 99999 PR PBB SHADOW E&M-EST. PATIENT-LVL IV: CPT | Mod: PBBFAC,,, | Performed by: INTERNAL MEDICINE

## 2025-02-03 PROCEDURE — 99214 OFFICE O/P EST MOD 30 MIN: CPT | Mod: PBBFAC | Performed by: INTERNAL MEDICINE

## 2025-02-03 RX ORDER — ROSUVASTATIN CALCIUM 40 MG/1
40 TABLET, COATED ORAL NIGHTLY
Qty: 90 TABLET | Refills: 3 | Status: SHIPPED | OUTPATIENT
Start: 2025-02-03 | End: 2026-02-03

## 2025-02-03 RX ORDER — EZETIMIBE 10 MG/1
10 TABLET ORAL DAILY
Qty: 30 TABLET | Refills: 11 | Status: SHIPPED | OUTPATIENT
Start: 2025-02-03

## 2025-02-03 NOTE — PATIENT INSTRUCTIONS
You need to get your cholesterol checked after a 12 hour fast.   No food or drink other than water, and any medication that needs to be taken (except Januvia), for 12 hours prior to the blood test.  You can eat as soon as you want after the blood sample is taken.

## 2025-02-03 NOTE — Clinical Note
Thank you for allowing me to follow-up with Loni Heard for elevated coronary calcium score. Please see my note for details of this encounter. If you have any questions, please contact me.  Thank you again for allowing to participate in the care of this patient.

## 2025-02-03 NOTE — PROGRESS NOTES
Chart has been dictated using voice recognition software.  It is not been reviewed carefully for any transcriptional errors due to this technology.   Subjective:   Patient ID:  Loni Heard is a 79 y.o. female who presents for follow-up of No chief complaint on file.      HPI:  Patient, previously seen by Dr. Carrillo, with hyperlipidemia, hypertension, type 2 diabetes, and a high coronary calcium score.  The patient diagnosed with breast cancer and underwent a lumpectomy with radiation seed implantation in 2018. Carotid Doppler (29-May-2024) showed minimal atherosclerosis in her carotids    [place]. Patient denies any chest discomfort on exertion or at rest.  Patient denies any dyspnea at rest or on exertion, orthopnea, PND, or edema.  Rare palpitation.  No syncope. Gets lightheaded when she gets upset.        Past Medical History:   Diagnosis Date    Allergy     hx rhinitis     Asthma     childhood asthma     Atrophic gastritis     B12 deficiency     Cancer     left breast invasive ductal carcinoma     Cataract     Depression     Diabetes mellitus type II     Hyperlipidemia     Hypertension     Mild vitamin D deficiency     Pulmonary nodule     micronodule 8/2017        Outpatient Medications Prior to Visit   Medication Sig Dispense Refill    amLODIPine (NORVASC) 5 MG tablet Take 1 tablet (5 mg total) by mouth once daily. (Patient not taking: Reported on 1/14/2025)      blood sugar diagnostic Strp Use for blood sugar testing once daily -once touch ultra test strips 50 each 4    blood-glucose meter Misc Use for blood sugar testing as instructed once daily - product selection permitted 1 each 0    cholecalciferol, vitamin D3, (VITAMIN D3) 50 mcg (2,000 unit) Tab Take 1 tablet (2,000 Units total) by mouth once daily.      coenzyme Q10 10 mg capsule Take 10 mg by mouth.      cyanocobalamin 1,000 mcg/mL injection Inject 1000 mcg into the  "muscle twice monthly. 9 mL 11    estradioL 10 mcg Inst Place vaginally. One tablet vaginally twice weekly      lancets Misc Use for blood sugar testing once daily - one touch delica lanets 30 g 50 each 3    magnesium 250 mg Tab Take by mouth.      rosuvastatin (CRESTOR) 40 MG Tab Take 1 tablet (40 mg total) by mouth every evening. 90 tablet 3    SITagliptin phosphate (JANUVIA) 100 MG Tab TAKE 1 TABLET BY MOUTH EVERY DAY 90 tablet 0    syringe with needle, safety (BD INTEGRA SYRINGE) 3 mL 25 gauge x 1" Syrg for use with b 2 injection 100 Syringe 6    vit C/E/Zn/coppr/lutein/zeaxan (PRESERVISION AREDS-2 ORAL)        Facility-Administered Medications Prior to Visit   Medication Dose Route Frequency Provider Last Rate Last Admin    cyanocobalamin injection 1,000 mcg  1,000 mcg Intramuscular Q14 Days Mariaelena Mallory MD           Review of Systems   Constitutional: Negative for weight gain and weight loss.   HENT:  Negative for nosebleeds.    Respiratory:  Negative for hemoptysis.    Hematologic/Lymphatic: Negative for bleeding problem. Does not bruise/bleed easily.   Gastrointestinal:  Negative for hematemesis and hematochezia.   Genitourinary:  Negative for hematuria.   Neurological:  Negative for aphonia, focal weakness, numbness and weakness.      Objective:   Physical Exam  Constitutional:       Appearance: Normal appearance.      Comments: /63   Pulse 73   Ht 5' 4" (1.626 m)   Wt 75.6 kg (166 lb 10.7 oz)   SpO2 98%   BMI 28.61 kg/m²    Neck:      Thyroid: No thyromegaly.      Vascular: No carotid bruit or JVD.   Cardiovascular:      Rate and Rhythm: Normal rate and regular rhythm.      Pulses: Intact distal pulses.      Heart sounds: S1 normal and S2 normal. No murmur heard.     No friction rub. Gallop present. S4 sounds present.   Pulmonary:      Effort: Pulmonary effort is normal.      Breath sounds: Normal breath sounds. No wheezing or rales.   Chest:       Abdominal:      General: Bowel " sounds are normal. There is no abdominal bruit.      Palpations: Abdomen is soft.      Tenderness: There is no abdominal tenderness.   Musculoskeletal:      Cervical back: Neck supple.      Right lower leg: No edema.      Left lower leg: No edema.   Skin:     Nails: There is no clubbing.   Neurological:      Mental Status: She is alert and oriented to person, place, and time.         Lab Results   Component Value Date     01/27/2025    K 4.2 01/27/2025    BUN 14 01/27/2025    CREATININE 0.7 01/27/2025    EGFRNORACEVR >60.0 01/27/2025     (H) 01/27/2025    HGBA1C 6.7 (H) 01/27/2025    CHOL 159 01/27/2025    TRIG 98 01/27/2025    HDL 59 01/27/2025    LDLCALC 80.4 01/27/2025    HGB 12.9 01/27/2025    HCT 40.8 01/27/2025    WBC 7.79 01/27/2025     01/27/2025       Assessment:     1. Elevated coronary artery calcium score    2. Essential hypertension    3. Hyperlipidemia, unspecified hyperlipidemia type    4. Type 2 diabetes mellitus without complication, without long-term current use of insulin    5. History of left breast cancer      Patient has no symptoms of cardiac ischemia, heart failure, or significant arrhythmias.  Her hemoglobin A1c is acceptable as is her blood pressure.  Given the patient has an elevated coronary calcium score as well as atherosclerosis in her carotid arteries, her LDL goal should be less than 70 mg/dL.  Therefore, as the patient is on the maximum effective dose of rosuvastatin, ezetimibe 10 mg q.d. will be added to her medical regimen.  A repeat lipid profile will be drawn after 1 month of ezetimibe plus rosuvastatin therapy.  The patient is also encouraged to exercise and follow heart healthy diets.  She is given copies of the DASH, Mediterranean, and Heart Healthy diets.    Unless there are intervening problems, patient should return for re-evaluation in 1 year.     Plan:     Diagnoses and all orders for this visit:    Elevated coronary artery calcium score    Essential  hypertension    Hyperlipidemia, unspecified hyperlipidemia type    Type 2 diabetes mellitus without complication, without long-term current use of insulin    History of left breast cancer          William Kenny MD  Consultative Cardiology

## 2025-02-10 ENCOUNTER — PATIENT MESSAGE (OUTPATIENT)
Dept: CARDIOLOGY | Facility: CLINIC | Age: 80
End: 2025-02-10
Payer: MEDICARE

## 2025-02-21 DIAGNOSIS — Z00.00 ENCOUNTER FOR MEDICARE ANNUAL WELLNESS EXAM: ICD-10-CM

## 2025-03-03 ENCOUNTER — LAB VISIT (OUTPATIENT)
Dept: LAB | Facility: HOSPITAL | Age: 80
End: 2025-03-03
Attending: INTERNAL MEDICINE
Payer: MEDICARE

## 2025-03-03 DIAGNOSIS — E78.5 HYPERLIPIDEMIA, UNSPECIFIED HYPERLIPIDEMIA TYPE: ICD-10-CM

## 2025-03-03 LAB
CHOLEST SERPL-MCNC: 160 MG/DL (ref 120–199)
CHOLEST/HDLC SERPL: 3.1 {RATIO} (ref 2–5)
HDLC SERPL-MCNC: 52 MG/DL (ref 40–75)
HDLC SERPL: 32.5 % (ref 20–50)
LDLC SERPL CALC-MCNC: 89.6 MG/DL (ref 63–159)
NONHDLC SERPL-MCNC: 108 MG/DL
TRIGL SERPL-MCNC: 92 MG/DL (ref 30–150)

## 2025-03-03 PROCEDURE — 80061 LIPID PANEL: CPT | Performed by: INTERNAL MEDICINE

## 2025-03-03 PROCEDURE — 36415 COLL VENOUS BLD VENIPUNCTURE: CPT | Performed by: INTERNAL MEDICINE

## 2025-03-13 ENCOUNTER — PATIENT MESSAGE (OUTPATIENT)
Dept: INTERNAL MEDICINE | Facility: CLINIC | Age: 80
End: 2025-03-13
Payer: MEDICARE

## 2025-04-16 ENCOUNTER — OFFICE VISIT (OUTPATIENT)
Dept: OPTOMETRY | Facility: CLINIC | Age: 80
End: 2025-04-16
Payer: MEDICARE

## 2025-04-16 DIAGNOSIS — H25.13 NS (NUCLEAR SCLEROSIS), BILATERAL: ICD-10-CM

## 2025-04-16 DIAGNOSIS — E11.9 TYPE 2 DIABETES MELLITUS WITHOUT RETINOPATHY: Primary | ICD-10-CM

## 2025-04-16 DIAGNOSIS — H52.7 REFRACTIVE ERROR: ICD-10-CM

## 2025-04-16 DIAGNOSIS — E11.9 TYPE 2 DIABETES MELLITUS WITHOUT COMPLICATION, WITHOUT LONG-TERM CURRENT USE OF INSULIN: ICD-10-CM

## 2025-04-16 PROCEDURE — 92015 DETERMINE REFRACTIVE STATE: CPT | Mod: ,,, | Performed by: OPTOMETRIST

## 2025-04-16 PROCEDURE — 99214 OFFICE O/P EST MOD 30 MIN: CPT | Mod: S$PBB,,, | Performed by: OPTOMETRIST

## 2025-04-16 PROCEDURE — 99999 PR PBB SHADOW E&M-EST. PATIENT-LVL III: CPT | Mod: PBBFAC,,, | Performed by: OPTOMETRIST

## 2025-04-16 PROCEDURE — 99213 OFFICE O/P EST LOW 20 MIN: CPT | Mod: PBBFAC | Performed by: OPTOMETRIST

## 2025-04-16 NOTE — PROGRESS NOTES
HPI    NP, 78 yo F presents today for here today for a routine eye exam.    Pt. States VA has changed slightly. Some intermediate distances like   watching TV are not always as clear as they were. Otherwise VA is stable.   Pt. Bothered by glare due to night driving.    Denies F/F/pain  Eye Meds: Uses otc gtts for allergies       (+)Dryness  (-)Burning  (+)Itchiness  (-)Tearing  (-)Flashes  (-)Floaters   (-)Photophobia  (-)Eye Pain     Last edited by Madhu Zarate MA on 4/16/2025  9:08 AM.            Assessment /Plan     For exam results, see Encounter Report.    Type 2 diabetes mellitus without retinopathy    Type 2 diabetes mellitus without complication, without long-term current use of insulin  -     Ambulatory referral/consult to Ophthalmology    NS (nuclear sclerosis), bilateral    Refractive error      MONITOR. ED PT ON ALL EXAM FINDINGS  RX FINAL SPECS   OCULAR HEALTH STABLE OD, OS   TYPE 2 DM W/O RETINOPATHY OU; CONTINUE W/ PCP FOR GLYCEMIC CONTROL; MONIOT.R  MODERATE CATS OU; UV PROTECTION; MONITOR. CONSIDER RECHECK IN 4-6 MONTHS; CONSIDER SURGICAL INTERVENTION IF BECOMES SYMPTOMATIC; MONITOR.   RTC 1 YR//PRN FOR REE/DFE

## 2025-05-28 ENCOUNTER — LAB VISIT (OUTPATIENT)
Dept: LAB | Facility: HOSPITAL | Age: 80
End: 2025-05-28
Attending: INTERNAL MEDICINE
Payer: MEDICARE

## 2025-05-28 DIAGNOSIS — I10 ESSENTIAL HYPERTENSION: ICD-10-CM

## 2025-05-28 DIAGNOSIS — E53.8 B12 DEFICIENCY: ICD-10-CM

## 2025-05-28 DIAGNOSIS — E11.9 TYPE 2 DIABETES MELLITUS WITHOUT COMPLICATION, WITHOUT LONG-TERM CURRENT USE OF INSULIN: ICD-10-CM

## 2025-05-28 LAB
ABSOLUTE EOSINOPHIL (OHS): 0.05 K/UL
ABSOLUTE MONOCYTE (OHS): 0.46 K/UL (ref 0.3–1)
ABSOLUTE NEUTROPHIL COUNT (OHS): 3.81 K/UL (ref 1.8–7.7)
ALBUMIN SERPL BCP-MCNC: 4.1 G/DL (ref 3.5–5.2)
ALP SERPL-CCNC: 77 UNIT/L (ref 40–150)
ALT SERPL W/O P-5'-P-CCNC: 19 UNIT/L (ref 10–44)
ANION GAP (OHS): 7 MMOL/L (ref 8–16)
AST SERPL-CCNC: 20 UNIT/L (ref 11–45)
BASOPHILS # BLD AUTO: 0.02 K/UL
BASOPHILS NFR BLD AUTO: 0.3 %
BILIRUB SERPL-MCNC: 0.5 MG/DL (ref 0.1–1)
BUN SERPL-MCNC: 12 MG/DL (ref 8–23)
CALCIUM SERPL-MCNC: 9.1 MG/DL (ref 8.7–10.5)
CHLORIDE SERPL-SCNC: 106 MMOL/L (ref 95–110)
CHOLEST SERPL-MCNC: 162 MG/DL (ref 120–199)
CHOLEST/HDLC SERPL: 2.9 {RATIO} (ref 2–5)
CO2 SERPL-SCNC: 27 MMOL/L (ref 23–29)
CREAT SERPL-MCNC: 0.8 MG/DL (ref 0.5–1.4)
EAG (OHS): 151 MG/DL (ref 68–131)
ERYTHROCYTE [DISTWIDTH] IN BLOOD BY AUTOMATED COUNT: 12.1 % (ref 11.5–14.5)
FERRITIN SERPL-MCNC: 20 NG/ML (ref 20–300)
GFR SERPLBLD CREATININE-BSD FMLA CKD-EPI: >60 ML/MIN/1.73/M2
GLUCOSE SERPL-MCNC: 155 MG/DL (ref 70–110)
HBA1C MFR BLD: 6.9 % (ref 4–5.6)
HCT VFR BLD AUTO: 40.3 % (ref 37–48.5)
HDLC SERPL-MCNC: 56 MG/DL (ref 40–75)
HDLC SERPL: 34.6 % (ref 20–50)
HGB BLD-MCNC: 12.7 GM/DL (ref 12–16)
IMM GRANULOCYTES # BLD AUTO: 0.03 K/UL (ref 0–0.04)
IMM GRANULOCYTES NFR BLD AUTO: 0.5 % (ref 0–0.5)
IRON SATN MFR SERPL: 20 % (ref 20–50)
IRON SERPL-MCNC: 80 UG/DL (ref 30–160)
LDLC SERPL CALC-MCNC: 85.2 MG/DL (ref 63–159)
LYMPHOCYTES # BLD AUTO: 1.89 K/UL (ref 1–4.8)
MCH RBC QN AUTO: 28.9 PG (ref 27–31)
MCHC RBC AUTO-ENTMCNC: 31.5 G/DL (ref 32–36)
MCV RBC AUTO: 92 FL (ref 82–98)
NONHDLC SERPL-MCNC: 106 MG/DL
NUCLEATED RBC (/100WBC) (OHS): 0 /100 WBC
PLATELET # BLD AUTO: 225 K/UL (ref 150–450)
PMV BLD AUTO: 11.1 FL (ref 9.2–12.9)
POTASSIUM SERPL-SCNC: 4.2 MMOL/L (ref 3.5–5.1)
PROT SERPL-MCNC: 6.7 GM/DL (ref 6–8.4)
RBC # BLD AUTO: 4.4 M/UL (ref 4–5.4)
RELATIVE EOSINOPHIL (OHS): 0.8 %
RELATIVE LYMPHOCYTE (OHS): 30.2 % (ref 18–48)
RELATIVE MONOCYTE (OHS): 7.3 % (ref 4–15)
RELATIVE NEUTROPHIL (OHS): 60.9 % (ref 38–73)
SODIUM SERPL-SCNC: 140 MMOL/L (ref 136–145)
TIBC SERPL-MCNC: 397 UG/DL (ref 250–450)
TRANSFERRIN SERPL-MCNC: 268 MG/DL (ref 200–375)
TRIGL SERPL-MCNC: 104 MG/DL (ref 30–150)
TSH SERPL-ACNC: 2.13 UIU/ML (ref 0.4–4)
VIT B12 SERPL-MCNC: 602 PG/ML (ref 210–950)
WBC # BLD AUTO: 6.26 K/UL (ref 3.9–12.7)

## 2025-05-28 PROCEDURE — 82728 ASSAY OF FERRITIN: CPT

## 2025-05-28 PROCEDURE — 85025 COMPLETE CBC W/AUTO DIFF WBC: CPT

## 2025-05-28 PROCEDURE — 83036 HEMOGLOBIN GLYCOSYLATED A1C: CPT

## 2025-05-28 PROCEDURE — 80053 COMPREHEN METABOLIC PANEL: CPT

## 2025-05-28 PROCEDURE — 82607 VITAMIN B-12: CPT

## 2025-05-28 PROCEDURE — 84466 ASSAY OF TRANSFERRIN: CPT

## 2025-05-28 PROCEDURE — 36415 COLL VENOUS BLD VENIPUNCTURE: CPT

## 2025-05-28 PROCEDURE — 80061 LIPID PANEL: CPT

## 2025-05-28 PROCEDURE — 84443 ASSAY THYROID STIM HORMONE: CPT

## 2025-06-02 ENCOUNTER — OFFICE VISIT (OUTPATIENT)
Dept: INTERNAL MEDICINE | Facility: CLINIC | Age: 80
End: 2025-06-02
Payer: MEDICARE

## 2025-06-02 ENCOUNTER — PATIENT MESSAGE (OUTPATIENT)
Dept: ADMINISTRATIVE | Facility: HOSPITAL | Age: 80
End: 2025-06-02
Payer: MEDICARE

## 2025-06-02 VITALS
DIASTOLIC BLOOD PRESSURE: 78 MMHG | HEART RATE: 72 BPM | OXYGEN SATURATION: 97 % | SYSTOLIC BLOOD PRESSURE: 128 MMHG | HEIGHT: 64 IN | WEIGHT: 167.56 LBS | BODY MASS INDEX: 28.6 KG/M2

## 2025-06-02 DIAGNOSIS — Z85.3 HISTORY OF LEFT BREAST CANCER: ICD-10-CM

## 2025-06-02 DIAGNOSIS — R60.9 EDEMA, UNSPECIFIED TYPE: ICD-10-CM

## 2025-06-02 DIAGNOSIS — E53.8 B12 DEFICIENCY: ICD-10-CM

## 2025-06-02 DIAGNOSIS — E78.5 HYPERLIPIDEMIA, UNSPECIFIED HYPERLIPIDEMIA TYPE: ICD-10-CM

## 2025-06-02 DIAGNOSIS — E11.9 TYPE 2 DIABETES MELLITUS WITHOUT COMPLICATION, WITHOUT LONG-TERM CURRENT USE OF INSULIN: Primary | ICD-10-CM

## 2025-06-02 DIAGNOSIS — I87.2 VENOUS INSUFFICIENCY: ICD-10-CM

## 2025-06-02 PROCEDURE — 99214 OFFICE O/P EST MOD 30 MIN: CPT | Mod: S$PBB,,, | Performed by: INTERNAL MEDICINE

## 2025-06-02 PROCEDURE — 99214 OFFICE O/P EST MOD 30 MIN: CPT | Mod: PBBFAC | Performed by: INTERNAL MEDICINE

## 2025-06-02 PROCEDURE — G2211 COMPLEX E/M VISIT ADD ON: HCPCS | Mod: S$PBB,,, | Performed by: INTERNAL MEDICINE

## 2025-06-02 PROCEDURE — 99999 PR PBB SHADOW E&M-EST. PATIENT-LVL IV: CPT | Mod: PBBFAC,,, | Performed by: INTERNAL MEDICINE

## 2025-06-30 ENCOUNTER — TELEPHONE (OUTPATIENT)
Dept: INTERNAL MEDICINE | Facility: CLINIC | Age: 80
End: 2025-06-30
Payer: MEDICARE

## 2025-07-08 ENCOUNTER — TELEPHONE (OUTPATIENT)
Dept: INTERNAL MEDICINE | Facility: CLINIC | Age: 80
End: 2025-07-08
Payer: MEDICARE

## 2025-08-25 ENCOUNTER — PATIENT MESSAGE (OUTPATIENT)
Dept: INTERNAL MEDICINE | Facility: CLINIC | Age: 80
End: 2025-08-25
Payer: MEDICARE